# Patient Record
Sex: FEMALE | Race: ASIAN | NOT HISPANIC OR LATINO | Employment: FULL TIME | URBAN - METROPOLITAN AREA
[De-identification: names, ages, dates, MRNs, and addresses within clinical notes are randomized per-mention and may not be internally consistent; named-entity substitution may affect disease eponyms.]

---

## 2017-05-02 ENCOUNTER — AMBULATORY - HEALTHEAST (OUTPATIENT)
Dept: MULTI SPECIALTY CLINIC | Facility: CLINIC | Age: 32
End: 2017-05-02

## 2017-05-02 ENCOUNTER — OFFICE VISIT - HEALTHEAST (OUTPATIENT)
Dept: FAMILY MEDICINE | Facility: CLINIC | Age: 32
End: 2017-05-02

## 2017-05-02 DIAGNOSIS — N76.4 ABSCESS OF LABIA: ICD-10-CM

## 2017-05-02 LAB
HPV_EXT - HISTORICAL: NORMAL
PAP SMEAR - HIM PATIENT REPORTED: ABNORMAL

## 2017-10-05 ENCOUNTER — RECORDS - HEALTHEAST (OUTPATIENT)
Dept: ADMINISTRATIVE | Facility: OTHER | Age: 32
End: 2017-10-05

## 2018-04-13 ENCOUNTER — COMMUNICATION - HEALTHEAST (OUTPATIENT)
Dept: FAMILY MEDICINE | Facility: CLINIC | Age: 33
End: 2018-04-13

## 2018-12-10 ENCOUNTER — COMMUNICATION - HEALTHEAST (OUTPATIENT)
Dept: FAMILY MEDICINE | Facility: CLINIC | Age: 33
End: 2018-12-10

## 2019-04-22 ENCOUNTER — COMMUNICATION - HEALTHEAST (OUTPATIENT)
Dept: FAMILY MEDICINE | Facility: CLINIC | Age: 34
End: 2019-04-22

## 2020-02-07 ENCOUNTER — OFFICE VISIT - HEALTHEAST (OUTPATIENT)
Dept: FAMILY MEDICINE | Facility: CLINIC | Age: 35
End: 2020-02-07

## 2020-02-07 ENCOUNTER — COMMUNICATION - HEALTHEAST (OUTPATIENT)
Dept: FAMILY MEDICINE | Facility: CLINIC | Age: 35
End: 2020-02-07

## 2020-02-07 DIAGNOSIS — I10 ESSENTIAL HYPERTENSION: ICD-10-CM

## 2020-02-07 DIAGNOSIS — E66.811 OBESITY (BMI 30.0-34.9): ICD-10-CM

## 2020-02-07 DIAGNOSIS — E11.9 DIABETES (H): ICD-10-CM

## 2020-02-07 DIAGNOSIS — R80.9 MICROALBUMINURIA: ICD-10-CM

## 2020-02-07 DIAGNOSIS — F17.200 SMOKING: ICD-10-CM

## 2020-02-07 DIAGNOSIS — E11.65 POORLY CONTROLLED DIABETES MELLITUS (H): ICD-10-CM

## 2020-02-07 LAB
ALBUMIN SERPL-MCNC: 2.8 G/DL (ref 3.5–5)
ALP SERPL-CCNC: 110 U/L (ref 45–120)
ALT SERPL W P-5'-P-CCNC: 26 U/L (ref 0–45)
ANION GAP SERPL CALCULATED.3IONS-SCNC: 11 MMOL/L (ref 5–18)
AST SERPL W P-5'-P-CCNC: 11 U/L (ref 0–40)
BILIRUB SERPL-MCNC: 0.2 MG/DL (ref 0–1)
BUN SERPL-MCNC: 25 MG/DL (ref 8–22)
CALCIUM SERPL-MCNC: 8.8 MG/DL (ref 8.5–10.5)
CHLORIDE BLD-SCNC: 102 MMOL/L (ref 98–107)
CO2 SERPL-SCNC: 21 MMOL/L (ref 22–31)
CREAT SERPL-MCNC: 2.33 MG/DL (ref 0.6–1.1)
CREAT UR-MCNC: 64.4 MG/DL
GFR SERPL CREATININE-BSD FRML MDRD: 24 ML/MIN/1.73M2
GLUCOSE BLD-MCNC: 294 MG/DL (ref 70–125)
HBA1C MFR BLD: 11 % (ref 3.5–6)
MICROALBUMIN UR-MCNC: 483.09 MG/DL (ref 0–1.99)
MICROALBUMIN/CREAT UR: 7501.4 MG/G
POTASSIUM BLD-SCNC: 4.6 MMOL/L (ref 3.5–5)
PROT SERPL-MCNC: 7.1 G/DL (ref 6–8)
SODIUM SERPL-SCNC: 134 MMOL/L (ref 136–145)

## 2020-02-07 ASSESSMENT — MIFFLIN-ST. JEOR: SCORE: 1531.95

## 2020-02-10 ENCOUNTER — COMMUNICATION - HEALTHEAST (OUTPATIENT)
Dept: FAMILY MEDICINE | Facility: CLINIC | Age: 35
End: 2020-02-10

## 2020-02-10 DIAGNOSIS — E11.65 POORLY CONTROLLED DIABETES MELLITUS (H): ICD-10-CM

## 2020-02-13 ENCOUNTER — RECORDS - HEALTHEAST (OUTPATIENT)
Dept: ADMINISTRATIVE | Facility: OTHER | Age: 35
End: 2020-02-13

## 2020-02-13 LAB — RETINOPATHY: POSITIVE

## 2020-02-17 ENCOUNTER — RECORDS - HEALTHEAST (OUTPATIENT)
Dept: HEALTH INFORMATION MANAGEMENT | Facility: CLINIC | Age: 35
End: 2020-02-17

## 2020-02-28 ENCOUNTER — OFFICE VISIT (OUTPATIENT)
Dept: ENDOCRINOLOGY | Facility: CLINIC | Age: 35
End: 2020-02-28
Payer: COMMERCIAL

## 2020-02-28 ENCOUNTER — RECORDS - HEALTHEAST (OUTPATIENT)
Dept: ADMINISTRATIVE | Facility: OTHER | Age: 35
End: 2020-02-28

## 2020-02-28 VITALS
OXYGEN SATURATION: 100 % | DIASTOLIC BLOOD PRESSURE: 98 MMHG | BODY MASS INDEX: 36.73 KG/M2 | WEIGHT: 200.8 LBS | HEART RATE: 94 BPM | SYSTOLIC BLOOD PRESSURE: 134 MMHG

## 2020-02-28 DIAGNOSIS — R80.9 PROTEINURIA, UNSPECIFIED TYPE: ICD-10-CM

## 2020-02-28 DIAGNOSIS — E66.9 OBESITY WITH SERIOUS COMORBIDITY, UNSPECIFIED CLASSIFICATION, UNSPECIFIED OBESITY TYPE: ICD-10-CM

## 2020-02-28 DIAGNOSIS — E66.01 MORBID OBESITY (H): ICD-10-CM

## 2020-02-28 DIAGNOSIS — E11.319 TYPE 2 DIABETES MELLITUS WITH RETINOPATHY WITHOUT MACULAR EDEMA, WITHOUT LONG-TERM CURRENT USE OF INSULIN, UNSPECIFIED LATERALITY, UNSPECIFIED RETINOPATHY SEVERITY (H): Primary | ICD-10-CM

## 2020-02-28 DIAGNOSIS — N18.4 CKD (CHRONIC KIDNEY DISEASE) STAGE 4, GFR 15-29 ML/MIN (H): ICD-10-CM

## 2020-02-28 PROCEDURE — 99205 OFFICE O/P NEW HI 60 MIN: CPT | Performed by: INTERNAL MEDICINE

## 2020-02-28 NOTE — PROGRESS NOTES
"CC: DM.     HPI: Patient presents for management of type 2 DM.   Original diagnosis in 2012.     Did not see a doctor for the last 2 years and then presented to establish care earlier this month.   She did not have a meter or test strips until recently.   She was on metformin but this was stopped 2/2 low GFR.   She was started on glipizide 5 mg BID.     No meter in clinic.   Checks 1 hour post lunch and 1 hour post dinner.   AM 98, post lunch 168, post dinner 199. These are estimates.  She was not checking prior to 2 weeks ago.     No regular exercise or diet plan.   Recently has been trying to reduce carb intake.   Notes she needs to reschedule appointment with dietician.     She did she an eye doctor and was told she needed to f/u in May.   Does not recall exact findings. No laser to date.     Occasional numbness in toes. Does not disrupt sleep.     ROS: 10 point ROS neg other than the symptoms noted above in the HPI.    PMH:   Type 2 DM  CKD  Proteinuria  Retinopathy      Meds:  Current Outpatient Medications   Medication     aspirin 81 MG EC tablet     aspirin 81 MG tablet     Blood Pressure Monitor KIT     Glucose Blood (MARIA DEL CARMEN CONTOUR TEST) strip     metFORMIN (GLUCOPHAGE) 1000 MG tablet     metFORMIN (GLUCOPHAGE) 500 MG tablet     No current facility-administered medications for this visit.    Glipizide 5 mg BID     FHX:   Mother has DM.   No known renal disease.     SHX:  Smokes 2-3 cigarettes a day.     Exam:   Vital signs:      BP: (!) 142/98 Pulse: 94     SpO2: 100 %       Weight: 91.1 kg (200 lb 12.8 oz)  Estimated body mass index is 36.73 kg/m  as calculated from the following:    Height as of 11/8/13: 1.575 m (5' 2\").    Weight as of this encounter: 91.1 kg (200 lb 12.8 oz).  Gen: In NAD.   HEENT: no proptosis or lid lag, EOMI, no palpable thyroid tissue.  Card: S1 S2 RRR no m/r/g. no LE edema.   Pulm: CTA b/l.   GI: NT ND +BS.   MSK: no gross deformities.   Derm: no rashes or lesions. +acanthosis " nigricans.   Neuro: no tremor, +2 DTR's. Unable to sense monofilament until the level of the metatarsals b/l.     A/P:   Type 2 DM - Outside records reviewed. Uncontrolled. Now with proteinuria and low GFR. Based on self reported numbers, she would not need basal insulin yet. Discussed reports about GLP-1 and DDP-4 inhibitor with pancreatitis and pancreatic cancer. Although I cannot definitively say the patient will not get pancreatitis or pancreatic cancer, to date there is no conclusive evidence of a causal link with these agents for pancreatic cancer. There is a slight increase in risk of pancreatitis but overall risk still low. Persons with DM tend to have more pancreatitis and pancreatic cancer de tiffani. They are safe to use in CKD as long as the patient does not develop emesis and become dehydrated. Reviewed this with the patient.   -Follow up with primary care next week as planned.   -Bring meter to all clinic visits.   -Start ozempic 0.25 mg once a week, and then increase to 0.50 mg a week after 4 weeks.   -ASA taking.  -BP: elevated. Will defer to Renal. Likely will start ACEi or ARB.   -NAFL/DENNIS:normal ALT and AST in 2/2020.  -Lipids: due to be checked. Statin not indicated yet.   -Eyes: note from 2/13/2020 mentions retinopathy. F/U per Ophthalmology in 5/2020.   -Smoking: not ready to quit today.     Obesity - BMR 1660. Treatment options limited with CKD. Discussed GLP-1 agonists as above.   -Increase exercise as tolerated. Goal of 30 minutes of brisk walking 5 times a week.   -Reschedule with dietician.   -Ozempic as above.     Proteinuria - Serum Cr 2.33, GFR 24. Explained the gravity of the situation to her.   -Referral to Nephrology.     Alexandro Dawkins MD on 2/28/2020 at 10:26 AM      CC:  Toby Howard MD   [phone] (Work)  [phone] (Fax)  [address] [place] [address]

## 2020-02-28 NOTE — PATIENT INSTRUCTIONS
-Referral to Nephrology.   -Reschedule with dietician.     -Increase exercise as tolerated. Goal of 30 minutes of brisk walking 5 times a week.     -Bring meter to all clinic visits.     -Start ozempic 0.25 mg once a week, and then increase to 0.50 mg a week after 4 weeks.     -See me in 8 weeks with labs prior.

## 2020-02-28 NOTE — LETTER
"    2/28/2020         RE: Alysia Hernandez  [address]  [place] [address]        Dear Colleague,    Thank you for referring your patient, Alysia Hernandez, to the [facility]. Please see a copy of my visit note below.    CC: DM.     HPI: Patient presents for management of type 2 DM.   Original diagnosis in 2012.     Did not see a doctor for the last 2 years and then presented to establish care earlier this month.   She did not have a meter or test strips until recently.   She was on metformin but this was stopped 2/2 low GFR.   She was started on glipizide 5 mg BID.     No meter in clinic.   Checks 1 hour post lunch and 1 hour post dinner.   AM 98, post lunch 168, post dinner 199. These are estimates.  She was not checking prior to 2 weeks ago.     No regular exercise or diet plan.   Recently has been trying to reduce carb intake.   Notes she needs to reschedule appointment with dietician.     She did she an eye doctor and was told she needed to f/u in May.   Does not recall exact findings. No laser to date.     Occasional numbness in toes. Does not disrupt sleep.     ROS: 10 point ROS neg other than the symptoms noted above in the HPI.    PMH:   Type 2 DM  CKD  Proteinuria  Retinopathy      Meds:  Current Outpatient Medications   Medication     aspirin 81 MG EC tablet     aspirin 81 MG tablet     Blood Pressure Monitor KIT     Glucose Blood (MARIA DEL CARMEN CONTOUR TEST) strip     metFORMIN (GLUCOPHAGE) 1000 MG tablet     metFORMIN (GLUCOPHAGE) 500 MG tablet     No current facility-administered medications for this visit.    Glipizide 5 mg BID     FHX:   Mother has DM.   No known renal disease.     SHX:  Smokes 2-3 cigarettes a day.     Exam:   Vital signs:      BP: (!) 142/98 Pulse: 94     SpO2: 100 %       Weight: 91.1 kg (200 lb 12.8 oz)  Estimated body mass index is 36.73 kg/m  as calculated from the following:    Height as of 11/8/13: 1.575 m (5' 2\").    Weight as of this encounter: 91.1 kg (200 lb 12.8 " oz).  Gen: In NAD.   HEENT: no proptosis or lid lag, EOMI, no palpable thyroid tissue.  Card: S1 S2 RRR no m/r/g. no LE edema.   Pulm: CTA b/l.   GI: NT ND +BS.   MSK: no gross deformities.   Derm: no rashes or lesions. +acanthosis nigricans.   Neuro: no tremor, +2 DTR's. Unable to sense monofilament until the level of the metatarsals b/l.     A/P:   Type 2 DM - Outside records reviewed. Uncontrolled. Now with proteinuria and low GFR. Based on self reported numbers, she would not need basal insulin yet. Discussed reports about GLP-1 and DDP-4 inhibitor with pancreatitis and pancreatic cancer. Although I cannot definitively say the patient will not get pancreatitis or pancreatic cancer, to date there is no conclusive evidence of a causal link with these agents for pancreatic cancer. There is a slight increase in risk of pancreatitis but overall risk still low. Persons with DM tend to have more pancreatitis and pancreatic cancer de tiffani. They are safe to use in CKD as long as the patient does not develop emesis and become dehydrated. Reviewed this with the patient.   -Follow up with primary care next week as planned.   -Bring meter to all clinic visits.   -Start ozempic 0.25 mg once a week, and then increase to 0.50 mg a week after 4 weeks.   -ASA taking.  -BP: elevated. Will defer to Renal. Likely will start ACEi or ARB.   -NAFL/DENNIS:normal ALT and AST in 2/2020.  -Lipids: due to be checked. Statin not indicated yet.   -Eyes: note from 2/13/2020 mentions retinopathy. F/U per Ophthalmology in 5/2020.   -Smoking: not ready to quit today.     Obesity - BMR 1660. Treatment options limited with CKD. Discussed GLP-1 agonists as above.   -Increase exercise as tolerated. Goal of 30 minutes of brisk walking 5 times a week.   -Reschedule with dietician.   -Ozempic as above.     Proteinuria - Serum Cr 2.33, GFR 24. Explained the gravity of the situation to her.   -Referral to Nephrology.     Alexandro Dawkins MD on  2/28/2020 at 10:26 AM      CC:  Toby Howard MD   [phone] (Work)  [phone] (Fax)  [address] [place] [address]    Again, thank you for allowing me to participate in the care of your patient.        Sincerely,        Alexandro Dawkins MD

## 2020-02-28 NOTE — Clinical Note
Please CC:Toby Howard [phone] (Work)[phone] (Fax)[phone] o [address] [place] [id number]

## 2020-03-19 ENCOUNTER — COMMUNICATION - HEALTHEAST (OUTPATIENT)
Dept: FAMILY MEDICINE | Facility: CLINIC | Age: 35
End: 2020-03-19

## 2020-03-19 DIAGNOSIS — E11.9 DIABETES (H): ICD-10-CM

## 2020-03-27 ENCOUNTER — OFFICE VISIT - HEALTHEAST (OUTPATIENT)
Dept: FAMILY MEDICINE | Facility: CLINIC | Age: 35
End: 2020-03-27

## 2020-03-27 DIAGNOSIS — F17.200 SMOKING: ICD-10-CM

## 2020-03-27 DIAGNOSIS — E11.22 TYPE 2 DIABETES MELLITUS WITH CHRONIC KIDNEY DISEASE, WITHOUT LONG-TERM CURRENT USE OF INSULIN, UNSPECIFIED CKD STAGE (H): ICD-10-CM

## 2020-03-27 DIAGNOSIS — E11.9 DIABETES (H): ICD-10-CM

## 2020-03-27 DIAGNOSIS — I10 ESSENTIAL HYPERTENSION: ICD-10-CM

## 2020-04-06 ENCOUNTER — COMMUNICATION - HEALTHEAST (OUTPATIENT)
Dept: FAMILY MEDICINE | Facility: CLINIC | Age: 35
End: 2020-04-06

## 2020-04-06 DIAGNOSIS — I10 ESSENTIAL HYPERTENSION: ICD-10-CM

## 2020-04-10 ENCOUNTER — COMMUNICATION - HEALTHEAST (OUTPATIENT)
Dept: FAMILY MEDICINE | Facility: CLINIC | Age: 35
End: 2020-04-10

## 2020-04-24 ENCOUNTER — AMBULATORY - HEALTHEAST (OUTPATIENT)
Dept: EDUCATION SERVICES | Facility: CLINIC | Age: 35
End: 2020-04-24

## 2020-04-24 DIAGNOSIS — E11.9 TYPE 2 DIABETES MELLITUS WITHOUT COMPLICATION, WITHOUT LONG-TERM CURRENT USE OF INSULIN (H): ICD-10-CM

## 2020-05-07 ENCOUNTER — RECORDS - HEALTHEAST (OUTPATIENT)
Dept: ADMINISTRATIVE | Facility: OTHER | Age: 35
End: 2020-05-07

## 2020-05-11 ENCOUNTER — RECORDS - HEALTHEAST (OUTPATIENT)
Dept: ADMINISTRATIVE | Facility: OTHER | Age: 35
End: 2020-05-11

## 2020-05-11 LAB — RETINOPATHY: POSITIVE

## 2020-05-13 ENCOUNTER — COMMUNICATION - HEALTHEAST (OUTPATIENT)
Dept: FAMILY MEDICINE | Facility: CLINIC | Age: 35
End: 2020-05-13

## 2020-05-13 ENCOUNTER — RECORDS - HEALTHEAST (OUTPATIENT)
Dept: HEALTH INFORMATION MANAGEMENT | Facility: CLINIC | Age: 35
End: 2020-05-13

## 2020-05-13 DIAGNOSIS — E11.9 TYPE 2 DIABETES MELLITUS WITHOUT COMPLICATION, WITHOUT LONG-TERM CURRENT USE OF INSULIN (H): ICD-10-CM

## 2020-05-26 ENCOUNTER — COMMUNICATION - HEALTHEAST (OUTPATIENT)
Dept: FAMILY MEDICINE | Facility: CLINIC | Age: 35
End: 2020-05-26

## 2020-05-26 DIAGNOSIS — E11.9 DIABETES (H): ICD-10-CM

## 2020-05-29 ENCOUNTER — HOSPITAL ENCOUNTER (OUTPATIENT)
Dept: ULTRASOUND IMAGING | Facility: HOSPITAL | Age: 35
Discharge: HOME | End: 2020-05-29

## 2020-05-29 DIAGNOSIS — N18.4 CHRONIC KIDNEY DISEASE, STAGE 4 (SEVERE) (H): ICD-10-CM

## 2020-05-29 DIAGNOSIS — R80.9 NEPHROTIC RANGE PROTEINURIA: ICD-10-CM

## 2020-05-29 DIAGNOSIS — Z72.0 CURRENT TOBACCO USE: ICD-10-CM

## 2020-06-05 ENCOUNTER — RECORDS - HEALTHEAST (OUTPATIENT)
Dept: ADMINISTRATIVE | Facility: OTHER | Age: 35
End: 2020-06-05

## 2020-06-05 ENCOUNTER — COMMUNICATION - HEALTHEAST (OUTPATIENT)
Dept: FAMILY MEDICINE | Facility: CLINIC | Age: 35
End: 2020-06-05

## 2020-06-05 DIAGNOSIS — I10 ESSENTIAL HYPERTENSION: ICD-10-CM

## 2020-06-07 ENCOUNTER — COMMUNICATION - HEALTHEAST (OUTPATIENT)
Dept: FAMILY MEDICINE | Facility: CLINIC | Age: 35
End: 2020-06-07

## 2020-06-07 DIAGNOSIS — I10 ESSENTIAL HYPERTENSION: ICD-10-CM

## 2020-06-10 ENCOUNTER — COMMUNICATION - HEALTHEAST (OUTPATIENT)
Dept: FAMILY MEDICINE | Facility: CLINIC | Age: 35
End: 2020-06-10

## 2020-06-11 ENCOUNTER — RECORDS - HEALTHEAST (OUTPATIENT)
Dept: ADMINISTRATIVE | Facility: OTHER | Age: 35
End: 2020-06-11

## 2020-06-11 ENCOUNTER — OFFICE VISIT - HEALTHEAST (OUTPATIENT)
Dept: FAMILY MEDICINE | Facility: CLINIC | Age: 35
End: 2020-06-11

## 2020-06-11 DIAGNOSIS — I10 ELEVATED BLOOD PRESSURE READING WITH DIAGNOSIS OF HYPERTENSION: ICD-10-CM

## 2020-06-11 DIAGNOSIS — E11.22 TYPE 2 DIABETES MELLITUS WITH CHRONIC KIDNEY DISEASE, WITHOUT LONG-TERM CURRENT USE OF INSULIN, UNSPECIFIED CKD STAGE (H): ICD-10-CM

## 2020-06-11 DIAGNOSIS — H35.033 HYPERTENSIVE RETINOPATHY OF BOTH EYES: ICD-10-CM

## 2020-06-11 DIAGNOSIS — R80.9 NEPHROTIC RANGE PROTEINURIA: ICD-10-CM

## 2020-06-11 DIAGNOSIS — I10 ESSENTIAL HYPERTENSION: ICD-10-CM

## 2020-06-16 ENCOUNTER — COMMUNICATION - HEALTHEAST (OUTPATIENT)
Dept: FAMILY MEDICINE | Facility: CLINIC | Age: 35
End: 2020-06-16

## 2020-06-16 DIAGNOSIS — E11.9 DIABETES (H): ICD-10-CM

## 2020-07-01 ENCOUNTER — COMMUNICATION - HEALTHEAST (OUTPATIENT)
Dept: SCHEDULING | Facility: CLINIC | Age: 35
End: 2020-07-01

## 2020-07-01 DIAGNOSIS — E11.22 TYPE 2 DIABETES MELLITUS WITH CHRONIC KIDNEY DISEASE, WITHOUT LONG-TERM CURRENT USE OF INSULIN, UNSPECIFIED CKD STAGE (H): ICD-10-CM

## 2020-07-09 ENCOUNTER — COMMUNICATION - HEALTHEAST (OUTPATIENT)
Dept: FAMILY MEDICINE | Facility: CLINIC | Age: 35
End: 2020-07-09

## 2020-07-09 DIAGNOSIS — E11.9 DIABETES (H): ICD-10-CM

## 2020-07-22 ENCOUNTER — RECORDS - HEALTHEAST (OUTPATIENT)
Dept: ADMINISTRATIVE | Facility: OTHER | Age: 35
End: 2020-07-22

## 2020-07-22 LAB — RETINOPATHY: POSITIVE

## 2020-07-29 ENCOUNTER — RECORDS - HEALTHEAST (OUTPATIENT)
Dept: HEALTH INFORMATION MANAGEMENT | Facility: CLINIC | Age: 35
End: 2020-07-29

## 2020-12-15 ENCOUNTER — RECORDS - HEALTHEAST (OUTPATIENT)
Dept: ADMINISTRATIVE | Facility: OTHER | Age: 35
End: 2020-12-15

## 2020-12-15 LAB — RETINOPATHY: POSITIVE

## 2020-12-17 ENCOUNTER — COMMUNICATION - HEALTHEAST (OUTPATIENT)
Dept: FAMILY MEDICINE | Facility: CLINIC | Age: 35
End: 2020-12-17

## 2020-12-18 ENCOUNTER — RECORDS - HEALTHEAST (OUTPATIENT)
Dept: HEALTH INFORMATION MANAGEMENT | Facility: CLINIC | Age: 35
End: 2020-12-18

## 2020-12-21 ENCOUNTER — COMMUNICATION - HEALTHEAST (OUTPATIENT)
Dept: GENERAL RADIOLOGY | Facility: CLINIC | Age: 35
End: 2020-12-21

## 2021-01-08 ENCOUNTER — OFFICE VISIT - HEALTHEAST (OUTPATIENT)
Dept: FAMILY MEDICINE | Facility: CLINIC | Age: 36
End: 2021-01-08

## 2021-01-08 DIAGNOSIS — E66.811 OBESITY (BMI 30.0-34.9): ICD-10-CM

## 2021-01-08 DIAGNOSIS — N19 RENAL FAILURE, UNSPECIFIED CHRONICITY: ICD-10-CM

## 2021-01-08 DIAGNOSIS — F17.200 SMOKING: ICD-10-CM

## 2021-01-08 DIAGNOSIS — I10 ESSENTIAL HYPERTENSION: ICD-10-CM

## 2021-01-08 DIAGNOSIS — R80.9 NEPHROTIC RANGE PROTEINURIA: ICD-10-CM

## 2021-01-08 DIAGNOSIS — E11.22 TYPE 2 DIABETES MELLITUS WITH CHRONIC KIDNEY DISEASE, WITHOUT LONG-TERM CURRENT USE OF INSULIN, UNSPECIFIED CKD STAGE (H): ICD-10-CM

## 2021-01-08 DIAGNOSIS — R80.9 MICROALBUMINURIA: ICD-10-CM

## 2021-01-08 DIAGNOSIS — H35.033 HYPERTENSIVE RETINOPATHY OF BOTH EYES: ICD-10-CM

## 2021-01-08 ASSESSMENT — ANXIETY QUESTIONNAIRES
7. FEELING AFRAID AS IF SOMETHING AWFUL MIGHT HAPPEN: NOT AT ALL
1. FEELING NERVOUS, ANXIOUS, OR ON EDGE: NOT AT ALL
5. BEING SO RESTLESS THAT IT IS HARD TO SIT STILL: NOT AT ALL
6. BECOMING EASILY ANNOYED OR IRRITABLE: SEVERAL DAYS
3. WORRYING TOO MUCH ABOUT DIFFERENT THINGS: NOT AT ALL
4. TROUBLE RELAXING: NOT AT ALL
IF YOU CHECKED OFF ANY PROBLEMS ON THIS QUESTIONNAIRE, HOW DIFFICULT HAVE THESE PROBLEMS MADE IT FOR YOU TO DO YOUR WORK, TAKE CARE OF THINGS AT HOME, OR GET ALONG WITH OTHER PEOPLE: NOT DIFFICULT AT ALL
GAD7 TOTAL SCORE: 1
2. NOT BEING ABLE TO STOP OR CONTROL WORRYING: NOT AT ALL

## 2021-01-08 ASSESSMENT — PATIENT HEALTH QUESTIONNAIRE - PHQ9: SUM OF ALL RESPONSES TO PHQ QUESTIONS 1-9: 0

## 2021-01-13 ENCOUNTER — TRANSCRIBE ORDERS (OUTPATIENT)
Dept: OTHER | Age: 36
End: 2021-01-13

## 2021-01-13 DIAGNOSIS — N19 RENAL FAILURE: Primary | ICD-10-CM

## 2021-01-13 DIAGNOSIS — R80.9 NEPHROTIC RANGE PROTEINURIA: ICD-10-CM

## 2021-01-15 ENCOUNTER — COMMUNICATION - HEALTHEAST (OUTPATIENT)
Dept: FAMILY MEDICINE | Facility: CLINIC | Age: 36
End: 2021-01-15

## 2021-01-15 DIAGNOSIS — R80.9 NEPHROTIC RANGE PROTEINURIA: ICD-10-CM

## 2021-01-15 DIAGNOSIS — I10 ESSENTIAL HYPERTENSION: ICD-10-CM

## 2021-01-29 ENCOUNTER — AMBULATORY - HEALTHEAST (OUTPATIENT)
Dept: LAB | Facility: CLINIC | Age: 36
End: 2021-01-29

## 2021-01-29 ENCOUNTER — AMBULATORY - HEALTHEAST (OUTPATIENT)
Dept: NURSING | Facility: CLINIC | Age: 36
End: 2021-01-29

## 2021-01-29 DIAGNOSIS — I10 ESSENTIAL HYPERTENSION: ICD-10-CM

## 2021-01-29 DIAGNOSIS — R80.9 NEPHROTIC RANGE PROTEINURIA: ICD-10-CM

## 2021-01-29 DIAGNOSIS — H35.033 HYPERTENSIVE RETINOPATHY OF BOTH EYES: ICD-10-CM

## 2021-01-29 DIAGNOSIS — E11.22 TYPE 2 DIABETES MELLITUS WITH CHRONIC KIDNEY DISEASE, WITHOUT LONG-TERM CURRENT USE OF INSULIN, UNSPECIFIED CKD STAGE (H): ICD-10-CM

## 2021-01-29 LAB
ALBUMIN SERPL-MCNC: 3.2 G/DL (ref 3.5–5)
ALP SERPL-CCNC: 69 U/L (ref 45–120)
ALT SERPL W P-5'-P-CCNC: 15 U/L (ref 0–45)
ANION GAP SERPL CALCULATED.3IONS-SCNC: 9 MMOL/L (ref 5–18)
AST SERPL W P-5'-P-CCNC: 14 U/L (ref 0–40)
BILIRUB SERPL-MCNC: 0.1 MG/DL (ref 0–1)
BUN SERPL-MCNC: 44 MG/DL (ref 8–22)
CALCIUM SERPL-MCNC: 8.3 MG/DL (ref 8.5–10.5)
CHLORIDE BLD-SCNC: 110 MMOL/L (ref 98–107)
CHOLEST SERPL-MCNC: 223 MG/DL
CO2 SERPL-SCNC: 18 MMOL/L (ref 22–31)
CREAT SERPL-MCNC: 3.19 MG/DL (ref 0.6–1.1)
CREAT UR-MCNC: 56 MG/DL
FASTING STATUS PATIENT QL REPORTED: NO
GFR SERPL CREATININE-BSD FRML MDRD: 17 ML/MIN/1.73M2
GLUCOSE BLD-MCNC: 113 MG/DL (ref 70–125)
HBA1C MFR BLD: 6.3 %
HDLC SERPL-MCNC: 31 MG/DL
LDLC SERPL CALC-MCNC: 127 MG/DL
MICROALBUMIN UR-MCNC: 276 MG/DL (ref 0–1.99)
MICROALBUMIN/CREAT UR: 4928.6 MG/G
POTASSIUM BLD-SCNC: 5 MMOL/L (ref 3.5–5)
PROT SERPL-MCNC: 7.1 G/DL (ref 6–8)
SODIUM SERPL-SCNC: 137 MMOL/L (ref 136–145)
TRIGL SERPL-MCNC: 325 MG/DL

## 2021-02-04 ENCOUNTER — COMMUNICATION - HEALTHEAST (OUTPATIENT)
Dept: FAMILY MEDICINE | Facility: CLINIC | Age: 36
End: 2021-02-04

## 2021-03-22 ENCOUNTER — RECORDS - HEALTHEAST (OUTPATIENT)
Dept: ADMINISTRATIVE | Facility: OTHER | Age: 36
End: 2021-03-22

## 2021-03-22 LAB — RETINOPATHY: POSITIVE

## 2021-03-26 ENCOUNTER — RECORDS - HEALTHEAST (OUTPATIENT)
Dept: HEALTH INFORMATION MANAGEMENT | Facility: CLINIC | Age: 36
End: 2021-03-26

## 2021-03-26 ENCOUNTER — OFFICE VISIT - HEALTHEAST (OUTPATIENT)
Dept: FAMILY MEDICINE | Facility: CLINIC | Age: 36
End: 2021-03-26

## 2021-03-26 DIAGNOSIS — E11.9 DIABETES (H): ICD-10-CM

## 2021-03-26 DIAGNOSIS — I10 ESSENTIAL HYPERTENSION: ICD-10-CM

## 2021-03-26 DIAGNOSIS — F17.200 SMOKING: ICD-10-CM

## 2021-03-26 DIAGNOSIS — N18.4 CKD (CHRONIC KIDNEY DISEASE) STAGE 4, GFR 15-29 ML/MIN (H): ICD-10-CM

## 2021-03-26 DIAGNOSIS — E66.811 OBESITY (BMI 30.0-34.9): ICD-10-CM

## 2021-03-26 ASSESSMENT — MIFFLIN-ST. JEOR: SCORE: 1519.25

## 2021-05-27 VITALS — SYSTOLIC BLOOD PRESSURE: 163 MMHG | DIASTOLIC BLOOD PRESSURE: 85 MMHG | HEART RATE: 96 BPM

## 2021-05-27 ASSESSMENT — PATIENT HEALTH QUESTIONNAIRE - PHQ9: SUM OF ALL RESPONSES TO PHQ QUESTIONS 1-9: 0

## 2021-05-28 ASSESSMENT — ANXIETY QUESTIONNAIRES: GAD7 TOTAL SCORE: 1

## 2021-05-30 VITALS — WEIGHT: 192.9 LBS | BODY MASS INDEX: 34.72 KG/M2

## 2021-06-04 VITALS
WEIGHT: 193 LBS | RESPIRATION RATE: 20 BRPM | BODY MASS INDEX: 34.2 KG/M2 | OXYGEN SATURATION: 99 % | SYSTOLIC BLOOD PRESSURE: 181 MMHG | DIASTOLIC BLOOD PRESSURE: 116 MMHG | HEART RATE: 101 BPM | HEIGHT: 63 IN

## 2021-06-04 VITALS
WEIGHT: 186.25 LBS | HEART RATE: 95 BPM | DIASTOLIC BLOOD PRESSURE: 109 MMHG | SYSTOLIC BLOOD PRESSURE: 195 MMHG | BODY MASS INDEX: 33.5 KG/M2 | RESPIRATION RATE: 16 BRPM

## 2021-06-04 VITALS — WEIGHT: 193 LBS | BODY MASS INDEX: 34.72 KG/M2

## 2021-06-05 VITALS
BODY MASS INDEX: 33.7 KG/M2 | WEIGHT: 190.2 LBS | SYSTOLIC BLOOD PRESSURE: 143 MMHG | DIASTOLIC BLOOD PRESSURE: 84 MMHG | HEART RATE: 89 BPM | HEIGHT: 63 IN | RESPIRATION RATE: 16 BRPM

## 2021-06-05 NOTE — PROGRESS NOTES
Assessment:     1. Diabetes (H)  blood glucose test (CONTOUR TEST STRIPS) strips    generic lancets (FINGERSTIX LANCETS)    Microalbumin, Random Urine    Ambulatory referral to Ophthalmology    Glycosylated Hemoglobin A1c    Comprehensive Metabolic Panel    metFORMIN (GLUCOPHAGE) 500 MG tablet    glipiZIDE (GLUCOTROL XL) 5 MG 24 hr tablet    Glucose monitor    Ambulatory referral to Nephrology   2. Smoking     3. Essential hypertension  losartan (COZAAR) 50 MG tablet    Ambulatory referral to Nephrology   4. Obesity (BMI 30.0-34.9)  Ambulatory referral to Endocrinology   5. Creatinine elevation  Ambulatory referral to Nephrology    Ambulatory referral to Endocrinology   6. Microalbuminuria  Ambulatory referral to Nephrology    Ambulatory referral to Endocrinology   7. Poorly controlled diabetes mellitus (H)  Ambulatory referral to Diabetes Education Program (Existing Diagnosis)    Ambulatory referral to Endocrinology         Patient with poorly controlled diabetes last seen at [facility] and May/2017.  She has diabetes mellitus with smoking and alcohol use and very high blood pressure today presents for establishing cares again.      We will start with an urgent referral to nephrology and endocrinology for better management.  Will refer for diabetes education.  Also refer her to ophthalmology.    Detailed discussion regarding follow-up with patient done.  Discussed that she remains very high risk for complication of diabetes.  Reassured that with proper medication we can improve quality of life and complication in future.    Counseled to quit smoking.  About 3 to 8 minutes spent on counseling.    Greater than 40 minutes was spent today in interview and examination with more than 50% of that time in counseling and coordination of care.      During clinic visit, I had initiated her on metformin based on previous labs.  However noted at time of documentation that her creatinine is very high and likely some kidney  failure at this time and I have sent a message to stop metformin or not to initiate at this time.    Plan:      Discussed general issues about diabetes pathophysiology and management.  Counseling at today's visit: discussed the need for weight loss, discussed the advantages of a diet low in carbohydrates and reminded to check sugars regularly and to bring readings in at the time of the next visit.  Educational material distributed.  Reminded to get yearly retinal exam.  Diabetes educator referral.  Endocrinology clinic referral.     Subjective:      Chief Complaint   Patient presents with     Diabetes     Pt here today for DM check        Alysia Hernandez is a 34 y.o. female who presents for follow up of diabetes.. Current symptoms include: none.   Patient with diabetes was not followed in clinic for couple of years and is not on any medication.  She also has a diagnosis of hypertension and is not on any medication.    She informs me that she stopped taking medication as she did not like the way it made her feel.  Metformin caused her to have stomach bloating.    She denies any chest pain or shortness of breath.    She continues to smoke.    She also informs me that she drinks pretty heavily on the weekends.  She denies daily drinking habits.  On asking the amount, she informs me she can drink up to a whole bottle of hard liquor.    She got worried as her aunt recently had a stroke.  Her mom is also had a small stroke.  There is diabetes in the family.    She currently lives in her dad's house and continues to work.      The following portions of the patient's history were reviewed and updated as appropriate: allergies, current medications, past family history, past medical history, past social history, past surgical history and problem list.  No Known Allergies    Current Outpatient Medications on File Prior to Visit   Medication Sig Dispense Refill     NEEDLES, INSULIN DISPOSABLE (BD ULTRA-FINE KAREN PEN NEEDLES MISC)  Use 1 Units As Directed Daily at 8:00 am..       No current facility-administered medications on file prior to visit.        Patient Active Problem List   Diagnosis     Type 2 diabetes mellitus without complication (H)     Ganglion     HTN (hypertension)     Smoking     Obesity     Microalbuminuria     Renal failure, unspecified chronicity     Obesity (BMI 30.0-34.9)     Diabetes (H)       Past Medical History:   Diagnosis Date     Diabetes mellitus (H)        History reviewed. No pertinent surgical history.    Family History   Problem Relation Age of Onset     Diabetes Mother         50     Stroke Mother      No Medical Problems Father      No Medical Problems Sister      No Medical Problems Brother      Stroke Paternal Aunt        Social History     Socioeconomic History     Marital status: Single     Spouse name: None     Number of children: None     Years of education: None     Highest education level: None   Occupational History     None   Social Needs     Financial resource strain: None     Food insecurity:     Worry: None     Inability: None     Transportation needs:     Medical: None     Non-medical: None   Tobacco Use     Smoking status: Current Every Day Smoker     Packs/day: 0.25     Types: Cigarettes     Smokeless tobacco: Never Used   Substance and Sexual Activity     Alcohol use: No     Drug use: No     Sexual activity: None   Lifestyle     Physical activity:     Days per week: None     Minutes per session: None     Stress: None   Relationships     Social connections:     Talks on phone: None     Gets together: None     Attends Restorationism service: None     Active member of club or organization: None     Attends meetings of clubs or organizations: None     Relationship status: None     Intimate partner violence:     Fear of current or ex partner: None     Emotionally abused: None     Physically abused: None     Forced sexual activity: None   Other Topics Concern     None   Social History Narrative     "Lives alone at dad's home. 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  Partnered for 3 years. No children    Has has step kids ( 2) 18 and 8.        Toby Howard MD  2/7/2020           Review of Systems  A 12 point comprehensive review of systems was negative except as noted.      Objective:     BP (!) 181/116   Pulse (!) 101   Resp 20   Ht 5' 2.52\" (1.588 m)   Wt 193 lb (87.5 kg)   LMP 01/29/2020 (Approximate)   SpO2 99%   BMI 34.72 kg/m      GENERAL APPEARANCE:  Appearing stated age, smiling, alert, cooperative, and in no acute distress.   HEENT: Pupils equal, regular, react to light and accommodation. Extraocular muscles intact, fundi benign. Ear canals and tympanic membranes are normal. Lips, mouth, and throat are unremarkable.   NECK: Neck supple without adenopathy, thyromegaly or masses.   LYMPH: No anterior cervical or supraclavicular LN enlargement   PULMONARY: Normal respiratory effort. Chest is clear.   CARDIOVASCULAR: Heart auscultation: rhythm regular, heart sounds normal S1 and S2, bruit carotid artery absent.   SKIN: Warm and well perfused..   ABDOMEN: Abdomen soft, non-tender. BS normal. No masses or organomegaly.   EXTREMITIES: Peripheral pulses are full. Extremities with no edema.   MENTAL STATUS: Alert, oriented and thought content appropriate   NEUROLOGIC: Station and gait normal, strength and movement normal, reflexes are normal and symmetric       "

## 2021-06-05 NOTE — TELEPHONE ENCOUNTER
----- Message from Toby Howard MD sent at 2/9/2020 12:16 PM CST -----  Please call patient to make sure that she has reviewed  my chart message.  She should not start on metformin.    Toby Howard MD  2/9/2020

## 2021-06-06 NOTE — TELEPHONE ENCOUNTER
Drug Change Request  Who is calling?:  Pharmacy  What is the current medication?:  Contour plain is being phased out.  Need new order for Contour Next Meter only.   What alternative is being requested?: Countour Next Meter  Why the request to change?:  Pharmacy request  Requested Pharmacy?: [facility]  Okay to leave a detailed message?:  Yes [phone]

## 2021-06-07 NOTE — PROGRESS NOTES
Diabetes Educator has received verbal consent for a telephone visit for the patient./40 minutes    Assessment:   --initial telephone visit with Alysia regarding her Diabetes management.  --she had noticed blurry vision earlier this year, some increased thirst and urination  --patient stated that she was not taking her medication for Diabetes or making changes with her diet up until her visit with the MD in 3/20, when she had her A1C checked, after that visit she started taking her medication as prescribed, checking her glucose and changed her diet to the ketogenic diet.  --she is currently consuming three meals/snacks:  Am: schrader/eggs  Lunch: salad with HB eggs or chicken or schrader  Dinner: salad with protein  Snacks: berries or vegetables or nuts  --ETOH has been decreased to 1-2 glasses/wine every other day  --she has cut out sugary beverages, only drinking water or diet beverages  --she is smoking 3-5 cigarettes/day  --walking once per week  --eyes examined early in 2020, no recent dental exam    Blood glucose record 4/13-4/21:  Educator did not see meter, glucose readings are per patient  FBS: [id number]  Post meal: 97,79    Medications prescribed:  Glipizide 5 mg twice daily    Metformin 500 mg (2) twice daily(patient not taking per MD recommendation)  Ozempic .25 mg/week(patient not taking ( not taking per MD recommendation)    Education:  --reviewed BG readings, SMBG, goals for BG/A1C  --reviewed s/s of hypo/hyperglycemia & treatment,   --discussed the ketogenic diet: healthy eating for long term, hydration, benefit of hydration, preventative care: feet, teeth, eyes.     Plan:   1. Check glucose twice daily: fasting and two hours post meal.  2. If glucose below 70 mg/dl, treat with 4 oz juice or regular soda, recheck glucose in 15 minutes.  3. If patient has two readings of 70 or below in one week, call MD office for recommendations on medication.   4. Patient to exercise 2x/week for 30  minutes.   5. Follow up with educator on 20  6. Will schedule dental exam when clinics reopen.    Subjective and Objective:      Alysia Hernandez is referred by Toby Howard MD for Diabetes Education.     Lab Results   Component Value Date    HGBA1C 11.0 (H) 2020                         Education:     Monitoring   Meter (per above goals): educator did not see meter, glucose readings were given by patient  Monitoring: Assessed, Discussed and Literature provided  BG goals: Assessed, Discussed and Literature provided    Nutrition Management  Nutrition Management: Assessed, Discussed and Literature provided  Weight: Assessed and Discussed  Portions/Balance: Assessed, Discussed and Literature provided  Physical Activity: Assessed, Discussed and Literature provided    Orals: Assessed and Discussed      Diabetes Disease Process: Assessed, Discussed and Literature provided    Acute Complications: Prevent, Detect, Treat:  Hypoglycemia: Assessed, Discussed and Literature provided  Hyperglycemia: Assessed, Discussed and Literature provided  Sick Days: Literature provided  Driving: Not addressed    Chronic Complications  Foot Care:Assessed, Discussed and Literature provided  Skin Care: Assessed, Discussed and Literature provided  Eye: Assessed, Discussed and Literature provided  ABC: Assessed  Teeth:Assessed and Discussed  Goal Setting and Problem Solving: Assessed and Discussed  Barriers: Assessed and Discussed  Psychosocial Adjustments: Assessed and Discussed      Time spent with the patient: 40 minutes for diabetes education and counseling.   Previous Education: patient states she had education when diagnoses with Diabetes back in   Visit Type:DSMT  Hours Remainin, DSMT 2 and MNT 1.25      Prudence Harrell  2020

## 2021-06-07 NOTE — PROGRESS NOTES
"Alysia Hernandez is a 34 y.o. female who is being evaluated via a billable telephone visit.      The patient has been notified of following:     \"This telephone visit will be conducted via a call between you and your physician/provider. We have found that certain health care needs can be provided without the need for a physical exam.  This service lets us provide the care you need with a short phone conversation.  If a prescription is necessary we can send it directly to your pharmacy.  If lab work is needed we can place an order for that and you can then stop by our lab to have the test done at a later time.    If during the course of the call the physician/provider feels a telephone visit is not appropriate, you will not be charged for this service.\"         Alysia Hernandez complains of    Chief Complaint   Patient presents with     Diabetes     Pt has been monitoring BS at home and states lowest it was is 58, the highest was 146       I have reviewed and updated the patient's Past Medical History, Social History, Family History and Medication List.    ALLERGIES  Patient has no known allergies.    Additional provider notes: Follow-up with patient regarding her diabetes that is under poor control and blood pressure that was noted to be fairly high at last visit.  Since I have last seen her, patient has been evaluated by endocrinology.  She was started on Ozempic for her diabetes that she has not injected.  She has been checking her blood sugars regularly.  She informs me that her fasting blood sugars are 58, 98 and the highest was 107.  She informs me that postprandial highest numbers have been 146.  This is with glipizide 5 mg.  She does endorse increased urination.  She informs me that she gets up about 1 time at night to urinate.  She does not have a blood pressure monitor and has not been keeping a check of her blood pressure.  Blood pressure at endocrinologist visit was 142/98 on 2/28/2020.  She indicates that she " is feeling well and denies any symptoms referable to her elevated blood pressure. Specifically denies chest pain, palpitations, dyspnea, orthopnea,   PND or peripheral edema    She does continue smoking and feels that she has increased her smoking since stay at home orders for COVID outbreak has happened.    Assessment/Plan:  1. Type 2 diabetes mellitus with chronic kidney disease, without long-term current use of insulin, unspecified CKD stage (H)     2. Essential hypertension  blood pressure monitor Kit   3. Diabetes (H)  glipiZIDE (GLUCOTROL XL) 5 MG 24 hr tablet   4. Smoking       Her blood sugars appear to been under excellent control.  She can hold off on Ozempic for now noting these numbers  Strongly recommend that she quit smoking.  She does inform me that she has not had a drink since she has last seen me.  This is very encouraging.  I have sent a blood pressure monitor to the pharmacy and I would like her to check her blood pressure and reach to me via my chart so we can titrate up her losartan versus adding amlodipine to her current regimen.  Patient is agreeable.      Phone call duration:  14 minutes    Briseida Tinsley, CMA

## 2021-06-07 NOTE — PATIENT INSTRUCTIONS - HE
1. Check glucose twice daily: fasting and two hours after one of your meals.  2. Exercise twice weekly for 30 minutes  3. If you have two readings of 70 or below in the same week, please call into your physicians office.   4. Patient states she will continue to follow the ketogenic diet.

## 2021-06-08 NOTE — PROGRESS NOTES
Assessment:     1. Essential hypertension  amLODIPine (NORVASC) 10 MG tablet   2. Nephrotic range proteinuria  amLODIPine (NORVASC) 10 MG tablet   3. Type 2 diabetes mellitus with chronic kidney disease, without long-term current use of insulin, unspecified CKD stage (H)     4. Hypertensive retinopathy of both eyes     5. Elevated blood pressure reading with diagnosis of hypertension           Alysia Hernandez  has a past medical history  has a past medical history of Diabetes (H) (2/9/2020), Diabetes mellitus (H), HTN (hypertension) (9/14/2015), Hypertensive retinopathy of both eyes (4/19/2013), Nephrotic range proteinuria (6/11/2020), Obesity (BMI 30.0-34.9) (2/9/2020), Renal failure, unspecified chronicity (2/9/2020), and Smoking (5/4/2016).    She presents today with persistent extremely elevated blood pressure.  Chart reviewed along with nephrology specialist and ophthalmologist and notes and labs.    I paged the on-call nephrologist for associated nephrology and discussed her case.  I reviewed the nurse practitioner's note where it mentions that patient can be considered for metoprolol in future.  I do not believe metoprolol would be a good option for controlling extremely high blood pressure for this patient.  The on-call nephrologist agrees with the plan.  We discussed possible use of amlodipine, thiazide diuretics versus carvedilol.  There is definitely possibility of edema with amlodipine.  The specialist agrees that all of the above may be a good plan of care and we will start with amlodipine.      Greater than 40 minutes was spent today in interview and examination with more than 50% of that time in counseling and coordination of care of uncontrolled hypertension in a patient with diabetes, chronic disease and hypertensive retinopathy       Plan:      The diagnosis was discussed with the patient and evaluation and treatment plans outlined.  Follow up: Return in about 4 weeks (around 7/9/2020) for  recheck.     Patient Instructions   I have seen you today for follow-up of blood pressure.  Noting that you have had persistent high blood pressure, I have discussed your case with on-call doctor with associated nephrology.  At this time, I will start you on amlodipine 10 mg daily.    This medication can cause swelling of legs or feet.  If that is the case, you will let me know via MyChart or by calling my office and leaving a message.  We will then change the medication.      Per plan of care discussed with nephrologist, we still have room to try thiazide diuretics.  If, that does not work then we will try a beta-blocker called carvedilol which has better blood pressure control.    I will see her back in 1 month.    Keep checking of your  blood pressure at home.    Toby Howard MD  6/11/2020            Subjective:      Alysia Hernandez is a  female who presents for evaluation of   Chief Complaint   Patient presents with     Blood Pressure Check     Medication Management     Patient is here for follow-up of diabetes, blood pressure.  We started her back on diabetes medication in February.  Since then she has done amazingly well.  However, it was noted that she is in chronic renal failure and a referral to nephrology has been done.  She has been following with a nurse practitioner.   her blood pressure has remained persistently elevated as to the number of 200/100 mmHg.  This is documented at her most recent kidney specialist visit.  Similarly it has been observed at the eye doctor visit.  She is also known to have hypertensive eye disease.  She does have nephrotic range proteinuria.  She is pretty much stayed home during the current COVID-19 pandemic.  She denies any fever, shortness of breath.  Her appetite remains fair.  She denies any decline in her energy levels.      The following portions of the patient's history were reviewed and updated as appropriate: allergies, current medications, past family history,  past medical history, past social history, past surgical history and problem list.    No Known Allergies    Current Outpatient Medications on File Prior to Visit   Medication Sig Dispense Refill     blood glucose test (CONTOUR TEST STRIPS) strips Use 1 each As Directed 4 (four) times a day. 400 strip 3     blood-glucose meter (CONTOUR NEXT EZ METER) Misc Use 4 x day 1 each 0     blood-glucose meter (ONETOUCH VERIO SYSTEM) Misc Use 1 kit As Directed 4 (four) times a day. 1 each 0     generic lancets (FINGERSTIX LANCETS) Use up to 4 lancets daily as directed. 100 each 3     glipiZIDE (GLUCOTROL XL) 5 MG 24 hr tablet Take two tablets daily 30 tablet 3     losartan (COZAAR) 100 MG tablet TAKE 1 TABLET(100 MG) BY MOUTH DAILY 90 tablet 1     blood glucose test strips Use 1 each As Directed as needed. Dispense brand per patient's insurance at pharmacy discretion. 200 strip 3     blood pressure monitor Kit Use 1 kit As Directed daily. 1 each 0     metFORMIN (GLUCOPHAGE) 500 MG tablet Take 2 tablets (1,000 mg total) by mouth 2 (two) times a day with meals. 120 tablet 3     NEEDLES, INSULIN DISPOSABLE (BD ULTRA-FINE KAREN PEN NEEDLES MISC) Use 1 Units As Directed Daily at 8:00 am..       OZEMPIC 0.25 mg or 0.5 mg(2 mg/1.5 mL) PnIj INJECT 0.25MG SUBCUTANEOUSLY ONCE WEEKLY FOR 4 WEEKS THEN INCREASE TO 0.5MG WEEKLY.       No current facility-administered medications on file prior to visit.        Patient Active Problem List   Diagnosis     Ganglion     HTN (hypertension)     Smoking     Obesity     Microalbuminuria     Renal failure, unspecified chronicity     Obesity (BMI 30.0-34.9)     Diabetes (H)     Hypertensive retinopathy of both eyes     Nephrotic range proteinuria       Past Medical History:   Diagnosis Date     Diabetes (H) 2/9/2020     Diabetes mellitus (H)      HTN (hypertension) 9/14/2015     Hypertensive retinopathy of both eyes 4/19/2013     Nephrotic range proteinuria 6/11/2020     Obesity (BMI 30.0-34.9) 2/9/2020      Renal failure, unspecified chronicity 2/9/2020     Smoking 5/4/2016       No past surgical history on file.    Family History   Problem Relation Age of Onset     Diabetes Mother         50     Stroke Mother      No Medical Problems Father      No Medical Problems Sister      No Medical Problems Brother      Stroke Paternal Aunt        Social History     Socioeconomic History     Marital status: Single     Spouse name: None     Number of children: None     Years of education: None     Highest education level: None   Occupational History     None   Social Needs     Financial resource strain: None     Food insecurity     Worry: None     Inability: None     Transportation needs     Medical: None     Non-medical: None   Tobacco Use     Smoking status: Current Every Day Smoker     Packs/day: 0.25     Types: Cigarettes     Smokeless tobacco: Never Used   Substance and Sexual Activity     Alcohol use: No     Drug use: No     Sexual activity: None   Lifestyle     Physical activity     Days per week: None     Minutes per session: None     Stress: None   Relationships     Social connections     Talks on phone: None     Gets together: None     Attends Congregational service: None     Active member of club or organization: None     Attends meetings of clubs or organizations: None     Relationship status: None     Intimate partner violence     Fear of current or ex partner: None     Emotionally abused: None     Physically abused: None     Forced sexual activity: None   Other Topics Concern     None   Social History Narrative    Lives alone at dad's home. 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  Partnered for 3 years. No children    Has has step kids ( 2) 18 and 8.        Toby Howard MD  2/7/2020           Review of Systems  A 12 point comprehensive review of systems was negative except as noted.       Objective:   BP (!) 195/109 (Patient Site: Left Arm, Patient Position: Sitting, Cuff Size: Adult Large)   Pulse  95   Resp 16   Wt 186 lb 4 oz (84.5 kg)   LMP 06/02/2020 (Approximate)   BMI 33.50 kg/m      GENERAL: Healthy, alert and no distress  EYES: Eyes grossly normal to inspection. No discharge or erythema, or obvious scleral/conjunctival abnormalities.  RESP: No audible wheeze, cough, or visible cyanosis.  No visible retractions or increased work of breathing.    NEURO: Cranial nerves grossly intact. Mentation and speech appropriate for age.  PSYCH: Mentation appears normal, affect normal/bright, judgement and insight intact, normal speech and appearance well-groomed

## 2021-06-08 NOTE — TELEPHONE ENCOUNTER
Refill Approved    Rx renewed per Medication Renewal Policy. Medication was last renewed on 6/5/2020 with 1 refill. Change in pharmacy noted.   Last office visit: 3/27/2020 Virtual visit with PCP Dr MARYSOL Howard.    Jil Buenrostro, [facility] Triage/Med Refill 6/9/2020     Requested Prescriptions   Pending Prescriptions Disp Refills     losartan (COZAAR) 100 MG tablet [Pharmacy Med Name: LOSARTAN 100MG TABLETS] 30 tablet 0     Sig: TAKE 1 TABLET(100 MG) BY MOUTH DAILY       Angiotensin Receptor Blocker Protocol Passed - 6/7/2020 12:02 PM        Passed - PCP or prescribing provider visit in past 12 months       Last office visit with prescriber/PCP: Visit date not found OR same dept: 2/7/2020 Toby Howard MD OR same specialty: 2/7/2020 Toby Howard MD  Last physical: Visit date not found Last MTM visit: Visit date not found   Next visit within 3 mo: Visit date not found  Next physical within 3 mo: Visit date not found  Prescriber OR PCP: Samson Roe MD  Last diagnosis associated with med order: 1. Essential hypertension  - losartan (COZAAR) 100 MG tablet [Pharmacy Med Name: LOSARTAN 100MG TABLETS]; TAKE 1 TABLET(100 MG) BY MOUTH DAILY  Dispense: 30 tablet; Refill: 0    If protocol passes may refill for 12 months if within 3 months of last provider visit (or a total of 15 months).             Passed - Serum potassium within the past 12 months     Lab Results   Component Value Date    Potassium 4.6 02/07/2020             Passed - Blood pressure filed in past 12 months     BP Readings from Last 1 Encounters:   02/07/20 (!) 181/116             Passed - Serum creatinine within the past 12 months     Creatinine   Date Value Ref Range Status   02/07/2020 2.33 (H) 0.60 - 1.10 mg/dL Final

## 2021-06-08 NOTE — TELEPHONE ENCOUNTER
Please inform patient that I have received the report of her eye exam.  It is noted that she has eye disease related to hypertension.  Better control of blood pressure is needed.  I would advise that she schedule a clinic visit with me either tomorrow or Friday.  If that is not feasible then we can do a video visit next week.    We need to titrate up her blood pressure medication to goal blood pressure of 130/80 mmHg or less    Toby Howard MD  6/10/2020

## 2021-06-08 NOTE — TELEPHONE ENCOUNTER
Refill Approved    Rx renewed per Medication Renewal Policy. Medication was last renewed on 2/7/20.    Shadia Bui, [facility] Triage/Med Refill 5/29/2020     Requested Prescriptions   Pending Prescriptions Disp Refills     blood glucose test (CONTOUR TEST STRIPS) strips 100 strip 1     Sig: Use 1 each As Directed 4 (four) times a day.       Diabetic Supplies Refill Protocol Passed - 5/26/2020  4:48 PM        Passed - Visit with PCP or prescribing provider visit in last 6 months     Last office visit with prescriber/PCP: 2/7/2020 Toby Howard MD OR same dept: 2/7/2020 Toby Howard MD OR same specialty: 2/7/2020 Toby Howard MD  Last physical: Visit date not found Last MTM visit: Visit date not found   Next visit within 3 mo: Visit date not found  Next physical within 3 mo: Visit date not found  Prescriber OR PCP: Toby Howard MD  Last diagnosis associated with med order: 1. Diabetes (H)  - blood glucose test (CONTOUR TEST STRIPS) strips; Use 1 each As Directed 4 (four) times a day.  Dispense: 100 strip; Refill: 1    If protocol passes may refill for 12 months if within 3 months of last provider visit (or a total of 15 months).             Passed - A1C in last 6 months     Hemoglobin A1c   Date Value Ref Range Status   02/07/2020 11.0 (H) 3.5 - 6.0 % Final

## 2021-06-08 NOTE — TELEPHONE ENCOUNTER
Refill Approved    Rx renewed per Medication Renewal Policy. Medication was last renewed on 2/7/20.    Shadia Bui, [facility] Triage/Med Refill 5/14/2020     Requested Prescriptions   Pending Prescriptions Disp Refills     blood glucose test strips  0     Sig: Use 1 each As Directed as needed. Dispense brand per patient's insurance at pharmacy discretion.       Diabetic Supplies Refill Protocol Passed - 5/13/2020  3:41 PM        Passed - Visit with PCP or prescribing provider visit in last 6 months     Last office visit with prescriber/PCP: 2/7/2020 Toby Howard MD OR same dept: 2/7/2020 Toby Howard MD OR same specialty: 2/7/2020 Toby Howard MD  Last physical: Visit date not found Last MTM visit: Visit date not found   Next visit within 3 mo: Visit date not found  Next physical within 3 mo: Visit date not found  Prescriber OR PCP: Toby Howard MD  Last diagnosis associated with med order: There are no diagnoses linked to this encounter.  If protocol passes may refill for 12 months if within 3 months of last provider visit (or a total of 15 months).             Passed - A1C in last 6 months     Hemoglobin A1c   Date Value Ref Range Status   02/07/2020 11.0 (H) 3.5 - 6.0 % Final

## 2021-06-08 NOTE — PATIENT INSTRUCTIONS - HE
I have seen you today for follow-up of blood pressure.  Noting that you have had persistent high blood pressure, I have discussed your case with on-call doctor with associated nephrology.  At this time, I will start you on amlodipine 10 mg daily.    This medication can cause swelling of legs or feet.  If that is the case, you will let me know via [facility] or by calling my office and leaving a message.  We will then change the medication.      Per plan of care discussed with nephrologist, we still have room to try thiazide diuretics.  If, that does not work then we will try a beta-blocker called carvedilol which has better blood pressure control.    I will see her back in 1 month.    Keep checking of your  blood pressure at home.    Toby Howard MD  6/11/2020

## 2021-06-09 NOTE — TELEPHONE ENCOUNTER
Refill request via fax from [facility], [place]      See other refill request from [facility].

## 2021-06-09 NOTE — TELEPHONE ENCOUNTER
Medication Request  Medication name: Glucose meter, One touch  Requested Pharmacy: [facility]  Reason for request: Patient has insurance change and will only cover the One Touch brand.  When did you use medication last?:  NA  Patient offered appointment:  N/A - electronic request  Okay to leave a detailed message: yes       AND    Medication Request  Medication name: Glucose strips, One touch  Requested Pharmacy: [facility]  Reason for request: Patient has insurance change and will only cover the One Touch brand.  When did you use medication last?:  NA  Patient offered appointment:  N/A - electronic request  Okay to leave a detailed message: yes    AND    Medication Request  Medication name: Lancets, One touch  Requested Pharmacy: [facility]  Reason for request: Patient has insurance change and will only cover the One Touch brand.  When did you use medication last?:  NA  Patient offered appointment:  N/A - electronic request  Okay to leave a detailed message: yes

## 2021-06-09 NOTE — TELEPHONE ENCOUNTER
Refill Approved    Rx renewed per Medication Renewal Policy. Medication was last renewed on 6/17/20, last OV 6/11/20.    Do Canchola, [facility] Triage/Med Refill 7/11/2020     Requested Prescriptions   Pending Prescriptions Disp Refills     glipiZIDE (GLUCOTROL XL) 5 MG 24 hr tablet 180 tablet 3     Sig: Take two tablets daily       Oral Hypoglycemics Refill Protocol Passed - 7/9/2020  5:38 PM        Passed - Visit with PCP or prescribing provider visit in last 6 months       Last office visit with prescriber/PCP: 6/11/2020 OR same dept: 6/11/2020 Toby Howard MD OR same specialty: 6/11/2020 Toby Howard MD Last physical: Visit date not found Last MTM visit: Visit date not found         Next appt within 3 mo: Visit date not found  Next physical within 3 mo: Visit date not found  Prescriber OR PCP: Toby Howard MD  Last diagnosis associated with med order: 1. Diabetes (H)  - glipiZIDE (GLUCOTROL XL) 5 MG 24 hr tablet; Take two tablets daily  Dispense: 180 tablet; Refill: 3     If protocol passes may refill for 12 months if within 3 months of last provider visit (or a total of 15 months).           Passed - A1C in last 6 months     Hemoglobin A1c   Date Value Ref Range Status   02/07/2020 11.0 (H) 3.5 - 6.0 % Final               Passed - Microalbumin in last year      Microalbumin, Random Urine   Date Value Ref Range Status   02/07/2020 483.09 (H) 0.00 - 1.99 mg/dL Final                  Passed - Blood pressure in last year     BP Readings from Last 1 Encounters:   06/11/20 (!) 195/109             Passed - Serum creatinine in last year     Creatinine   Date Value Ref Range Status   02/07/2020 2.33 (H) 0.60 - 1.10 mg/dL Final

## 2021-06-10 NOTE — PROGRESS NOTES
HPI:  Painful lump on the left vaginal area.   Pain at 10/10.  Constant for a whole day.  No fever.  Has had one similar lesion about a year ago.             Current Outpatient Prescriptions on File Prior to Visit   Medication Sig Dispense Refill     blood glucose test (CONTOUR TEST STRIPS) Strp Use 1 strip As Directed 4 (four) times a day. 100 strip 3     buPROPion (WELLBUTRIN SR) 150 MG 12 hr tablet Take 1 tablet (150 mg total) by mouth 2 (two) times a day. 60 tablet 2     glipiZIDE (GLUCOTROL) 10 MG 24 hr tablet Take two tablets daily 60 tablet 1     lancets (FINGERSTIX LANCETS) Misc Use up to 4 lancets daily as directed. 100 each 3     NEEDLES, INSULIN DISPOSABLE (BD ULTRA-FINE KAREN PEN NEEDLES MISC) Use 1 Units As Directed Daily at 8:00 am..       No current facility-administered medications on file prior to visit.        Pmh: reviewed  Psh: reviewed  Allergy:  reviewed      EXAM:    BP (!) 158/100 (Patient Site: Left Arm, Patient Position: Sitting, Cuff Size: Adult Regular)  Pulse (!) 118  Temp 99  F (37.2  C) (Oral)   Resp 16  Wt 192 lb 14.4 oz (87.5 kg)  LMP 04/04/2017 (Approximate)  BMI 34.72 kg/m2  GEN:   ALERT, NAD, ORIENTED TIMES THREE  :  Left labial golfball sized  Swelling with tx and redness.  Minimal drainage.  SKIN:  Redness over the left labia    No results found for this or any previous visit (from the past 168 hour(s)).    DIAGNOSIS:  1. Abscess of labia  Ambulatory referral to Gynecology       PLAN:  Patient Instructions   To see gyn today to  I and D left labial abscess

## 2021-06-13 NOTE — TELEPHONE ENCOUNTER
Patient was recently seen at an eye doctor and her blood pressure was very high.  Please schedule a virtual visit for this patient if she is agreeable.     Toby Howard MD  12/17/2020

## 2021-06-14 NOTE — PROGRESS NOTES
Alysia Hernandez is a 35 y.o. female who is being evaluated via a billable video visit.      How would you like to obtain your AVS? Mail a copy.  If dropped from the video visit, the video invitation should be resent by: Text to cell phone: [phone]  Will anyone else be joining your video visit? No      Video Start Time: 10: 47  Assessment & Plan     Alysia was seen today for hypertension.    Diagnoses and all orders for this visit:    Essential hypertension  -     Comprehensive Metabolic Panel; Future    Type 2 diabetes mellitus with chronic kidney disease, without long-term current use of insulin, unspecified CKD stage (H)  -     Glycosylated Hemoglobin A1C; Future  -     Comprehensive Metabolic Panel; Future  -     Lipid Cascade RANDOM; Future  -     Ambulatory referral to [facility])    Microalbuminuria    Renal failure, unspecified chronicity  -     Ambulatory referral to [facility])    Obesity (BMI 30.0-34.9)    Hypertensive retinopathy of both eyes  -     Comprehensive Metabolic Panel; Future  -     Ambulatory referral to [facility])    Nephrotic range proteinuria  -     Microalbumin, Random Urine; Future  -     Ambulatory referral to [facility])    Smoking    Medical decision making: Alysia is a 35-year-old young female with the diabetes type 2, renal failure, hypertensive retinopathy and proteinuria.  [facility].  She is not sure about plan of care with the nephrologist as she never hears back from them regarding her lab work.  After discussion, she is wanting to change to [facility] and a referral will be done.  She was last seen at [facility] in November 2020.  We do not have the records and we will obtain them.  Regarding her diabetes, we do not have recent  "hemoglobin A1c.  Patient is willing to come for lab only visit.  Test of been ordered.  She was seen by endocrinology last year and had excellent response to glipizide and Ozempic.  She had had low blood sugars with diet and exercise and her Ozempic was discontinued.  However, she says that she has had dietary indiscretion.  She had quit smoking December 2020 but started smoking again.  She has joined a gym and is trying lifestyle modification.    She will come next week for lab visit and a nurse visit for blood pressure check.  She will bring her blood pressure monitor to be calibrated.    Review of external notes as documented above   Reviewed notes from [facility].             Tobacco Cessation:   reports that she has been smoking cigarettes. She has been smoking about 0.25 packs per day. She has never used smokeless tobacco.  The following high BMI interventions were performed this visit: encouragement to exercise, weight monitoring, prescribed activity/exercise education and lifestyle education regarding diet  I have counseled the patient for tobacco cessation and the follow up will occur  at the next visit.  BMI:   Estimated body mass index is 33.5 kg/m  as calculated from the following:    Height as of 2/7/20: 5' 2.52\" (1.588 m).    Weight as of 6/11/20: 186 lb 4 oz (84.5 kg).   The following high BMI interventions were performed this visit: dietary management education, guidance, and counseling      No follow-ups on file.    Toby Howard MD  [facility]    Subjective    Chief Complaint   Patient presents with     Hypertension     Discuss elevated BP, has been monitoring at home. When not smoking readings are at 130-150/ 80/84. When smoking readings are at 185/100.         Alysia Hernandez is 35 y.o. and presents to clinic today for the following health issues   HPI       Diabetes Follow-up    How often are you checking your blood sugar? A few times " a week  What time of day are you checking your blood sugars (select all that apply)?  Before meals and After meals  Have you had any blood sugars above 200?  No  Have you had any blood sugars below 70?  No  What symptoms do you notice when your blood sugar is low?  None        What concerns do you have today about your diabetes? None     Do you have any of these symptoms? (Select all that apply)  Blurry vision     Have you had a diabetic eye exam in the last 12 months? Yes- Date of last eye exam: 12/2020    BP Readings from Last 2 Encounters:   06/11/20 (!) 195/109   02/07/20 (!) 181/116     No results found for: LDL          Hypertension Follow-up      Do you check your blood pressure regularly outside of the clinic? Yes     Are you following a low salt diet? No    Are your blood pressures ever more than 140 on the top number (systolic) OR more       than 90 on the bottom number (diastolic), for example 140/90? Yes    Chronic Kidney Disease Follow-up      [facility]      Review of Systems  Does not report chest pain, shortness of breath, GI symptoms      Objective    Vitals - Patient Reported  Weight (Patient Reported): 196 lb (88.9 kg)(As reported by Pt)    Physical Exam  GENERAL: Healthy, alert and no distress  EYES: Eyes grossly normal to inspection. No discharge or erythema, or obvious scleral/conjunctival abnormalities.  RESP: No audible wheeze, cough, or visible cyanosis.  No visible retractions or increased work of breathing.    NEURO: Cranial nerves grossly intact. Mentation and speech appropriate for age.  PSYCH: Mentation appears normal, affect normal/bright, judgement and insight intact, normal speech and appearance well-groomed          Video-Visit Details    Type of service:  Video Visit    Video End Time (time video stopped): 10: 31  Originating Location (pt. Location): Home    Distant Location (provider location):  [facility]     Platform used for  Video Visit: Av

## 2021-06-14 NOTE — PROGRESS NOTES
Please inform patient that her blood pressure is not under good control.  Did she take both her losartan and amlodipine before coming to the clinic?  If not, she should take both medications and then come for blood pressure check with me.  If her blood pressure continues to be high, we will have to discuss additional medication.  I may talk to her nephrologist when she comes back.    I have sent her message with my chart regarding her labs also.    I do encourage that she schedule a follow-up in 1 month with me.    Toby Howard MD  1/30/2021

## 2021-06-14 NOTE — PROGRESS NOTES
Follow Up Blood Pressure Check    Alysia Hernandez is a 35 y.o. female recommended to follow up for blood pressure check by Toby Howard MD. Anihypertensive medications and adherence were verified: Yes.     Reason for visit:     Medication change at last visit:     Today's Vitals:   Vitals:    01/29/21 1436 01/29/21 1448   BP: (!) 176/92 163/85   Patient Site: Right Arm Right Arm   Patient Position: Sitting Sitting   Cuff Size: Adult Large Adult Large   Pulse: (!) 105 96         Lowest blood pressure today is 163/85 and they deny signs or symptoms of new onset: severe headache, fatigue, confusion, vision changes, chest pain, pounding in the chest, neck, ears, irregular heartbeat, difficulty breathing and blood in the urine.  Please inform patient of his/her blood pressure today.  If they are asymptomatic, the patient is to continue current medications.  This message will be routed to their provider, and they will be notified if a change in medication is recommended.      Maria Fernanda Sarmiento    Current Outpatient Medications   Medication Sig Dispense Refill     amLODIPine (NORVASC) 10 MG tablet Take 1 tablet (10 mg total) by mouth daily. 90 tablet 3     blood glucose meter (ONETOUCH ULTRAMINI) Use 1 each As Directed as needed. Dispense glucometer brand per patient's insurance at pharmacy discretion. 1 each 0     blood glucose test strips Use 1 each As Directed daily. Dispense brand per patient's insurance at pharmacy discretion. 100 strip 3     glipiZIDE (GLUCOTROL XL) 5 MG 24 hr tablet Take two tablets daily 180 tablet 2     lancets (ONETOUCH DELICA LANCETS) 30 gauge Misc Use as direc 100 each 3     losartan (COZAAR) 100 MG tablet Take 1 tablet (100 mg total) by mouth daily. 90 tablet 1     NEEDLES, INSULIN DISPOSABLE (BD ULTRA-FINE KAREN PEN NEEDLES MISC) Use 1 Units As Directed Daily at 8:00 am..       No current facility-administered medications for this visit.

## 2021-06-15 NOTE — TELEPHONE ENCOUNTER
Left message to call back for: BP check  Information to relay to patient:  LMTCB, Please see message below from provider.         Toby Howard MD at 1/30/2021  6:35 PM    Status: Signed      Please inform patient that her blood pressure is not under good control.  Did she take both her losartan and amlodipine before coming to the clinic?  If not, she should take both medications and then come for blood pressure check with me.  If her blood pressure continues to be high, we will have to discuss additional medication.  I may talk to her nephrologist when she comes back.     I have sent her message with my chart regarding her labs also.     I do encourage that she schedule a follow-up in 1 month with me.     Toby Howard MD  1/30/2021

## 2021-06-15 NOTE — TELEPHONE ENCOUNTER
----- Message from Toby Howard MD sent at 1/30/2021  6:35 PM CST -----      ----- Message -----  From: Maria Fernanda Sarmiento CMA  Sent: 1/29/2021   3:01 PM CST  To: Toby Howard MD

## 2021-06-16 NOTE — PROGRESS NOTES
Assessment & Plan   1. Essential hypertension     2. Diabetes (H)  glipiZIDE (GLUCOTROL XL) 5 MG 24 hr tablet   3. CKD (chronic kidney disease) stage 4, GFR 15-29 ml/min (H)     4. Obesity (BMI 30.0-34.9)     5. Smoking       Medical decision makin-year-old patient with essential hypertension, diabetes, CKD stage IV, obesity and smoking is here today for follow-up.  For obesity, she is joined a challenge and is going to gym at least 3 times a week for 2 hours each.  She has lost about 9 pounds.  She had quit smoking and was smoke-free for 1 month and now relapsed.  She is going to try again.  Blood pressure is under fair control compared to previous.  She does get nervous when she comes to doctor's office.  Her diabetes is under good control and I have cut down her dose to 5 mg noting episodes of low blood sugar.  She already was avoiding evening dose of her glipizide.  We discussed future need for cholesterol-lowering medication.     Return in about 2 months (around 2021) for recheck, Diabetes follow up.    Toby Howard MD  [facility]    Subjective    Chief Complaint   Patient presents with     Follow-up     blood pressure       Alysia Hernandez is 35 y.o. and presents today for the following health issues   HPI patient is here for follow-up of her diabetes and hypertension.  She has started exercising and is trying to participate in fastest lose her first.  She is started smoking again.      Diabetes Follow-up    How often are you checking your blood sugar? Three times daily  Blood sugar testing frequency justification:  Risk of hypoglycemia with medication(s)  What time of day are you checking your blood sugars (select all that apply)?  Before and after meals  Have you had any blood sugars above 200?  No  Have you had any blood sugars below 70?  Yes: 2-3 /  weeks    What symptoms do you notice when your blood sugar is low?  Shaky and Dizzy    What concerns do you have  "today about your diabetes? None and Low blood sugar     Do you have any of these symptoms? (Select all that apply)  No numbness or tingling in feet.  No redness, sores or blisters on feet.  No complaints of excessive thirst.  No reports of blurry vision.  No significant changes to weight.     Have you had a diabetic eye exam in the last 12 months? Yes- Date of last eye exam: 03/22/2021 at [facility]        Hypertension Follow-up      Do you check your blood pressure regularly outside of the clinic? Yes     Are you following a low salt diet? Yes    Are your blood pressures ever more than 140 on the top number (systolic) OR more   than 90 on the bottom number (diastolic), for example 140/90? Yes    BP Readings from Last 2 Encounters:   03/26/21 143/84   01/29/21 163/85     No results found for: LDL    How many servings of fruits and vegetables do you eat daily?  4 or more    On average, how many sweetened beverages do you drink each day (Examples: soda, juice, sweet tea, etc.  Do NOT count diet or artificially sweetened beverages)?   0    How many days per week do you exercise enough to make your heart beat faster? 4    How many minutes a day do you exercise enough to make your heart beat faster? 60 or more    How many days per week do you miss taking your medication? 0      Review of Systems  No respiratory or cardiac issue.  No nausea or vomiting.      Objective    /84   Pulse 89   Resp 16   Ht 5' 2.52\" (1.588 m)   Wt 190 lb 3.2 oz (86.3 kg)   BMI 34.21 kg/m    Body mass index is 34.21 kg/m .  Physical Exam  GENERAL: Healthy, alert and no distress  EYES: Eyes grossly normal to inspection. No discharge or erythema, or obvious scleral/conjunctival abnormalities.  RESP: No audible wheeze, cough, or visible cyanosis.  No visible retractions or increased work of breathing.    MS: Monofilament exam is normal  NEURO: Cranial nerves grossly intact. Mentation and speech appropriate for age.  PSYCH: Mentation " appears normal, affect normal/bright, judgement and insight intact, normal speech and appearance well-groomed

## 2021-06-17 NOTE — TELEPHONE ENCOUNTER
Telephone Encounter by Margaret Raines MA at 1/15/2021 10:24 AM     Author: Margaret Raines MA Service: -- Author Type: Certified Medical Assistant    Filed: 1/15/2021 10:25 AM Encounter Date: 1/15/2021 Status: Signed    : Margaret Raines MA (Certified Medical Assistant)       Alysia Hernandez  Patient Customer Service Request Pool 35 minutes ago (9:49 AM)        Topic: Procedural Question     Hello,     I requested a 3 month supply of losartan and amlodipine and gliplizide from [facility] but they were unable to approve losartan and amlodipine they said they sent a request to you guys for renewal on January 8.

## 2021-06-18 NOTE — PATIENT INSTRUCTIONS - HE
Patient Instructions by Toby Howard MD at 2/7/2020  1:30 PM     Author: Toby Howard MD Service: -- Author Type: Physician    Filed: 2/7/2020  2:06 PM Encounter Date: 2/7/2020 Status: Addendum    : Toby Howard MD (Physician)    Related Notes: Original Note by Toby Howard MD (Physician) filed at 2/7/2020  1:56 PM       Patient Education     Long-Term Complications of Diabetes  Diabetes can cause health problems over time. These are called complications. They are more likely to happen if your blood sugar is often too high. Over time, high blood sugar can damage blood vessels in your body. It is important to keep your blood sugar in your target range. This can help prevent or delay complications from diabetes.  Possible complications  Diabetes can cause:    Eye problems, such as damage to the blood vessels in the eyes, pressure in the eye, and clouding of the eyes lens. In time, eye problems can lead to bleeding and blindness.     Tooth and gum problems. These can cause loss of teeth and bone.    Blood vessel disease, leading to circulation problems, heart attack, or stroke. Or you may need a limb removed because of poor blood flow to the skin.    Problems with sexual function. Men may have erectile dysfunction. Women may have sexual discomfort.     Kidney disease, leading to kidney failure. You may need dialysis or a kidney transplant.     Nerve problems, causing pain or loss of feeling in your feet and other parts of your body. This may lead to loss of a limb.     High blood pressure. This health problem puts strain on your heart and blood vessels.    Serious infections. These can possibly lead to loss of toes, feet, or limbs.  How to prevent complications  You can prevent these serious problems by managing your blood sugar, blood pressure, and cholesterol. This can help you feel better and stay healthy. You can control diabetes by tracking your blood sugar. You can also eat healthy  and exercise to prevent weight gain. If you smoke, stop all tobacco products. It will greatly improve your overall health. And take medicine if directed by your healthcare provider.  Date Last Reviewed: 8/1/2018 2000-2019 [employer]. [address] [place] [address], [place] [address]. All rights reserved. This information is not intended as a substitute for professional medical care. Always follow your healthcare professional's instructions.         Patient Education     How to Quit Smoking  Smoking is one of the hardest habits to break. About half of all people who have ever smoked have been able to quit. Most people who still smoke want to quit. Here are some of the best ways to stop smoking.    Keep trying  Most smokers make many attempts at quitting before they are successful. Its important not to give up.  Go cold turkey  Most former smokers quit cold turkey (all at once). Trying to cut back gradually doesn't seem to work as well, perhaps because it continues the smoking habit. Also, it is possible to inhale more while smoking fewer cigarettes. This results in the same amount of nicotine in your body.  Get support  Support programs can be a big help, especially for heavy smokers. These groups offer lectures, ways to change behavior, and peer support. Here are some ways to find a support program:    Free national quitline: 800aPriori TechnologiesQUIT-NOW (637-088-5187).    Hospital quit-smoking programs.    American Lung Association: (448.444.2352).    American Cancer Society (241-752-7345).  Support at home is important too. Nonsmokers can offer praise and encouragement. If the smoker in your life finds it hard to quit, encourage them to keep trying.  Over-the-counter medicines  Nicotine replacement therapy may make quitting easier. Certain aids, such as the nicotine patch, gum, and lozenges, are available without a prescription. It is best to use these under a doctors care, though. The skin patch provides a steady  "supply of nicotine. Nicotine gum and lozenges give temporary bursts of low levels of nicotine. Both methods reduce the craving for cigarettes. Warning: If you have nausea, vomiting, dizziness, weakness, or a fast heartbeat, stop using these products and see your doctor.  Prescription medicines  After reviewing your smoking patterns and past attempts to quit, your doctor may offer a prescription medicine such as bupropion, varenicline, a nicotine inhaler, or nasal spray. Each has advantages and side effects. Your doctor can review these with you.  Health benefits of quitting  The benefits of quitting start right away and keep improving the longer you go without smoking. These benefits occur at any age.  So whether you are 17 or 70, quitting is a good decision. Some of the benefits include:    20 minutes: Blood pressure and pulse return to normal.    8 hours: Oxygen levels return to normal.    2 days: Ability to smell and taste begin to improve as damaged nerves regrow.    2 to 3 weeks: Circulation and lung function improve.    1 to 9 months: Coughing, congestion, and shortness of breath decrease; tiredness decreases.    1 year: Risk of heart attack decreases by half.    5 years: Risk of lung cancer decreases by half; risk of stroke becomes the same as a nonsmokers.  For more on how to quit smoking, try these online resources:     Smokefree.gov    \"Clearing the Air\" booklet from the National Cancer [place]: smokefree.gov/sites/default/files/pdf/clearing-the-air-accessible.pdf  Date Last Reviewed: 3/1/2017    7013-0399 [employer]. [address] [place] [address], [place] [address]. All rights reserved. This information is not intended as a substitute for professional medical care. Always follow your healthcare professional's instructions.                "

## 2021-06-18 NOTE — PATIENT INSTRUCTIONS - HE
Patient Instructions by Toby Howard MD at 3/26/2021  9:30 AM     Author: Toby Howard MD Service: -- Author Type: Physician    Filed: 3/26/2021 10:10 AM Encounter Date: 3/26/2021 Status: Signed    : Toby Howard MD (Physician)       Patient Education     Low Blood Sugar (Hypoglycemia)  Having too little sugar (glucose) in your blood is called low blood sugar (hypoglycemia). Low blood sugar often means anything lower than 70 mg/dL. Talk with your healthcare provider about your target range. Ask what level is too low for you. Diabetes itself doesnt cause low blood sugar. But some treatments for diabetes may raise your risk for it. These include pills or insulin. Low blood sugar may make you pass out or have a seizure. So always treat low blood sugar right away. But don't overeat.     Special note: Always carry a source of fast-acting sugar and a snack in case of hypoglycemia.  What you may notice  If you have low blood sugar, you may have one or more of these symptoms:    Shakiness or dizziness    Cold, clammy skin or sweating    Feeling hungry    Headache    Nervousness    A hard, fast heartbeat    Weakness    Confusion or irritability    Blurred eyesight    Having nightmares or waking up confused or sweating    Numbness or tingling in the lips or tongue  What you should do  Here are tips to follow if you have hypoglycemia:     First check your blood sugar. If it's too low (out of your target range), eat or drink 15 to 20 grams of fast-acting sugar. This may be 3 to 4 glucose tablets, 4 ounces (half a cup) of fruit juice or regular (nondiet) soda, or 1 tablespoon of honey. Dont take more than this, or your blood sugar may go too high.    Don't eat foods high in protein such as milk or nuts to treat hypoglycemia. Protein may increase your insulin response. It may lower your blood sugar even more.    Wait 15 minutes. Then recheck your blood sugar if you can.    If your blood sugar is still too  low, repeat the steps above and check your blood sugar again. If your blood sugar still has not gone back to your target range, contact your healthcare provider. Or seek emergency care.    Once your blood sugar is back at target range, eat a snack or meal.  Preventing low blood sugar  Things you can do include the following:     If your condition needs a strict treatment plan, eat meals and snacks at the same times each day. Dont skip meals!    If your treatment plan lets you change when and what you eat, learn how to change the time and dose of your rapid-acting insulin to match this.     Ask your healthcare provider if it's safe to drink alcohol. Never drink on an empty stomach.    Take your medicine at the prescribed times.    Always carry a source of fast-acting sugar and a snack when youre away from home.    If you have had several hypoglycemic episodes, talk with your healthcare provider. See if you may be able to take less medicine. You also may have a condition where you no longer recognize the symptoms of low blood sugar until the value falls to dangerous levels.  Other things to do  Other tips include:    Carry a medical ID card or wear a medical alert bracelet or necklace. It should say that you have diabetes. It should also say what to do if you pass out or have a seizure.    Make sure your family, friends, and coworkers know the signs of low blood sugar. Tell them what to do if your blood sugar falls very low and you cant treat yourself.    Keep a glucagon emergency kit handy. Be sure your family, friends, and coworkers know how and when to use it. Check it often. Replace the glucagon before it expires.    Talk with your healthcare team about other things you can do to prevent low blood sugar.     If you have unexplained hypoglycemia or hypoglycemia several times, call your healthcare provider.  Date Last Reviewed: 11/1/2018 2000-2019 [employer]. [address] [facility], [place]  [address]. All rights reserved. This information is not intended as a substitute for professional medical care. Always follow your healthcare professional's instructions.

## 2021-06-19 NOTE — LETTER
Letter by Amy Giles FNP at      Author: Amy Giles FNP Service: -- Author Type: --    Filed:  Encounter Date: 2019 Status: (Other)         Alysia Hernandez  [address]  [place] [address]      2019      Dear Alysia Hernandez,   : 1985      This letter is in regards to the appointment that you had scheduled on 2019 at the [facility] with  NP Amy Giles.     The [facility] strives to see all patients in a timely manner and we need your help to achieve this.  The above-mentioned appointment was missed and we do not have record of a cancellation by you.  Whenever possible, we request appointment cancellations at least 72 hours in advance.  This time allows us to offer the appointment to another patient in need.      If you feel you have received this letter in error, or if you need to reschedule this appointment, please call our office so that we may update our records.      Sincerely,    [facility], [place]

## 2021-06-25 ENCOUNTER — COMMUNICATION - HEALTHEAST (OUTPATIENT)
Dept: FAMILY MEDICINE | Facility: CLINIC | Age: 36
End: 2021-06-25

## 2021-06-25 DIAGNOSIS — E11.22 TYPE 2 DIABETES MELLITUS WITH CHRONIC KIDNEY DISEASE, WITHOUT LONG-TERM CURRENT USE OF INSULIN, UNSPECIFIED CKD STAGE (H): ICD-10-CM

## 2021-07-01 ENCOUNTER — COMMUNICATION - HEALTHEAST (OUTPATIENT)
Dept: FAMILY MEDICINE | Facility: CLINIC | Age: 36
End: 2021-07-01

## 2021-07-01 DIAGNOSIS — I10 ESSENTIAL HYPERTENSION: ICD-10-CM

## 2021-07-04 NOTE — TELEPHONE ENCOUNTER
Telephone Encounter by Do Canchola, RN at 7/1/2021  7:38 PM     Author: Do Canchola, RN Service: -- Author Type: Registered Nurse    Filed: 7/1/2021  7:40 PM Encounter Date: 7/1/2021 Status: Signed    : Do Canchola, RN (Registered Nurse)       Refill Approved    Rx renewed per Medication Renewal Policy. Medication was last renewed on 1/15/21, last OV 3/26/21.    Do Canchola, [facility] Triage/Med Refill 7/1/2021     Requested Prescriptions   Pending Prescriptions Disp Refills   ? losartan (COZAAR) 100 MG tablet 90 tablet 1     Sig: Take 1 tablet (100 mg total) by mouth daily.       Angiotensin Receptor Blocker Protocol Passed - 7/1/2021  4:41 PM        Passed - PCP or prescribing provider visit in past 12 months       Last office visit with prescriber/PCP: 3/26/2021 Toby Howard MD OR same dept: 3/26/2021 Toby Howard MD OR same specialty: 3/26/2021 Toby Howard MD  Last physical: Visit date not found Last MTM visit: Visit date not found   Next visit within 3 mo: Visit date not found  Next physical within 3 mo: Visit date not found  Prescriber OR PCP: Toby Howard MD  Last diagnosis associated with med order: 1. Essential hypertension  - losartan (COZAAR) 100 MG tablet; Take 1 tablet (100 mg total) by mouth daily.  Dispense: 90 tablet; Refill: 1    If protocol passes may refill for 12 months if within 3 months of last provider visit (or a total of 15 months).             Passed - Serum potassium within the past 12 months     Lab Results   Component Value Date    Potassium 5.0 01/29/2021             Passed - Blood pressure filed in past 12 months     BP Readings from Last 1 Encounters:   03/26/21 143/84             Passed - Serum creatinine within the past 12 months     Creatinine   Date Value Ref Range Status   01/29/2021 3.19 (H) 0.60 - 1.10 mg/dL Final

## 2021-07-06 ENCOUNTER — COMMUNICATION - HEALTHEAST (OUTPATIENT)
Dept: FAMILY MEDICINE | Facility: CLINIC | Age: 36
End: 2021-07-06

## 2021-07-06 DIAGNOSIS — E11.9 DIABETES (H): ICD-10-CM

## 2021-07-06 NOTE — TELEPHONE ENCOUNTER
Telephone Encounter by Mirna Lazo RN at 7/6/2021  1:37 PM     Author: Mirna Lazo RN Service: -- Author Type: Registered Nurse    Filed: 7/6/2021  1:38 PM Encounter Date: 7/6/2021 Status: Signed    : Mirna Lazo RN (Registered Nurse)       Refill Approved    Rx renewed per Medication Renewal Policy. Medication was last renewed on 3/26/2021.    Mirna Lazo, [facility] Connection Triage/Med Refill 7/6/2021     Requested Prescriptions   Pending Prescriptions Disp Refills   ? glipiZIDE (GLUCOTROL XL) 5 MG 24 hr tablet 90 tablet 2     Sig: Take 1 tablet (5 mg total) by mouth daily. WITH meal       Oral Hypoglycemics Refill Protocol Passed - 7/6/2021 10:13 AM        Passed - Visit with PCP or prescribing provider visit in last 6 months       Last office visit with prescriber/PCP: 3/26/2021 OR same dept: 3/26/2021 Toby Howard MD OR same specialty: 3/26/2021 Toby Howard MD Last physical: Visit date not found Last MTM visit: Visit date not found         Next appt within 3 mo: Visit date not found  Next physical within 3 mo: Visit date not found  Prescriber OR PCP: Toby Howard MD  Last diagnosis associated with med order: 1. Diabetes (H)  - glipiZIDE (GLUCOTROL XL) 5 MG 24 hr tablet; Take 1 tablet (5 mg total) by mouth daily. WITH meal  Dispense: 90 tablet; Refill: 2     If protocol passes may refill for 12 months if within 3 months of last provider visit (or a total of 15 months).           Passed - A1C in last 6 months     Hemoglobin A1c   Date Value Ref Range Status   01/29/2021 6.3 (H) <=5.6 % Final               Passed - Microalbumin in last year      Microalbumin, Random Urine   Date Value Ref Range Status   01/29/2021 276.00 (H) 0.00 - 1.99 mg/dL Final                  Passed - Blood pressure in last year     BP Readings from Last 1 Encounters:   03/26/21 143/84             Passed - Serum creatinine in last year     Creatinine   Date Value Ref Range Status    01/29/2021 3.19 (H) 0.60 - 1.10 mg/dL Final

## 2021-07-07 NOTE — TELEPHONE ENCOUNTER
Refill Approved    Rx renewed per Medication Renewal Policy.     Amelie Gant, [facility] Triage/Med Refill 6/25/2021     Requested Prescriptions   Pending Prescriptions Disp Refills     blood glucose test strips 100 strip 3     Sig: Use 1 each As Directed daily. Dispense brand per patient's insurance at pharmacy discretion.       Diabetic Supplies Refill Protocol Passed - 6/25/2021  3:51 PM        Passed - Visit with PCP or prescribing provider visit in last 6 months     Last office visit with prescriber/PCP: 3/26/2021 Toby Howard MD OR same dept: 3/26/2021 Toby Howard MD OR same specialty: 3/26/2021 Toby Howard MD  Last physical: Visit date not found Last MTM visit: Visit date not found   Next visit within 3 mo: Visit date not found  Next physical within 3 mo: Visit date not found  Prescriber OR PCP: Toby Howard MD  Last diagnosis associated with med order: 1. Type 2 diabetes mellitus with chronic kidney disease, without long-term current use of insulin, unspecified CKD stage (H)  - blood glucose test strips; Use 1 each As Directed daily. Dispense brand per patient's insurance at pharmacy discretion.  Dispense: 100 strip; Refill: 3    If protocol passes may refill for 12 months if within 3 months of last provider visit (or a total of 15 months).             Passed - A1C in last 6 months     Hemoglobin A1c   Date Value Ref Range Status   01/29/2021 6.3 (H) <=5.6 % Final

## 2021-08-12 DIAGNOSIS — I10 ESSENTIAL HYPERTENSION: Primary | ICD-10-CM

## 2021-08-12 RX ORDER — AMLODIPINE BESYLATE 10 MG/1
TABLET ORAL
COMMUNITY
Start: 2021-01-10 | End: 2021-08-12

## 2021-08-12 NOTE — TELEPHONE ENCOUNTER
Rx pended. Looks like pt last saw you in march and you wanted her to follow up in 2 months. Do you want her to schedule an office visit?

## 2021-08-12 NOTE — TELEPHONE ENCOUNTER
Refill request from [facility]    Amlodipine Besylate 10 mg Tablets  Quantity 30  Take 1 Tablet (10 mg) by Mouth Every Day    Please darrius up, not on med list

## 2021-08-13 ENCOUNTER — TELEPHONE (OUTPATIENT)
Dept: FAMILY MEDICINE | Facility: CLINIC | Age: 36
End: 2021-08-13

## 2021-08-13 RX ORDER — AMLODIPINE BESYLATE 10 MG/1
10 TABLET ORAL DAILY
Qty: 90 TABLET | Refills: 3 | Status: SHIPPED | OUTPATIENT
Start: 2021-08-13 | End: 2022-10-29

## 2021-08-16 DIAGNOSIS — I10 ESSENTIAL HYPERTENSION: Primary | ICD-10-CM

## 2021-08-16 RX ORDER — LOSARTAN POTASSIUM 100 MG/1
100 TABLET ORAL DAILY
Qty: 90 TABLET | Refills: 1 | Status: ON HOLD | OUTPATIENT
Start: 2021-08-16 | End: 2021-09-06

## 2021-08-16 RX ORDER — LOSARTAN POTASSIUM 100 MG/1
100 TABLET ORAL DAILY
COMMUNITY
Start: 2021-01-15 | End: 2021-08-16

## 2021-08-16 NOTE — TELEPHONE ENCOUNTER
Central Prior Authorization Team   Phone: [phone]      PA Initiation    Medication: Glipizide  Insurance Company: [employer] - Phone [phone] Fax [phone]  Pharmacy Filling the Rx: [id number] [address] [facility] AT [address]  Filling Pharmacy Phone: [phone]  Filling Pharmacy Fax:    Start Date: 8/16/2021

## 2021-08-16 NOTE — TELEPHONE ENCOUNTER
Fax from pharmacy.   Medication reconciled. Last filled 6/12/2020 per fax from pharmacy.  I don't see documentation that the medication was discontinued.    Pending Prescriptions:                       Disp   Refills    losartan (COZAAR) 100 MG tablet           90 tab*1            Sig: Take 1 tablet (100 mg) by mouth daily    Signed Prescriptions:                        Disp   Refills    losartan (COZAAR) 100 MG tablet                            Sig: Take 100 mg by mouth daily  Authorizing Provider: PATIENT REPORTED  Ordering User: JOCELYN MCCLELLAND

## 2021-08-16 NOTE — TELEPHONE ENCOUNTER
Description has been sent to the pharmacy in [place].  Please check for the patient if this is the correct pharmacy    Toby Howard MD

## 2021-08-16 NOTE — TELEPHONE ENCOUNTER
Prior Authorization Approval    Authorization Effective Date: 8/16/2021  Authorization Expiration Date: 8/16/2022  Medication: Glipizide-APPROVED  Approved Dose/Quantity:   Reference #:     Insurance Company: [employer] [phone] Fax [phone]  Expected CoPay:       CoPay Card Available:      Foundation Assistance Needed:    Which Pharmacy is filling the prescription (Not needed for infusion/clinic administered): [facility] #[id number] - [place] [id number] [facility] AT [address] [place] [address]  Pharmacy Notified: Yes  Patient Notified: No    Pharmacy will notify patient when medication is ready.

## 2021-09-03 ENCOUNTER — HOSPITAL ENCOUNTER (INPATIENT)
Facility: HOSPITAL | Age: 36
LOS: 3 days | Discharge: HOME | End: 2021-09-06
Attending: EMERGENCY MEDICINE | Admitting: INTERNAL MEDICINE
Payer: COMMERCIAL

## 2021-09-03 ENCOUNTER — OFFICE VISIT (OUTPATIENT)
Dept: FAMILY MEDICINE | Facility: CLINIC | Age: 36
End: 2021-09-03
Payer: COMMERCIAL

## 2021-09-03 VITALS
TEMPERATURE: 98.4 F | BODY MASS INDEX: 34.97 KG/M2 | SYSTOLIC BLOOD PRESSURE: 213 MMHG | WEIGHT: 194.4 LBS | RESPIRATION RATE: 16 BRPM | OXYGEN SATURATION: 100 % | DIASTOLIC BLOOD PRESSURE: 117 MMHG | HEART RATE: 105 BPM

## 2021-09-03 DIAGNOSIS — I10 ESSENTIAL HYPERTENSION: ICD-10-CM

## 2021-09-03 DIAGNOSIS — N17.9 ACUTE RENAL FAILURE SUPERIMPOSED ON CHRONIC KIDNEY DISEASE, UNSPECIFIED CKD STAGE, UNSPECIFIED ACUTE RENAL FAILURE TYPE: Primary | ICD-10-CM

## 2021-09-03 DIAGNOSIS — N18.9 ACUTE RENAL FAILURE SUPERIMPOSED ON CHRONIC KIDNEY DISEASE, UNSPECIFIED CKD STAGE, UNSPECIFIED ACUTE RENAL FAILURE TYPE: Primary | ICD-10-CM

## 2021-09-03 DIAGNOSIS — E11.22 TYPE 2 DIABETES MELLITUS WITH CHRONIC KIDNEY DISEASE, WITHOUT LONG-TERM CURRENT USE OF INSULIN, UNSPECIFIED CKD STAGE (H): Primary | ICD-10-CM

## 2021-09-03 DIAGNOSIS — N18.4 CKD (CHRONIC KIDNEY DISEASE) STAGE 4, GFR 15-29 ML/MIN (H): ICD-10-CM

## 2021-09-03 DIAGNOSIS — I10 ASYMPTOMATIC HYPERTENSION: ICD-10-CM

## 2021-09-03 DIAGNOSIS — R80.9 NEPHROTIC RANGE PROTEINURIA: ICD-10-CM

## 2021-09-03 DIAGNOSIS — E66.01 MORBID OBESITY (H): ICD-10-CM

## 2021-09-03 DIAGNOSIS — I16.0 HYPERTENSIVE URGENCY: ICD-10-CM

## 2021-09-03 DIAGNOSIS — I10 SEVERE HYPERTENSION: ICD-10-CM

## 2021-09-03 PROBLEM — N19 RENAL FAILURE, UNSPECIFIED CHRONICITY: Status: ACTIVE | Noted: 2020-02-09

## 2021-09-03 LAB
ALBUMIN SERPL-MCNC: 2.8 G/DL (ref 3.5–5)
ALP SERPL-CCNC: 65 U/L (ref 45–120)
ALT SERPL W P-5'-P-CCNC: <9 U/L (ref 0–45)
ANION GAP SERPL CALCULATED.3IONS-SCNC: 10 MMOL/L (ref 5–18)
AST SERPL W P-5'-P-CCNC: 11 U/L (ref 0–40)
BASOPHILS # BLD AUTO: 0.1 10E3/UL (ref 0–0.2)
BASOPHILS NFR BLD AUTO: 1 %
BILIRUB SERPL-MCNC: 0.2 MG/DL (ref 0–1)
BUN SERPL-MCNC: 38 MG/DL (ref 8–22)
CALCIUM SERPL-MCNC: 8.1 MG/DL (ref 8.5–10.5)
CHLORIDE BLD-SCNC: 110 MMOL/L (ref 98–107)
CO2 SERPL-SCNC: 20 MMOL/L (ref 22–31)
CREAT SERPL-MCNC: 4.46 MG/DL (ref 0.6–1.1)
EOSINOPHIL # BLD AUTO: 0.8 10E3/UL (ref 0–0.7)
EOSINOPHIL NFR BLD AUTO: 6 %
ERYTHROCYTE [DISTWIDTH] IN BLOOD BY AUTOMATED COUNT: 13.7 % (ref 10–15)
GFR SERPL CREATININE-BSD FRML MDRD: 12 ML/MIN/1.73M2
GLUCOSE BLD-MCNC: 85 MG/DL (ref 70–125)
GLUCOSE BLDC GLUCOMTR-MCNC: 93 MG/DL (ref 70–125)
HCG SERPL QL: NEGATIVE
HCT VFR BLD AUTO: 31.4 % (ref 35–47)
HGB BLD-MCNC: 10.2 G/DL (ref 11.7–15.7)
HOLD SPECIMEN: NORMAL
IMM GRANULOCYTES # BLD: 0.1 10E3/UL
IMM GRANULOCYTES NFR BLD: 1 %
LYMPHOCYTES # BLD AUTO: 1.9 10E3/UL (ref 0.8–5.3)
LYMPHOCYTES NFR BLD AUTO: 14 %
MAGNESIUM SERPL-MCNC: 1.4 MG/DL (ref 1.8–2.6)
MCH RBC QN AUTO: 26.8 PG (ref 26.5–33)
MCHC RBC AUTO-ENTMCNC: 32.5 G/DL (ref 31.5–36.5)
MCV RBC AUTO: 82 FL (ref 78–100)
MONOCYTES # BLD AUTO: 1 10E3/UL (ref 0–1.3)
MONOCYTES NFR BLD AUTO: 7 %
NEUTROPHILS # BLD AUTO: 10.2 10E3/UL (ref 1.6–8.3)
NEUTROPHILS NFR BLD AUTO: 71 %
NRBC # BLD AUTO: 0 10E3/UL
NRBC BLD AUTO-RTO: 0 /100
PLATELET # BLD AUTO: 337 10E3/UL (ref 150–450)
POTASSIUM BLD-SCNC: 4.3 MMOL/L (ref 3.5–5)
PROT SERPL-MCNC: 8.1 G/DL (ref 6–8)
RBC # BLD AUTO: 3.81 10E6/UL (ref 3.8–5.2)
SARS-COV-2 RNA RESP QL NAA+PROBE: NEGATIVE
SODIUM SERPL-SCNC: 140 MMOL/L (ref 136–145)
WBC # BLD AUTO: 14.1 10E3/UL (ref 4–11)

## 2021-09-03 PROCEDURE — 36415 COLL VENOUS BLD VENIPUNCTURE: CPT | Performed by: INTERNAL MEDICINE

## 2021-09-03 PROCEDURE — 82040 ASSAY OF SERUM ALBUMIN: CPT | Performed by: EMERGENCY MEDICINE

## 2021-09-03 PROCEDURE — 250N000011 HC RX IP 250 OP 636: Performed by: EMERGENCY MEDICINE

## 2021-09-03 PROCEDURE — 210N000001 HC R&B IMCU HEART CARE

## 2021-09-03 PROCEDURE — 99223 1ST HOSP IP/OBS HIGH 75: CPT | Performed by: INTERNAL MEDICINE

## 2021-09-03 PROCEDURE — 96374 THER/PROPH/DIAG INJ IV PUSH: CPT

## 2021-09-03 PROCEDURE — 96361 HYDRATE IV INFUSION ADD-ON: CPT

## 2021-09-03 PROCEDURE — 36415 COLL VENOUS BLD VENIPUNCTURE: CPT | Performed by: EMERGENCY MEDICINE

## 2021-09-03 PROCEDURE — 99215 OFFICE O/P EST HI 40 MIN: CPT | Performed by: FAMILY MEDICINE

## 2021-09-03 PROCEDURE — 93005 ELECTROCARDIOGRAM TRACING: CPT | Performed by: EMERGENCY MEDICINE

## 2021-09-03 PROCEDURE — 99285 EMERGENCY DEPT VISIT HI MDM: CPT | Mod: 25

## 2021-09-03 PROCEDURE — 258N000003 HC RX IP 258 OP 636: Performed by: EMERGENCY MEDICINE

## 2021-09-03 PROCEDURE — 96375 TX/PRO/DX INJ NEW DRUG ADDON: CPT

## 2021-09-03 PROCEDURE — 84703 CHORIONIC GONADOTROPIN ASSAY: CPT | Performed by: EMERGENCY MEDICINE

## 2021-09-03 PROCEDURE — 250N000011 HC RX IP 250 OP 636: Performed by: INTERNAL MEDICINE

## 2021-09-03 PROCEDURE — 250N000013 HC RX MED GY IP 250 OP 250 PS 637: Performed by: INTERNAL MEDICINE

## 2021-09-03 PROCEDURE — 87635 SARS-COV-2 COVID-19 AMP PRB: CPT | Performed by: EMERGENCY MEDICINE

## 2021-09-03 PROCEDURE — C9803 HOPD COVID-19 SPEC COLLECT: HCPCS

## 2021-09-03 PROCEDURE — 83735 ASSAY OF MAGNESIUM: CPT | Performed by: EMERGENCY MEDICINE

## 2021-09-03 PROCEDURE — 82384 ASSAY THREE CATECHOLAMINES: CPT | Performed by: INTERNAL MEDICINE

## 2021-09-03 PROCEDURE — 85025 COMPLETE CBC W/AUTO DIFF WBC: CPT | Performed by: EMERGENCY MEDICINE

## 2021-09-03 RX ORDER — LIDOCAINE 40 MG/G
CREAM TOPICAL
Status: DISCONTINUED | OUTPATIENT
Start: 2021-09-03 | End: 2021-09-06 | Stop reason: HOSPADM

## 2021-09-03 RX ORDER — AMLODIPINE BESYLATE 5 MG/1
10 TABLET ORAL DAILY
Status: DISCONTINUED | OUTPATIENT
Start: 2021-09-04 | End: 2021-09-06 | Stop reason: HOSPADM

## 2021-09-03 RX ORDER — GLIPIZIDE 5 MG/1
1 TABLET, FILM COATED, EXTENDED RELEASE ORAL DAILY
COMMUNITY
Start: 2021-08-16 | End: 2022-04-04

## 2021-09-03 RX ORDER — LABETALOL HYDROCHLORIDE 5 MG/ML
15 INJECTION, SOLUTION INTRAVENOUS ONCE
Status: COMPLETED | OUTPATIENT
Start: 2021-09-03 | End: 2021-09-03

## 2021-09-03 RX ORDER — MAGNESIUM SULFATE HEPTAHYDRATE 40 MG/ML
2 INJECTION, SOLUTION INTRAVENOUS ONCE
Status: COMPLETED | OUTPATIENT
Start: 2021-09-03 | End: 2021-09-03

## 2021-09-03 RX ORDER — LANCETS 30 GAUGE
EACH MISCELLANEOUS
COMMUNITY
Start: 2020-07-02 | End: 2021-09-03

## 2021-09-03 RX ORDER — HYDROCHLOROTHIAZIDE 12.5 MG/1
12.5 TABLET ORAL DAILY
Status: DISCONTINUED | OUTPATIENT
Start: 2021-09-03 | End: 2021-09-06

## 2021-09-03 RX ORDER — DEXTROSE MONOHYDRATE 25 G/50ML
25-50 INJECTION, SOLUTION INTRAVENOUS
Status: DISCONTINUED | OUTPATIENT
Start: 2021-09-03 | End: 2021-09-06 | Stop reason: HOSPADM

## 2021-09-03 RX ORDER — SODIUM CHLORIDE, SODIUM LACTATE, POTASSIUM CHLORIDE, CALCIUM CHLORIDE 600; 310; 30; 20 MG/100ML; MG/100ML; MG/100ML; MG/100ML
1000 INJECTION, SOLUTION INTRAVENOUS CONTINUOUS
Status: DISCONTINUED | OUTPATIENT
Start: 2021-09-03 | End: 2021-09-06 | Stop reason: HOSPADM

## 2021-09-03 RX ORDER — NICOTINE POLACRILEX 4 MG
15-30 LOZENGE BUCCAL
Status: DISCONTINUED | OUTPATIENT
Start: 2021-09-03 | End: 2021-09-06 | Stop reason: HOSPADM

## 2021-09-03 RX ORDER — HYDRALAZINE HYDROCHLORIDE 20 MG/ML
10 INJECTION INTRAMUSCULAR; INTRAVENOUS EVERY 6 HOURS PRN
Status: DISCONTINUED | OUTPATIENT
Start: 2021-09-03 | End: 2021-09-04

## 2021-09-03 RX ADMIN — HYDRALAZINE HYDROCHLORIDE 10 MG: 20 INJECTION INTRAMUSCULAR; INTRAVENOUS at 21:05

## 2021-09-03 RX ADMIN — LABETALOL HYDROCHLORIDE 15 MG: 5 INJECTION, SOLUTION INTRAVENOUS at 18:49

## 2021-09-03 RX ADMIN — Medication 12.5 MG: at 21:04

## 2021-09-03 RX ADMIN — MAGNESIUM SULFATE HEPTAHYDRATE 2 G: 40 INJECTION, SOLUTION INTRAVENOUS at 18:57

## 2021-09-03 RX ADMIN — SODIUM CHLORIDE, POTASSIUM CHLORIDE, SODIUM LACTATE AND CALCIUM CHLORIDE 500 ML: 600; 310; 30; 20 INJECTION, SOLUTION INTRAVENOUS at 19:03

## 2021-09-03 RX ADMIN — HYDROCHLOROTHIAZIDE 12.5 MG: 12.5 TABLET ORAL at 21:04

## 2021-09-03 RX ADMIN — SODIUM CHLORIDE, POTASSIUM CHLORIDE, SODIUM LACTATE AND CALCIUM CHLORIDE 1000 ML: 600; 310; 30; 20 INJECTION, SOLUTION INTRAVENOUS at 19:50

## 2021-09-03 ASSESSMENT — ENCOUNTER SYMPTOMS
NAUSEA: 0
FEVER: 0
DYSURIA: 0
CONFUSION: 0
SORE THROAT: 0
ABDOMINAL PAIN: 0
VOMITING: 0
DIZZINESS: 1
HEMATURIA: 0
CHILLS: 0
LIGHT-HEADEDNESS: 1
HEADACHES: 0
NUMBNESS: 0
DIARRHEA: 0
SHORTNESS OF BREATH: 0
JOINT SWELLING: 0
WEAKNESS: 0

## 2021-09-03 ASSESSMENT — ACTIVITIES OF DAILY LIVING (ADL)
HEARING_DIFFICULTY_OR_DEAF: NO
WALKING_OR_CLIMBING_STAIRS_DIFFICULTY: NO
DIFFICULTY_COMMUNICATING: NO
CONCENTRATING,_REMEMBERING_OR_MAKING_DECISIONS_DIFFICULTY: NO
DOING_ERRANDS_INDEPENDENTLY_DIFFICULTY: NO
FALL_HISTORY_WITHIN_LAST_SIX_MONTHS: NO
DEPENDENT_IADLS:: INDEPENDENT
DRESSING/BATHING_DIFFICULTY: NO
WEAR_GLASSES_OR_BLIND: YES
DIFFICULTY_EATING/SWALLOWING: NO
TOILETING_ISSUES: NO

## 2021-09-03 ASSESSMENT — MIFFLIN-ST. JEOR: SCORE: 1499.65

## 2021-09-03 NOTE — PHARMACY-ADMISSION MEDICATION HISTORY
Pharmacy Note - Admission Medication History    Pertinent Provider Information: patient was just prescribed aspirin today, but she hasn't started taking it yet.     ______________________________________________________________________    Prior To Admission (PTA) med list completed and updated in EMR.       PTA Med List   Medication Sig Note Last Dose     amLODIPine (NORVASC) 10 MG tablet Take 1 tablet (10 mg) by mouth daily  9/3/2021 at noon     aspirin (ASA) 81 MG EC tablet Take 1 tablet (81 mg) by mouth daily 9/3/2021: Just prescribed today, not started yet Not started yet     glipiZIDE (GLUCOTROL XL) 5 MG 24 hr tablet Take 1 tablet by mouth daily  9/3/2021 at noon     losartan (COZAAR) 100 MG tablet Take 1 tablet (100 mg) by mouth daily  9/3/2021 at noon       Information source(s): Patient and CareEverywhere/[facility]  Method of interview communication: in-person    Summary of Changes to PTA Med List  New: none  Discontinued: none  Changed: none    Patient was asked about OTC/herbal products specifically.  PTA med list reflects this.    In the past week, patient estimated taking medication this percent of the time:  greater than 90%.    Allergies were reviewed, assessed, and updated with the patient.      Patient does not use any multi-dose medications prior to admission.    The information provided in this note is only as accurate as the sources available at the time of the update(s).    Thank you for the opportunity to participate in the care of this patient.    Caty Benedict RPH  9/3/2021 6:54 PM

## 2021-09-03 NOTE — ED TRIAGE NOTES
Patient sent here from the clinic for further eval of hypertension.  Patient was seen at clinic for recheck of BP as she is taking Losartan and Amlodipine.  Denies any recent missed doses- does state that she was out of Losartan for a week but then has resumed this now.  Denies any headache or vision changes.  Denies any CP or SOB.

## 2021-09-03 NOTE — ED NOTES
Neuro: Oriented x4  IV/drain: none  VSS: b/p 250/110, A/P 110 and reg, 100% on RA  Resp: Clear    Cardio: WDL.   GI/: Voiding adequately ,   Pain/Discomfort: Pt denies pain, no current nausea or abd discomfort  Activity: Up independently   Nutrition:  has diabetetes  Skin: CDI .

## 2021-09-03 NOTE — ED PROVIDER NOTES
Emergency Department Encounter     Evaluation Date & Time:   9/3/2021  4:53 PM    CHIEF COMPLAINT:  Hypertension      Triage Note:Patient sent here from the clinic for further eval of hypertension.  Patient was seen at clinic for recheck of BP as she is taking Losartan and Amlodipine.  Denies any recent missed doses- does state that she was out of Losartan for a week but then has resumed this now.  Denies any headache or vision changes.  Denies any CP or SOB.         ED COURSE & MEDICAL DECISION MAKING:     ED Course as of Sep 03 2151   Fri Sep 03, 2021   1758 HR down to 70s without intervention.  BP improved slightly as well without intervention.      1825 Cr continues to worsen, now 4.46, previously 3.1 and 2.3 before that.  Will try and reduce BP further, get IV, IVF, likely renal imaging and hospitalize. Will discuss with pt.      1832 Pt aware of results, need for hospitalization and plan.  Agreeable.  She remains asymptomatic.      1855 Hospitalist requested cardiac tele bed.  Pt does not require ICU or continue BP lowering as she is entirely asymptomatic and ULYSSES is more subacute on chronic with continued urine output.          5:00 PM I met with the patient for the initial interview and physical examination. Discussed plan for treatment and workup in the ED. Pt has a long history of essential HTN, as well as CKD and NIDDM, seen in clinic today for routine follow up and BP elevated, so she was sent to the ED.  Pt only endorsing occasional lightheadedness, but no cp/sob, HA, visual changes, lateralizing weakness/numbness, change in urine output or any other signs of end organ dysfunction. She does report feeling anxious about being in ER, but states she always feels that way in healthcare settings. There is no indication for acute lowering per ACEP guidelines, which I discussed with pt. She is on losartan and amlodipine, missed several doses of losartan 2 weeks ago, but back taking.  Will get labs and EKG.   Anticipate discharge and referral to cardiology for further outpatient HTN management.   6:28 PM I discussed admission with the patient. Patient is agreeable. All questions answered fully.  6:53 PM I discussed the case with hospitalist, Dr Daniels, who accepts the patient.  Pt aware of results, plan and agreeable.      At the conclusion of the encounter I discussed the results of all the tests and the disposition. The questions were answered. The patient or family acknowledged understanding and was agreeable with the care plan.    PPE: Provider wore gloves, N95 mask, eye protection, surgical cap, and paper mask.    MEDICATIONS GIVEN IN THE EMERGENCY DEPARTMENT:  Medications   lactated ringers infusion (1,000 mLs Intravenous New Bag 9/3/21 1950)   amLODIPine (NORVASC) tablet 10 mg (has no administration in time range)   lidocaine 1 % 0.1-1 mL (has no administration in time range)   lidocaine (LMX4) cream (has no administration in time range)   sodium chloride (PF) 0.9% PF flush 3 mL (3 mLs Intracatheter Given 9/3/21 2108)   sodium chloride (PF) 0.9% PF flush 3 mL (has no administration in time range)   melatonin tablet 1 mg (has no administration in time range)   glucose gel 15-30 g (has no administration in time range)     Or   dextrose 50 % injection 25-50 mL (has no administration in time range)     Or   glucagon injection 1 mg (has no administration in time range)   hydrALAZINE (APRESOLINE) injection 10 mg (10 mg Intravenous Given 9/3/21 2105)   insulin aspart (NovoLOG) injection (RAPID ACTING) (has no administration in time range)   insulin aspart (NovoLOG) injection (RAPID ACTING) (0 Units Subcutaneous Not Given 9/3/21 2107)   metoprolol tartrate (LOPRESSOR) half-tab 12.5 mg (12.5 mg Oral Given 9/3/21 2104)   hydrochlorothiazide (HYDRODIURIL) tablet 12.5 mg (12.5 mg Oral Given 9/3/21 2104)   labetalol (NORMODYNE/TRANDATE) injection 15 mg (15 mg Intravenous Given 9/3/21 1849)   lactated ringers BOLUS 500 mL (0  mLs Intravenous Stopped 9/3/21 1950)   magnesium sulfate 2 g in water intermittent infusion (2 g Intravenous New Bag 9/3/21 1857)       NEW PRESCRIPTIONS STARTED AT TODAY'S ED VISIT:  Current Discharge Medication List          HPI   The history is provided by the patient.        Alysia Hernandez is a 36 year old female with a pertinent history of type 2 diabetes, hypertension, renal failure, stage 4 chronic kidney disease and smoking who presents to this ED via walk-in for evaluation of hypertension.     The patient reports that she was referred to the ED after a routine blood pressure follow up. The last time her blood pressure was measured was eight months ago, and it was around 146. Today her blood pressure reading was much higher. The patient states that she has been feeling fine, and has had intermittent mild dizzy and lightheaded spells recently. Pt currently asymptomatic. The patient reports that she took her medication this morning. She also notes that her blood pressure usually runs high, as does her heart rate. The patient denies chest pain, abdominal pain, vomiting, changes in urination, numbness or weakness of her extremities, headache, vision loss and any other symptoms or complaints at this time.    Of note, the patient rant out of her Losartan for a couple of days around two weeks ago. She has since had the prescription refilled.     REVIEW OF SYSTEMS:  Review of Systems   Constitutional: Negative for chills and fever.   HENT: Negative for sore throat.    Eyes: Negative for visual disturbance.   Respiratory: Negative for shortness of breath.    Cardiovascular: Negative for chest pain.   Gastrointestinal: Negative for abdominal pain, diarrhea, nausea and vomiting.   Endocrine: Negative for polyuria.   Genitourinary: Negative for dysuria and hematuria.        - urinary changes     Musculoskeletal: Negative for joint swelling.   Skin: Negative for rash.   Neurological: Positive for dizziness and  light-headedness. Negative for weakness, numbness and headaches.   Psychiatric/Behavioral: Negative for confusion.   All other systems reviewed and are negative.        Medical History     Past Medical History:   Diagnosis Date     Diabetes (H) 2/9/2020     Diabetes mellitus (H)      HTN (hypertension) 9/14/2015     Hypertensive retinopathy of both eyes 4/19/2013     Nephrotic range proteinuria 6/11/2020     Obesity (BMI 30.0-34.9) 2/9/2020     Renal failure, unspecified chronicity 2/9/2020     Smoking 5/4/2016       History reviewed. No pertinent surgical history.    Family History   Problem Relation Age of Onset     Diabetes Mother         50     Cerebrovascular Disease Mother      No Known Problems Father      No Known Problems Sister      No Known Problems Brother      Cerebrovascular Disease Paternal Aunt        Social History     Tobacco Use     Smoking status: Current Every Day Smoker     Packs/day: 0.25     Types: Cigarettes, Cigarettes, Cigarettes     Smokeless tobacco: Never Used     Tobacco comment: currently on E -cig. -- 11/8/13   Substance Use Topics     Alcohol use: No     Drug use: No       No current outpatient medications on file.      Physical Exam     Triage Vitals:  ED Triage Vitals   Enc Vitals Group      BP 09/03/21 1611 (!) 263/137      Pulse 09/03/21 1611 107      Resp 09/03/21 1611 19      Temp 09/03/21 1612 98.8  F (37.1  C)      Temp src 09/03/21 1611 Oral      SpO2 09/03/21 1611 100 %      Weight 09/03/21 1611 88 kg (194 lb)      Height --       Head Circumference --       Peak Flow --       Pain Score --       Pain Loc --       Pain Edu? --       Excl. in ? --         Vitals:  BP (!) 232/112 (BP Location: Left arm)   Pulse 91   Temp 98.5  F (36.9  C) (Oral)   Resp 18   Wt 88 kg (194 lb)   SpO2 96%   BMI 34.90 kg/m      PHYSICAL EXAM:   Physical Exam  Vitals and nursing note reviewed.   Constitutional:       Appearance: Normal appearance.      Comments: Anxious.    HENT:       Head: Normocephalic and atraumatic.      Nose: Nose normal.      Mouth/Throat:      Mouth: Mucous membranes are moist.   Eyes:      Pupils: Pupils are equal, round, and reactive to light.   Cardiovascular:      Rate and Rhythm: Regular rhythm. Tachycardia present.      Pulses: Normal pulses.           Radial pulses are 2+ on the right side and 2+ on the left side.        Dorsalis pedis pulses are 2+ on the right side and 2+ on the left side.   Pulmonary:      Effort: Pulmonary effort is normal. No respiratory distress.      Breath sounds: Normal breath sounds.   Abdominal:      Palpations: Abdomen is soft.      Tenderness: There is no abdominal tenderness.   Musculoskeletal:      Cervical back: Full passive range of motion without pain and neck supple.      Comments: No calf tenderness or swelling b/l   Skin:     General: Skin is warm.      Findings: No rash.   Neurological:      General: No focal deficit present.      Mental Status: She is alert. Mental status is at baseline.      Comments: Fluent speech, no acute lateralizing deficits   Psychiatric:         Mood and Affect: Mood normal.         Behavior: Behavior normal.         Results     LAB:  All pertinent labs reviewed and interpreted  Labs Ordered and Resulted from Time of ED Arrival Up to the Time of Departure from the ED   COMPREHENSIVE METABOLIC PANEL - Abnormal; Notable for the following components:       Result Value    Chloride 110 (*)     Carbon Dioxide (CO2) 20 (*)     Urea Nitrogen 38 (*)     Creatinine 4.46 (*)     Calcium 8.1 (*)     Protein Total 8.1 (*)     Albumin 2.8 (*)     GFR Estimate 12 (*)     All other components within normal limits   MAGNESIUM - Abnormal; Notable for the following components:    Magnesium 1.4 (*)     All other components within normal limits   CBC WITH PLATELETS AND DIFFERENTIAL - Abnormal; Notable for the following components:    WBC Count 14.1 (*)     Hemoglobin 10.2 (*)     Hematocrit 31.4 (*)     Absolute  Neutrophils 10.2 (*)     Absolute Eosinophils 0.8 (*)     Absolute Immature Granulocytes 0.1 (*)     All other components within normal limits   HCG QUALITATIVE PREGNANCY - Normal   CBC WITH PLATELETS & DIFFERENTIAL    Narrative:     The following orders were created for panel order CBC with platelets differential.  Procedure                               Abnormality         Status                     ---------                               -----------         ------                     CBC with platelets and d...[[id number]]  Abnormal            Final result                 Please view results for these tests on the individual orders.   PERIPHERAL IV CATHETER   CALL       RADIOLOGY:  US Renal Complete w Doppler Complete    (Results Pending)                ECG:  NSR, rate 91, normal intervals, no acute ischemia    I have independently reviewed and interpreted the EKG(s) documented above          FINAL IMPRESSION:    ICD-10-CM    1. Acute renal failure superimposed on chronic kidney disease, unspecified CKD stage, unspecified acute renal failure type (H)  N17.9     N18.9    2. Asymptomatic hypertension  I10        0 minutes of critical care time      I, Mercedes Joy, am serving as a scribe to document services personally performed by Dr. Jerad Martinez, based on my observations and the provider's statements to me. I, Jerad Martinez, DO attest that Mercedes Joy is acting in a scribe capacity, has observed my performance of the services and has documented them in accordance with my direction.      Jerad Martinez DO  Emergency Medicine  [facility] EMERGENCY DEPARTMENT  9/3/2021  4:57 PM          Jerad Martinez MD  09/03/21 2151

## 2021-09-03 NOTE — PROGRESS NOTES
"    Assessment & Plan     ICD-10-CM    1. Type 2 diabetes mellitus with chronic kidney disease, without long-term current use of insulin, unspecified CKD stage (H)  E11.22 Hemoglobin A1c     aspirin (ASA) 81 MG EC tablet     Adult Nephrology Referral   2. Essential hypertension  I10 Basic metabolic panel  (Ca, Cl, CO2, Creat, Gluc, K, Na, BUN)     CBC with platelets and differential   3. Nephrotic range proteinuria  R80.9 Adult Nephrology Referral   4. CKD (chronic kidney disease) stage 4, GFR 15-29 ml/min (H)  N18.4 Adult Nephrology Referral   5. Morbid obesity (H)  E66.01    6. Severe hypertension  I10      Complex decision making: Patient with a history of type 2 diabetes, hypertension, nephrotic range proteinuria and chronic kidney disease stage IV with morbid obesity presents today for routine follow-up.  She is noted to have severe hypertension.  Blood pressure checked every 15 minutes x3 and remained the same.  She is at risk of harm unless treated promptly.  Decision was made to send patient to emergency room.  Patient verbalizes understanding and plans to go with a sister.  She declines medical transport and this is reasonable as she is otherwise asymptomatic at this time.  Emphasized compliance after evaluation.  Patient reassures me that she will seek help.  Patient Instructions   I want you to go to ER    You have declined medical  transport    Your BP is very high today.    Despite repeat check it remains high    You may need medications and then be monitored until it is in reasonable range. This cannot be pursued in clinic setting           Tobacco Cessation:   reports that she has been smoking cigarettes, cigarettes, and cigarettes. She has been smoking about 0.25 packs per day. She has never used smokeless tobacco.      BMI:   Estimated body mass index is 34.97 kg/m  as calculated from the following:    Height as of 3/26/21: 1.588 m (5' 2.52\").    Weight as of this encounter: 88.2 kg (194 lb 6.4 oz). "       CONSULTATION/REFERRAL to emergency room  See Patient Instructions    Return in about 1 week (around 9/10/2021) for Follow up.    Toby Howard MD  [facility]    Subjective    Chief Complaint   Patient presents with     Diabetes     Hypertension       Alysia is a 36 year old who presents for the following health issues     HPI: Patient presents today for follow-up of her diabetes and hypertension.  I have not seen her since earlier in the year.  It appears that she has not followed with the her nephrologist.  She does inform me that she took her amlodipine this morning and her losartan.  Last month has been tough for her as a boyfriend passed away from COVID-[employer].  He was partially vaccinated with moderate vaccine.  She is now living alone.  She does have family and siblings but relationship is not very close.  Her closest friend lives in [place] with home she talks somebody.  I have known this patient for 2 years and there has been a lot of noncompliance in the past but recently she had started improving and had started regular follow-ups.  On asking leading questions, patient denies any chest pain.  She does admit to sometimes feeling racing heart.  She denies poor appetite, nausea or vomiting.  She had lost her job but now is again working.  She denies any atypical rashes.  She denies any respiratory symptoms.  No recent fevers.          Review of Systems   Constitutional, HEENT, cardiovascular, pulmonary, GI, , musculoskeletal, neuro, skin, endocrine and psych systems are negative, except as otherwise noted.      Objective    BP (!) 213/117   Pulse 105   Temp 98.4  F (36.9  C) (Oral)   Resp 16   Wt 88.2 kg (194 lb 6.4 oz)   SpO2 100%   BMI 34.97 kg/m    Body mass index is 34.97 kg/m .  Physical Exam   GENERAL: healthy, alert and no distress  NECK: no adenopathy, no asymmetry, masses, or scars and thyroid normal to palpation  RESP: lungs clear to auscultation - no  rales, rhonchi or wheezes  CV: regular rate and rhythm, normal S1 S2, no S3 or S4, no murmur, click or rub, no peripheral edema and peripheral pulses strong  ABDOMEN: soft, nontender, no hepatosplenomegaly, no masses and bowel sounds normal  MS: no gross musculoskeletal defects noted, no edema

## 2021-09-04 LAB
ALBUMIN MFR UR ELPH: 383.1 MG/DL
ALBUMIN UR-MCNC: 300 MG/DL
ANION GAP SERPL CALCULATED.3IONS-SCNC: 9 MMOL/L (ref 5–18)
APPEARANCE UR: CLEAR
ATRIAL RATE - MUSE: 91 BPM
BILIRUB UR QL STRIP: NEGATIVE
BUN SERPL-MCNC: 35 MG/DL (ref 8–22)
CALCIUM SERPL-MCNC: 7.9 MG/DL (ref 8.5–10.5)
CHLORIDE BLD-SCNC: 110 MMOL/L (ref 98–107)
CO2 SERPL-SCNC: 21 MMOL/L (ref 22–31)
COLOR UR AUTO: COLORLESS
CREAT SERPL-MCNC: 4.35 MG/DL (ref 0.6–1.1)
CREAT UR-MCNC: 41 MG/DL
DIASTOLIC BLOOD PRESSURE - MUSE: 103 MMHG
ERYTHROCYTE [DISTWIDTH] IN BLOOD BY AUTOMATED COUNT: 13.8 % (ref 10–15)
FERRITIN SERPL-MCNC: 170 NG/ML (ref 12–150)
GFR SERPL CREATININE-BSD FRML MDRD: 12 ML/MIN/1.73M2
GLUCOSE BLD-MCNC: 75 MG/DL (ref 70–125)
GLUCOSE BLDC GLUCOMTR-MCNC: 117 MG/DL (ref 70–125)
GLUCOSE BLDC GLUCOMTR-MCNC: 154 MG/DL (ref 70–125)
GLUCOSE BLDC GLUCOMTR-MCNC: 186 MG/DL (ref 70–125)
GLUCOSE BLDC GLUCOMTR-MCNC: 88 MG/DL (ref 70–125)
GLUCOSE UR STRIP-MCNC: 100 MG/DL
HCT VFR BLD AUTO: 28.1 % (ref 35–47)
HGB BLD-MCNC: 9.1 G/DL (ref 11.7–15.7)
HGB UR QL STRIP: ABNORMAL
INTERPRETATION ECG - MUSE: NORMAL
IRON SATN MFR SERPL: 17 % (ref 20–50)
IRON SERPL-MCNC: 38 UG/DL (ref 42–175)
KETONES UR STRIP-MCNC: NEGATIVE MG/DL
LACTATE SERPL-SCNC: 0.8 MMOL/L (ref 0.7–2)
LEUKOCYTE ESTERASE UR QL STRIP: NEGATIVE
MCH RBC QN AUTO: 26.9 PG (ref 26.5–33)
MCHC RBC AUTO-ENTMCNC: 32.4 G/DL (ref 31.5–36.5)
MCV RBC AUTO: 83 FL (ref 78–100)
NITRATE UR QL: NEGATIVE
P AXIS - MUSE: 67 DEGREES
PH UR STRIP: 7 [PH] (ref 5–7)
PHOSPHATE SERPL-MCNC: 4.9 MG/DL (ref 2.5–4.5)
PLATELET # BLD AUTO: 313 10E3/UL (ref 150–450)
POTASSIUM BLD-SCNC: 5.1 MMOL/L (ref 3.5–5)
PR INTERVAL - MUSE: 170 MS
PROT/CREAT 24H UR: 9.34 MG/MG CR
PTH-INTACT SERPL-MCNC: 314 PG/ML (ref 18–80)
QRS DURATION - MUSE: 84 MS
QT - MUSE: 366 MS
QTC - MUSE: 450 MS
R AXIS - MUSE: 62 DEGREES
RBC # BLD AUTO: 3.38 10E6/UL (ref 3.8–5.2)
RBC URINE: 1 /HPF
SODIUM SERPL-SCNC: 140 MMOL/L (ref 136–145)
SP GR UR STRIP: 1.01 (ref 1–1.03)
SQUAMOUS EPITHELIAL: 1 /HPF
SYSTOLIC BLOOD PRESSURE - MUSE: 230 MMHG
T AXIS - MUSE: 70 DEGREES
TIBC SERPL-MCNC: 218 UG/DL (ref 313–563)
TRANSFERRIN SERPL-MCNC: 174 MG/DL (ref 212–360)
TSH SERPL DL<=0.005 MIU/L-ACNC: 2.43 UIU/ML (ref 0.3–5)
UROBILINOGEN UR STRIP-MCNC: <2 MG/DL
VENTRICULAR RATE- MUSE: 91 BPM
WBC # BLD AUTO: 13.1 10E3/UL (ref 4–11)
WBC URINE: 5 /HPF

## 2021-09-04 PROCEDURE — 99233 SBSQ HOSP IP/OBS HIGH 50: CPT | Performed by: HOSPITALIST

## 2021-09-04 PROCEDURE — 250N000013 HC RX MED GY IP 250 OP 250 PS 637: Performed by: INTERNAL MEDICINE

## 2021-09-04 PROCEDURE — 82728 ASSAY OF FERRITIN: CPT | Performed by: INTERNAL MEDICINE

## 2021-09-04 PROCEDURE — 85027 COMPLETE CBC AUTOMATED: CPT | Performed by: INTERNAL MEDICINE

## 2021-09-04 PROCEDURE — 250N000011 HC RX IP 250 OP 636: Performed by: HOSPITALIST

## 2021-09-04 PROCEDURE — 210N000001 HC R&B IMCU HEART CARE

## 2021-09-04 PROCEDURE — 84466 ASSAY OF TRANSFERRIN: CPT | Performed by: INTERNAL MEDICINE

## 2021-09-04 PROCEDURE — 83970 ASSAY OF PARATHORMONE: CPT | Performed by: INTERNAL MEDICINE

## 2021-09-04 PROCEDURE — 36415 COLL VENOUS BLD VENIPUNCTURE: CPT | Performed by: HOSPITALIST

## 2021-09-04 PROCEDURE — 250N000012 HC RX MED GY IP 250 OP 636 PS 637: Performed by: INTERNAL MEDICINE

## 2021-09-04 PROCEDURE — 80048 BASIC METABOLIC PNL TOTAL CA: CPT | Performed by: INTERNAL MEDICINE

## 2021-09-04 PROCEDURE — 84156 ASSAY OF PROTEIN URINE: CPT | Performed by: INTERNAL MEDICINE

## 2021-09-04 PROCEDURE — 99255 IP/OBS CONSLTJ NEW/EST HI 80: CPT | Performed by: INTERNAL MEDICINE

## 2021-09-04 PROCEDURE — 83605 ASSAY OF LACTIC ACID: CPT | Performed by: HOSPITALIST

## 2021-09-04 PROCEDURE — 250N000011 HC RX IP 250 OP 636

## 2021-09-04 PROCEDURE — 36415 COLL VENOUS BLD VENIPUNCTURE: CPT | Performed by: INTERNAL MEDICINE

## 2021-09-04 PROCEDURE — 81001 URINALYSIS AUTO W/SCOPE: CPT | Performed by: INTERNAL MEDICINE

## 2021-09-04 PROCEDURE — 84443 ASSAY THYROID STIM HORMONE: CPT | Performed by: INTERNAL MEDICINE

## 2021-09-04 PROCEDURE — 84100 ASSAY OF PHOSPHORUS: CPT | Performed by: INTERNAL MEDICINE

## 2021-09-04 PROCEDURE — 250N000013 HC RX MED GY IP 250 OP 250 PS 637: Performed by: HOSPITALIST

## 2021-09-04 RX ORDER — HYDRALAZINE HYDROCHLORIDE 20 MG/ML
10 INJECTION INTRAMUSCULAR; INTRAVENOUS EVERY 6 HOURS PRN
Status: DISCONTINUED | OUTPATIENT
Start: 2021-09-04 | End: 2021-09-06 | Stop reason: HOSPADM

## 2021-09-04 RX ORDER — LABETALOL HYDROCHLORIDE 5 MG/ML
INJECTION, SOLUTION INTRAVENOUS
Status: COMPLETED
Start: 2021-09-04 | End: 2021-09-04

## 2021-09-04 RX ORDER — HYDRALAZINE HYDROCHLORIDE 25 MG/1
25 TABLET, FILM COATED ORAL 3 TIMES DAILY
Status: DISCONTINUED | OUTPATIENT
Start: 2021-09-04 | End: 2021-09-06

## 2021-09-04 RX ORDER — CARVEDILOL 3.12 MG/1
6.25 TABLET ORAL 2 TIMES DAILY WITH MEALS
Status: DISCONTINUED | OUTPATIENT
Start: 2021-09-04 | End: 2021-09-05

## 2021-09-04 RX ORDER — LABETALOL HYDROCHLORIDE 5 MG/ML
15 INJECTION, SOLUTION INTRAVENOUS ONCE
Status: COMPLETED | OUTPATIENT
Start: 2021-09-04 | End: 2021-09-04

## 2021-09-04 RX ORDER — SODIUM BICARBONATE 650 MG/1
650 TABLET ORAL 2 TIMES DAILY
Status: DISCONTINUED | OUTPATIENT
Start: 2021-09-04 | End: 2021-09-06 | Stop reason: HOSPADM

## 2021-09-04 RX ADMIN — HYDRALAZINE HYDROCHLORIDE 25 MG: 25 TABLET, FILM COATED ORAL at 16:52

## 2021-09-04 RX ADMIN — CARVEDILOL 6.25 MG: 3.12 TABLET, FILM COATED ORAL at 16:52

## 2021-09-04 RX ADMIN — SODIUM BICARBONATE 650 MG: 648 TABLET ORAL at 12:11

## 2021-09-04 RX ADMIN — SODIUM BICARBONATE 650 MG: 648 TABLET ORAL at 21:05

## 2021-09-04 RX ADMIN — AMLODIPINE BESYLATE 10 MG: 5 TABLET ORAL at 09:00

## 2021-09-04 RX ADMIN — HYDROCHLOROTHIAZIDE 12.5 MG: 12.5 TABLET ORAL at 09:00

## 2021-09-04 RX ADMIN — CARVEDILOL 6.25 MG: 3.12 TABLET, FILM COATED ORAL at 09:00

## 2021-09-04 RX ADMIN — HYDRALAZINE HYDROCHLORIDE 10 MG: 20 INJECTION INTRAMUSCULAR; INTRAVENOUS at 13:55

## 2021-09-04 RX ADMIN — LABETALOL HYDROCHLORIDE 15 MG: 5 INJECTION, SOLUTION INTRAVENOUS at 00:11

## 2021-09-04 RX ADMIN — INSULIN ASPART 2 UNITS: 100 INJECTION, SOLUTION INTRAVENOUS; SUBCUTANEOUS at 16:51

## 2021-09-04 RX ADMIN — HYDRALAZINE HYDROCHLORIDE 25 MG: 25 TABLET, FILM COATED ORAL at 21:05

## 2021-09-04 ASSESSMENT — MIFFLIN-ST. JEOR: SCORE: 1491.94

## 2021-09-04 NOTE — PLAN OF CARE
"  Problem: Adult Inpatient Plan of Care  Goal: Optimal Comfort and Wellbeing  Intervention: Provide Person-Centered Care  Recent Flowsheet Documentation  Taken 9/3/2021 2108 by Beverly Hodge RN  Trust Relationship/Rapport:    care explained    choices provided    emotional support provided    empathic listening provided    questions answered    questions encouraged    reassurance provided    thoughts/feelings acknowledged       Problem: Hypertension Acute  Goal: Blood Pressure Within Desired Range  Outcome: Declining      Patient easy going and compliant. Patient had IV in right arm, IV was infiltrated but I did not see because it was positional. When patient held up arm I could see the bump forming on it. When asked about this, she told me that she informed ED. Took IV out, SWAT placed new IV.     Ultrasound called to inform me that Order did not match with the reason and needed to be changed. Had HUC text page HOUSE about this, response is below.    \"Nurse called and stated that the order of renal ultrasound is not matching the indication.  Order to hold US order (ordered by ED doctor) for tonight and defer to hospitalist in am to clarify the order. \" Dr. Grace    Gave IV hydralazine in bad IV. Blood pressure had no changes. 1 hour later.    Pertinent Labs   Creatinine: 4.46  WBC: 14.1  Magnesium: 1.4, Iv bump given in ED.      Telemetry reads Normal Sinus Rhythm.        "

## 2021-09-04 NOTE — CONSULTS
NEPHROLOGY CONSULTATION    CC: Hypertensive urgency, renal failure.    REASON FOR CONSULTATION: We are asked to see pt by Dr. Walker.    HISTORY OF PRESENT ILLNESS:36 year old female with past medical history significant for type 2 diabetes complicated by diabetic retinopathy and diabetic nephropathy, stage IV chronic kidney disease with nephrotic range proteinuria, hypertension and obesity who presents to emergency room at [facility] on September 3 for further evaluation of uncontrolled blood pressure.  Patient states that her BP was 213/103 at PCP office.  Patient takes amlodipine 10 mg daily losartan 100 mg daily. Patient states that she takes her BP once per week. Her BP has been running in 140-160s/100s. She dose not follow low sodium diet.  On admission patient blood pressure was 222/98.  Laboratory work-up showed worsening renal function with serum creatinine of 4.4 mg/dL from previous of 3.2 mg/dL in June 2021.  Mild leukocytosis of 14,000, normocytic anemia with hemoglobin of 10.2 g/dL and normal platelet counts.  In regards of patient renal function.  Patient does have chronic kidney disease if it is going back to February 2020 when he serum creatinine was found to elevated 2.3 mg/dL from previous baseline of 0.5 mg/dL.  Follow-up labs in the January 2021 showed serum creatinine 3.2 mg/dL.  Patient was evaluated in our clinic in May 2020 by nurse mai Jacob.  Renal ultrasound and end of 2020 showed borderline echogenic renal parenchyma suggesting chronic renal disease.  No hydronephrosis.   It was felt that etiology of patient's renal failure related to diabetic nephropathy given history of poorly controlled diabetes and nephrotic range proteinuria as high as 10 g/g.  Patient's last visit in our clinic was in October 2020.  Patient has missed several follow-up appointments in 2021.  Patient reports being compliant to her medications.  Patient denies: NSAIDs use, fever, chills,  weight loss, dizziness, adenopathy, sore throat, rhinorrhea, cough, shortness of breath , chest pain, palpitations, orthopnea, nausea, vomiting, abdominal pain, changes in bowel habits, dysuria, urinary frequency, urgency, hematuria, rash.   Patient denies family h/o kidney disease.        REVIEW OF SYSTEMS:  ROS was completely reviewed and otherwise negative and non-contributory    Past Medical History:   Diagnosis Date     Diabetes (H) 2/9/2020     Diabetes mellitus (H)      HTN (hypertension) 9/14/2015     Hypertensive retinopathy of both eyes 4/19/2013     Nephrotic range proteinuria 6/11/2020     Obesity (BMI 30.0-34.9) 2/9/2020     Renal failure, unspecified chronicity 2/9/2020     Smoking 5/4/2016       Social History     Socioeconomic History     Marital status: Single     Spouse name: Not on file     Number of children: Not on file     Years of education: Not on file     Highest education level: Not on file   Occupational History     Not on file   Tobacco Use     Smoking status: Current Every Day Smoker     Packs/day: 0.25     Types: Cigarettes, Cigarettes, Cigarettes     Smokeless tobacco: Never Used     Tobacco comment: currently on E -cig. -- 11/8/13   Substance and Sexual Activity     Alcohol use: No     Drug use: No     Sexual activity: Not on file   Other Topics Concern     Not on file   Social History Narrative    Lives alone at dad's home. 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  Partnered for 3 years. No children  Has has step kids ( 2) 18 and 8.    Toby Howard MD  2/7/2020         Social Determinants of Health     Financial Resource Strain:      Difficulty of Paying Living Expenses:    Food Insecurity:      Worried About Running Out of Food in the Last Year:      Ran Out of Food in the Last Year:    Transportation Needs:      Lack of Transportation (Medical):      Lack of Transportation (Non-Medical):    Physical Activity:      Days of Exercise per Week:      Minutes of Exercise  per Session:    Stress:      Feeling of Stress :    Social Connections:      Frequency of Communication with Friends and Family:      Frequency of Social Gatherings with Friends and Family:      Attends Hinduism Services:      Active Member of Clubs or Organizations:      Attends Club or Organization Meetings:      Marital Status:    Intimate Partner Violence:      Fear of Current or Ex-Partner:      Emotionally Abused:      Physically Abused:      Sexually Abused:        Family History   Problem Relation Age of Onset     Diabetes Mother         50     Cerebrovascular Disease Mother      No Known Problems Father      No Known Problems Sister      No Known Problems Brother      Cerebrovascular Disease Paternal Aunt        No Known Allergies    MEDICATIONS:    amLODIPine  10 mg Oral Daily     hydrochlorothiazide  12.5 mg Oral Daily     insulin aspart  1-10 Units Subcutaneous TID AC     insulin aspart  1-7 Units Subcutaneous At Bedtime     metoprolol tartrate  12.5 mg Oral BID     sodium chloride (PF)  3 mL Intracatheter Q8H         PHYSICAL EXAM    BP (!) 163/81 (BP Location: Left arm)   Pulse 93   Temp 98  F (36.7  C) (Oral)   Resp 18   Ht 1.524 m (5')   Wt 87.5 kg (193 lb)   SpO2 97%   BMI 37.69 kg/m        Intake/Output Summary (Last 24 hours) at 9/4/2021 0726  Last data filed at 9/4/2021 0639  Gross per 24 hour   Intake 350 ml   Output 400 ml   Net -50 ml       Alert/oriented x 3; awake and NAD  HEENT NC/AT; perrla; OP clear without lesions; mmm  Neck supple without LAD, TM  CV; RRR without rub or murmur  Lung: clear and equal; no extra sounds  Ab: soft and NT; not distended; normal bs  Ext: no edema and well perfused  Skin; no rash  Neuro; grossly intact, no asterixis    LABORATORIES    Recent Labs   Lab 09/04/21  0544 09/03/21  1740   WBC 13.1* 14.1*   HGB 9.1* 10.2*   HCT 28.1* 31.4*    337     Recent Labs   Lab 09/04/21  0544 09/03/21  1740    140   CO2 21* 20*   BUN 35* 38*   ALKPHOS  --   65   ALT  --  <9   AST  --  11     No results for input(s): INR, PTT in the last 168 hours.    Invalid input(s): APTT  Invalid input(s): FERRITIN  No results for input(s): IRON in the last 168 hours.    Invalid input(s): TIBC    I reviewed all labs    ASSESSMENT/PLAN:  36 year old female with past medical history significant for poorly controlled diabetes mellitus, hypertension and stage IV chronic kidney disease who presented for further management of uncontrolled hypertension.    End-stage renal disease-likely secondary to diabetic nephropathy given prolonged history of poorly controlled diabetes and nephrotic range proteinuria.  Secondary FSGS is on differential as well  given patient's obesity.  Patient does have history of chronic kidney at least going back to 2020.  Patient's renal function progressively declined in the last 2 years.  Patient serum creatinine was 3.2 mg/dL in June 2021.  Patient presented serum creatinine of 4.4 milligrams per deciliter.  UACR was 5 g/g in 01/2021.  Work-up for monoclonal gammaopathy in the past was negative.  TMA is unlikely given normal platelet count.  Check UA, UPCR  No indications for renal biopsy given chronic changes seen renal ultrasound in May 2020.  No indication for initiation of dialysis given no sign or symptoms of uremia.  I discussed in length with the patient different forms of dialysis including PD and incenter HD.  Hold prior to admission losartan  Monitor renal function and urine output daily while inpatient   Patient was advised to follow up in our clinic once per month for close monitoring  Avoid nephrotoxins  Renal dose medications    Hyperkalemia-mild.  Serum.  Likely multifactorial secondary to advanced kidney disease, metabolic acidosis, dietary discretions and ARB.  -I discussed with the patient the importance of Renal diet. I will consult RD to provide education  -hold  Losartan  -trend    Metabolic acidosis-mild.  Serum bicarbonate is 21.  Likely  secondary to advanced chronic kidney disease.  Hold bicarbonate 22-27 patient's chronic disease.  Bicarbonate 650 mg twice daily.    Hypertensive urgency-prior to admission patient was taken amlodipine 10 mg daily and losartan 100 mg daily.  Losartan held on admission given for the progression of chronic kidney disease.  Amlodipine was continued.  Patient was started on hydrochlorothiazide 12.5 mg daily.  Patient blood pressure improved to 173/80 this am  -Recommend to start carvedilol 6.25 mg twice daily  -Continue current dose of amlodipine  -Monitor blood pressure closely  -Low-sodium diet    Normocytic anemia-likely secondary to advanced chronic kidney disease.  Patient presented with hemoglobin of 10.2.  Today hemoglobin is trended down to 9.1 g/dL.  No signs of active bleeding.  Check iron studies and TSH    Blood mineral disease of CKD  -Serum calcium corrected for hypoalbuminemia is within normal limit around 9.2  -Check serum phosphorus and PTH    Leukocytosis-mild.  Patient is afebrile.  No signs of infection.    Type 2 diabetes-diagnosed in 2012.  Poorly controlled historically.  Most recent hemoglobin A1c percent.  Complicated by diabetic retinopathy.  Prior to admission patient was taking glipizide 5 mg daily and insulin.  Will defer management to hospitalist service.     Discussed with .    Thanks for consultation.  Will follow.      Nataliia Leija MD  [facility]  [address] [place] [address], [address]  [place] [address]  Phone# [phone]  Fax# [phone]

## 2021-09-04 NOTE — PLAN OF CARE
Problem: Hypertension Acute  Goal: Blood Pressure Within Desired Range  Outcome: Improving     Pt's BP improved after one time dose of labetalol 15mg given.   Latest /81.    Creatinine 4.35 and Potassium level 5.1 this AM. Nephrology to consult today.

## 2021-09-04 NOTE — PROGRESS NOTES
[facility] Hospitalist Progress Note  [facility]  Summary:    36F with HTN, CKD4, DM2, obesity who was referred to the ER for hypertensive urgency 222/98.     Assessment/Plan    #ULYSSES vs. Progressive CKD4 - likely DM&HTN nephropathy.  nephrology consult  #hypertensive urgency - coreg, norvasc, hydrochlorothiazide.  Hydralazine PRN. Hold losartan for now  #DM2 - hold glipizide for now, SSI    Checklist:  Code Status: Full Code    Diet: Consistent Carb 60 grams CHO per Meal Diet    Gong Catheter: Not present  Central Lines: None  DVT px:  Pneumatic Compression Devices and ambulate until BP better controlled      Expected discharge: In 1-2 days, to prior living arrangement, once BP better controlled    Overnight Events/Subjective/Notable results:  Feeling better.  No dizziness, CP, SOB, HA.    4 point ROS otherwise negative    Objective    Vital signs in last 24 hours  Temp:  [98  F (36.7  C)-98.8  F (37.1  C)] 98  F (36.7  C)  Pulse:  [] 93  Resp:  [12-21] 18  BP: (163-263)/() 163/81  SpO2:  [96 %-100 %] 97 % O2 Device: None (Room air)    Weight:   193 lbs 0 oz    Intake/Output last 3 shifts  I/O last 3 completed shifts:  In: 350 [I.V.:350]  Out: 400 [Urine:400]  Body mass index is 37.69 kg/m .    Physical Exam  General:  Alert, cooperative, no distress,  Appears stated age  Neurologic:  oriented, facialsymmetry preserved, fluent speech.   Psych: calm, mood and affect appropriate to situation  HEENT:  Anicteric, MMM, unremarkable dentition  CV: RRR no MRG, normal S1 and S2, no edema  Lungs: CTAB.  Easyrespirations  Abd: soft, NT, normoactive BS  Skin: no rashes noted on exposed skin. Color and turgor normal  Central Lines and Tubes: None (no gong, CVC, feeding tubes)      I have reviewed all labs, medications, imaging studies in the last 24 hours.  Pertinent findings&changes discussed above.    Data     Discussed with: Dr. Liss Jules MD  Internal Medicine  Hospitalist  9/4/2021  7:36 AM

## 2021-09-04 NOTE — PLAN OF CARE
Problem: Hypertension Acute  Goal: Blood Pressure Within Desired Range  Outcome: No Change   GC=122/84 Am meds given with correg added. YO=075/99 Md notifed. PRN Hydralazine given. Pt asymptomatic.

## 2021-09-04 NOTE — PROGRESS NOTES
Nurse called and stated that the order of renal ultrasound is not matching the indication.  Order to hold US order (ordered by ED doctor) for tonight and defer to hospitalist in am to clarify the order.

## 2021-09-04 NOTE — SIGNIFICANT EVENT
Pt's /101, asymptomatic.   MD Grace paged and received order for one time dose Labetalol 15mg IV, which was given. Will continue to monitor.

## 2021-09-04 NOTE — H&P
ADMISSION HISTORY & PHYSICAL    CODE STATUS:  Full code  No Ref-Primary, Physician, None    Patient YOB: 1985  Admit date: 9/3/2021  Date of Service: 9/3/2021    ASSESSMENT AND PLAN:  36 year old female presenting with:    Hypertensive urgency:  - Patient apparently was on amlodipine and losartan at home. Ran out of losartan for a wk, but then started taking them now.  - Hold losartan due to worsening renal failure  - Cont amlodipine, add BB. Will also add diuretics. Use IV hydralazine prn  - Renal ultrasound ordered by ED physician. Check labs for pheochromocytoma as well    ULYSSES on CKD-IV:  - Her creatinine used to be in the rage of 2.3 to 3.1 in the recent past  - Creatinine of 4.4 noted now  - Etiology for CKD is likely hypertension and diabetes.   - Nephrology consult    DM-II:  - Hold PO medications  - Start sliding scale insulin    Obesity  - Body mass index is 34.9 kg/m .  - Advised to lose some body weight    Disposition:  -Anticipated Length of Stay in midnights and medical necessity (including a midnight in the Emergency Department after triage if applicable): >2      CHIEF COMPLAINT:  Elevated BP    HISTORY OF PRESENTING ILLNESS:  Patient is a 36 year old female with PMH significant for hypertension, DM-II, CKD-IV and obesity who was sent to our ED by her PCP for evaluation of uncontrolled BP.    The patient reports that she was referred to the ED after a routine blood pressure follow up. The last time her blood pressure was measured was eight months ago, and it was around 146. Today her blood pressure reading was much higher. The patient states that she has been feeling fine, and has had intermittent dizzy and lightheaded spells recently. The patient reports that she took her medication this morning. She also notes that her blood pressure usually runs high, as does her heart rate. The patient denies chest pain, abdominal pain, vomiting, changes in urination, numbness or weakness of her  extremities, headache, vision loss and any other symptoms or complaints at this time. Of note, the patient ran out of her Losartan for a couple of days around two weeks ago. She has since had the prescription refilled.         PMH/PSH:  Patient Active Problem List   Diagnosis     Other specified visual disturbances     Tinnitus     Diabetes type 2, uncontrolled (H)     Morbid obesity (H)     CKD (chronic kidney disease) stage 4, GFR 15-29 ml/min (H)     Ganglion     HTN (hypertension)     Hypertensive retinopathy of both eyes     Microalbuminuria     Renal failure, unspecified chronicity     Smoking     Acute on chronic renal failure (H)     Acute renal failure superimposed on chronic kidney disease, unspecified CKD stage, unspecified acute renal failure type (H)       Past Medical History:   Diagnosis Date     Diabetes (H) 2/9/2020     Diabetes mellitus (H)      HTN (hypertension) 9/14/2015     Hypertensive retinopathy of both eyes 4/19/2013     Nephrotic range proteinuria 6/11/2020     Obesity (BMI 30.0-34.9) 2/9/2020     Renal failure, unspecified chronicity 2/9/2020     Smoking 5/4/2016        History reviewed. No pertinent surgical history.       ALLERGIES:  No Known Allergies    MEDICATIONS:  Reviewed.  Current Facility-Administered Medications   Medication     lactated ringers BOLUS 500 mL     lactated ringers infusion     Current Outpatient Medications   Medication     amLODIPine (NORVASC) 10 MG tablet     aspirin (ASA) 81 MG EC tablet     glipiZIDE (GLUCOTROL XL) 5 MG 24 hr tablet     losartan (COZAAR) 100 MG tablet     Blood Pressure Monitor KIT     Glucose Blood (MARIA DEL CARMEN CONTOUR TEST) strip     insulin pen needle (B-D U/F) 31G X 5 MM miscellaneous       Current Facility-Administered Medications:      lactated ringers BOLUS 500 mL, 500 mL, Intravenous, Once, Jerad Martinez MD     lactated ringers infusion, 1,000 mL, Intravenous, Continuous, Jerad Martinez MD    Current Outpatient Medications:       amLODIPine (NORVASC) 10 MG tablet, Take 1 tablet (10 mg) by mouth daily, Disp: 90 tablet, Rfl: 3     aspirin (ASA) 81 MG EC tablet, Take 1 tablet (81 mg) by mouth daily, Disp: 90 tablet, Rfl: 3     glipiZIDE (GLUCOTROL XL) 5 MG 24 hr tablet, Take 1 tablet by mouth daily, Disp: , Rfl:      losartan (COZAAR) 100 MG tablet, Take 1 tablet (100 mg) by mouth daily, Disp: 90 tablet, Rfl: 1     Blood Pressure Monitor KIT, Check blood pressure daily and record, Disp: 1 kit, Rfl: 0     Glucose Blood (MARIA DEL CARMEN CONTOUR TEST) strip, Use to test blood sugars 2 times daily or as directed., Disp: 100 strip, Rfl: prn     insulin pen needle (B-D U/F) 31G X 5 MM miscellaneous, Use 1 pen needles a week, Disp: 50 each, Rfl: 1   Scheduled Meds:    lactated ringers  500 mL Intravenous Once     Continuous Infusions:    lactated ringers       PRN Meds:.    SOCIAL HISTORY:  Social History     Socioeconomic History     Marital status: Single     Spouse name: Not on file     Number of children: Not on file     Years of education: Not on file     Highest education level: Not on file   Occupational History     Not on file   Tobacco Use     Smoking status: Current Every Day Smoker     Packs/day: 0.25     Types: Cigarettes, Cigarettes, Cigarettes     Smokeless tobacco: Never Used     Tobacco comment: currently on E -cig. -- 11/8/13   Substance and Sexual Activity     Alcohol use: No     Drug use: No     Sexual activity: Not on file   Other Topics Concern     Not on file   Social History Narrative    Lives alone at dad's home. 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  Partnered for 3 years. No children  Has has step kids ( 2) 18 and 8.    Toby Howard MD  2/7/2020         Social Determinants of Health     Financial Resource Strain:      Difficulty of Paying Living Expenses:    Food Insecurity:      Worried About Running Out of Food in the Last Year:      Ran Out of Food in the Last Year:    Transportation Needs:      Lack of  Transportation (Medical):      Lack of Transportation (Non-Medical):    Physical Activity:      Days of Exercise per Week:      Minutes of Exercise per Session:    Stress:      Feeling of Stress :    Social Connections:      Frequency of Communication with Friends and Family:      Frequency of Social Gatherings with Friends and Family:      Attends Anabaptist Services:      Active Member of Clubs or Organizations:      Attends Club or Organization Meetings:      Marital Status:    Intimate Partner Violence:      Fear of Current or Ex-Partner:      Emotionally Abused:      Physically Abused:      Sexually Abused:        FAMILY HISTORY:  Family History   Problem Relation Age of Onset     Diabetes Mother         50     Cerebrovascular Disease Mother      No Known Problems Father      No Known Problems Sister      No Known Problems Brother      Cerebrovascular Disease Paternal Aunt         ROS:  12 point review of systems reviewed and is negative except for what has already been mentioned in HPI.       PHYSICAL EXAM:  GENRL:  Not in acute distress   BP (!) 206/100   Pulse 88   Temp 98.4  F (36.9  C) (Oral)   Resp 13   Wt 88 kg (194 lb)   SpO2 100%   BMI 34.90 kg/m    No intake/output data recorded.  No intake/output data recorded.  HEENT: NC/AT      Eyes-  Pupil round and reactive to light bilaterally       Neck- supple, no JVP elevation,       Sclera- anicteric      Oropharynx- moist and pink  CHEST: Clear to auscultation bilateral anteriorly, no ronchi or wheezing  HEART: S1S2 normal, regular.   ABDMN: Soft. Non-tender, non-distended.  No guarding or rigidity. Bowel sounds- active  EXTRM: No pedal edema   INTGM: No skin rash, no cyanosis or clubbing  MUSCULOSKELETAL: no joint tenderness or swelling on upper and lower extremities  NEURO: Alert and awake. Cranial nerves II-XII grossly intact. No focal neurological deficit.  PSYCH:     GENITOURINARY:       DIAGNOSTIC DATA:    Recent Labs   Lab 09/03/21  1740   WBC  14.1*   HGB 10.2*   HCT 31.4*          Recent Labs   Lab 09/03/21  1740      CO2 20*   BUN 38*       No results for input(s): INR in the last 168 hours.    Recent Labs   Lab 09/03/21  1740      CO2 20*   BUN 38*   ALBUMIN 2.8*   ALKPHOS 65   ALT <9   AST 11       [unfilled]    No results found.    Patient's new lab studies reviewed personally.  Patient's new radiology reports reviewed personally.  I personally viewed and personally interpreted patient's EKG: NSR    Note created using dragon voice recognition software.  Errors in spelling or words which seems out of context are unintentional.  Sounds alike errors may have escaped editing.     09/03/2021  Ashish Daniels MD  HOSPITALIST, [facility]  PAGER NO. [phone]

## 2021-09-04 NOTE — CONSULTS
Care Management Initial Consult    General Information  Assessment completed with: Patient, pt  Type of CM/SW Visit: Initial Assessment    Primary Care Provider verified and updated as needed: Yes   Readmission within the last 30 days: no previous admission in last 30 days   Return Category: Progression of disease  Reason for Consult: discharge planning  Advance Care Planning: Advance Care Planning Reviewed: no concerns identified          Communication Assessment  Patient's communication style: spoken language (English or Bilingual)    Hearing Difficulty or Deaf: no   Wear Glasses or Blind: no    Cognitive  Cognitive/Neuro/Behavioral: WDL                      Living Environment:   People in home: alone     Current living Arrangements:        Able to return to prior arrangements: yes       Family/Social Support:  Care provided by: self  Provides care for: no one                Description of Support System:           Current Resources:   Patient receiving home care services: No     Community Resources: None  Equipment currently used at home: none  Supplies currently used at home: None    Employment/Financial:  Employment Status:          Financial Concerns:             Lifestyle & Psychosocial Needs:  Social Determinants of Health     Tobacco Use: High Risk     Smoking Tobacco Use: Current Every Day Smoker     Smokeless Tobacco Use: Never Used   Alcohol Use:      Frequency of Alcohol Consumption:      Average Number of Drinks:      Frequency of Binge Drinking:    Financial Resource Strain:      Difficulty of Paying Living Expenses:    Food Insecurity:      Worried About Running Out of Food in the Last Year:      Ran Out of Food in the Last Year:    Transportation Needs:      Lack of Transportation (Medical):      Lack of Transportation (Non-Medical):    Physical Activity:      Days of Exercise per Week:      Minutes of Exercise per Session:    Stress:      Feeling of Stress :    Social Connections:      Frequency of  Communication with Friends and Family:      Frequency of Social Gatherings with Friends and Family:      Attends Holiness Services:      Active Member of Clubs or Organizations:      Attends Club or Organization Meetings:      Marital Status:    Intimate Partner Violence:      Fear of Current or Ex-Partner:      Emotionally Abused:      Physically Abused:      Sexually Abused:    Depression: Not at risk     PHQ-2 Score: 0   Housing Stability:      Unable to Pay for Housing in the Last Year:      Number of Places Lived in the Last Year:      Unstable Housing in the Last Year:        Functional Status:  Prior to admission patient needed assistance:   Dependent ADLs:: Independent  Dependent IADLs:: Independent  Assesssment of Functional Status: At functional baseline    Mental Health Status:  Mental Health Status: No Current Concerns       Chemical Dependency Status:                Values/Beliefs:  Spiritual, Cultural Beliefs, Holiness Practices, Values that affect care:                 Additional Information:  AMRIK assessed, no svcs, independent and no needs identified.      Valarie Hernandez RN

## 2021-09-04 NOTE — PROGRESS NOTES
Care Management Follow Up    Length of Stay (days): 1    Expected Discharge Date:  9/6/21 or 9/7/21       Concerns to be Addressed:   Alteration in renal function: IV fluids at 125 ml/hour (nephrology following).   Patient plan of care discussed at interdisciplinary rounds: Yes    Anticipated Discharge Disposition:  Home.     Anticipated Discharge Services:  None.  Anticipated Discharge DME:  None.    Patient/family educated on Medicare website which has current facility and service quality ratings:  NA  Education Provided on the Discharge Plan:  Per team.  Patient/Family in Agreement with the Plan:  Yes    Referrals Placed by CM/SW:  None  Private pay costs discussed: Not applicable at this time.    Additional Information:  Patient lives alone and is independent with activities of daily living at baseline.  No care management needs identified at this time but CM will continue to monitor progression of care, review team recommendations and provide discharge planning assist as needed.      Mercedes Black RN

## 2021-09-05 LAB
ANION GAP SERPL CALCULATED.3IONS-SCNC: 8 MMOL/L (ref 5–18)
BUN SERPL-MCNC: 46 MG/DL (ref 8–22)
CALCIUM SERPL-MCNC: 8 MG/DL (ref 8.5–10.5)
CHLORIDE BLD-SCNC: 108 MMOL/L (ref 98–107)
CO2 SERPL-SCNC: 21 MMOL/L (ref 22–31)
CREAT SERPL-MCNC: 4.85 MG/DL (ref 0.6–1.1)
ERYTHROCYTE [DISTWIDTH] IN BLOOD BY AUTOMATED COUNT: 13.7 % (ref 10–15)
GFR SERPL CREATININE-BSD FRML MDRD: 11 ML/MIN/1.73M2
GLUCOSE BLD-MCNC: 151 MG/DL (ref 70–125)
GLUCOSE BLDC GLUCOMTR-MCNC: 118 MG/DL (ref 70–125)
GLUCOSE BLDC GLUCOMTR-MCNC: 129 MG/DL (ref 70–125)
GLUCOSE BLDC GLUCOMTR-MCNC: 143 MG/DL (ref 70–125)
GLUCOSE BLDC GLUCOMTR-MCNC: 173 MG/DL (ref 70–125)
HCT VFR BLD AUTO: 29.5 % (ref 35–47)
HGB BLD-MCNC: 9.3 G/DL (ref 11.7–15.7)
MCH RBC QN AUTO: 26.6 PG (ref 26.5–33)
MCHC RBC AUTO-ENTMCNC: 31.5 G/DL (ref 31.5–36.5)
MCV RBC AUTO: 85 FL (ref 78–100)
PLATELET # BLD AUTO: 306 10E3/UL (ref 150–450)
POTASSIUM BLD-SCNC: 5 MMOL/L (ref 3.5–5)
RBC # BLD AUTO: 3.49 10E6/UL (ref 3.8–5.2)
SODIUM SERPL-SCNC: 137 MMOL/L (ref 136–145)
WBC # BLD AUTO: 11.9 10E3/UL (ref 4–11)

## 2021-09-05 PROCEDURE — 80048 BASIC METABOLIC PNL TOTAL CA: CPT | Performed by: HOSPITALIST

## 2021-09-05 PROCEDURE — 210N000001 HC R&B IMCU HEART CARE

## 2021-09-05 PROCEDURE — 250N000011 HC RX IP 250 OP 636: Performed by: HOSPITALIST

## 2021-09-05 PROCEDURE — 250N000013 HC RX MED GY IP 250 OP 250 PS 637: Performed by: INTERNAL MEDICINE

## 2021-09-05 PROCEDURE — 99233 SBSQ HOSP IP/OBS HIGH 50: CPT | Performed by: HOSPITALIST

## 2021-09-05 PROCEDURE — 36415 COLL VENOUS BLD VENIPUNCTURE: CPT | Performed by: HOSPITALIST

## 2021-09-05 PROCEDURE — 85027 COMPLETE CBC AUTOMATED: CPT | Performed by: HOSPITALIST

## 2021-09-05 PROCEDURE — 250N000013 HC RX MED GY IP 250 OP 250 PS 637: Performed by: HOSPITALIST

## 2021-09-05 PROCEDURE — 99207 PR CDG-MDM COMPONENT: MEETS MODERATE - UP CODED: CPT | Performed by: HOSPITALIST

## 2021-09-05 PROCEDURE — 99233 SBSQ HOSP IP/OBS HIGH 50: CPT | Performed by: INTERNAL MEDICINE

## 2021-09-05 RX ORDER — FERROUS SULFATE 325(65) MG
325 TABLET ORAL DAILY
Status: DISCONTINUED | OUTPATIENT
Start: 2021-09-05 | End: 2021-09-06 | Stop reason: HOSPADM

## 2021-09-05 RX ORDER — CARVEDILOL 12.5 MG/1
12.5 TABLET ORAL 2 TIMES DAILY WITH MEALS
Status: DISCONTINUED | OUTPATIENT
Start: 2021-09-05 | End: 2021-09-06

## 2021-09-05 RX ORDER — CARVEDILOL 3.12 MG/1
6.25 TABLET ORAL ONCE
Status: COMPLETED | OUTPATIENT
Start: 2021-09-05 | End: 2021-09-05

## 2021-09-05 RX ORDER — CALCITRIOL 0.25 UG/1
0.25 CAPSULE, LIQUID FILLED ORAL
Status: DISCONTINUED | OUTPATIENT
Start: 2021-09-06 | End: 2021-09-06 | Stop reason: HOSPADM

## 2021-09-05 RX ORDER — ASPIRIN 81 MG/1
81 TABLET ORAL DAILY
Status: DISCONTINUED | OUTPATIENT
Start: 2021-09-05 | End: 2021-09-06 | Stop reason: HOSPADM

## 2021-09-05 RX ADMIN — CARVEDILOL 6.25 MG: 3.12 TABLET, FILM COATED ORAL at 12:30

## 2021-09-05 RX ADMIN — AMLODIPINE BESYLATE 10 MG: 5 TABLET ORAL at 09:00

## 2021-09-05 RX ADMIN — Medication 250 MG: at 14:00

## 2021-09-05 RX ADMIN — FERROUS SULFATE TAB 325 MG (65 MG ELEMENTAL FE) 325 MG: 325 (65 FE) TAB at 12:46

## 2021-09-05 RX ADMIN — HYDROCHLOROTHIAZIDE 12.5 MG: 12.5 TABLET ORAL at 09:01

## 2021-09-05 RX ADMIN — GLIPIZIDE 2.5 MG: 5 TABLET ORAL at 17:11

## 2021-09-05 RX ADMIN — HYDRALAZINE HYDROCHLORIDE 25 MG: 25 TABLET, FILM COATED ORAL at 21:09

## 2021-09-05 RX ADMIN — ASPIRIN 81 MG: 81 TABLET, COATED ORAL at 09:14

## 2021-09-05 RX ADMIN — SODIUM BICARBONATE 650 MG: 648 TABLET ORAL at 09:01

## 2021-09-05 RX ADMIN — CARVEDILOL 12.5 MG: 12.5 TABLET, FILM COATED ORAL at 17:12

## 2021-09-05 RX ADMIN — INSULIN ASPART 2 UNITS: 100 INJECTION, SOLUTION INTRAVENOUS; SUBCUTANEOUS at 17:18

## 2021-09-05 RX ADMIN — HYDRALAZINE HYDROCHLORIDE 25 MG: 25 TABLET, FILM COATED ORAL at 14:36

## 2021-09-05 RX ADMIN — HYDRALAZINE HYDROCHLORIDE 25 MG: 25 TABLET, FILM COATED ORAL at 09:01

## 2021-09-05 RX ADMIN — HYDRALAZINE HYDROCHLORIDE 10 MG: 20 INJECTION INTRAMUSCULAR; INTRAVENOUS at 17:11

## 2021-09-05 RX ADMIN — INSULIN ASPART 1 UNITS: 100 INJECTION, SOLUTION INTRAVENOUS; SUBCUTANEOUS at 12:46

## 2021-09-05 RX ADMIN — CARVEDILOL 6.25 MG: 3.12 TABLET, FILM COATED ORAL at 09:00

## 2021-09-05 RX ADMIN — SODIUM BICARBONATE 650 MG: 648 TABLET ORAL at 21:09

## 2021-09-05 ASSESSMENT — MIFFLIN-ST. JEOR: SCORE: 1486.95

## 2021-09-05 NOTE — PROGRESS NOTES
Care Management Follow Up    Length of Stay (days): 2    Expected Discharge Date:  9/6/21 or 9/7/21       Concerns to be Addressed:   Alteration in renal function: nephrology following.   Patient plan of care discussed at interdisciplinary rounds: Yes    Anticipated Discharge Disposition:  Home.     Anticipated Discharge Services:  None.  Anticipated Discharge DME:  None.    Patient/family educated on Medicare website which has current facility and service quality ratings:  NA  Education Provided on the Discharge Plan:  Per team.  Patient/Family in Agreement with the Plan:  Yes    Referrals Placed by CM/SW:  None  Private pay costs discussed: Not applicable at this time.    Additional Information:  Patient lives alone and is independent with activities of daily living at baseline.  No care management needs identified at this time but CM will continue to monitor progression of care, review team recommendations and provide discharge planning assist as needed.      Mercedes Black RN

## 2021-09-05 NOTE — PLAN OF CARE
Problem: Hypertension Acute  Goal: Blood Pressure Within Desired Range  Outcome: No Change   Vg=683/81,175/84, 201/109. Pt asymptomatic. Medicated after each BP, additional correg given as ordered.

## 2021-09-05 NOTE — PLAN OF CARE
Problem: Hypertension Acute  Goal: Blood Pressure Within Desired Range  Outcome: Improving     BP much better controlled. Latest /77.

## 2021-09-05 NOTE — PLAN OF CARE
Problem: Adult Inpatient Plan of Care  Goal: Optimal Comfort and Wellbeing  Outcome: No Change     Problem: Hypertension Acute  Goal: Blood Pressure Within Desired Range  Outcome: Declining      Spent 40 minutes educating patient and mother on Blood Pressures and Kidneys. Nephrology states she has hx of poorly controlled diabetes, and HTN. Now in Stage IV Chronic Kidney Disease. Patient's blood pressure remains high.     Gave PRN IV Hydralazine 10 mg at 1711.  At 1814 /89  At 1909 /98, text paged Hospitalist. Dr. Medina, and she told me to give scheduled po hydralazine 25 mg 1 hr early. I could not give PRN IV hydralazine r/t q 6 hr.    Pertinent Labs (trending)  Creatinine: 4.46, 4.35, 4.85  K+: 4.3, 5.1, 5.0      Dr. Leija (Nephrology) Notes (9/05, below)  Patient serum creatinine was 3.2 mg/dL in June 2021. Today SCr is 4.85 mg/dl. Continue to hold prior to admission losartan. Monitor renal function and urine output daily while inpatient. Patient was advised to follow up in our clinic once per month for close monitoring. Avoid nephrotoxins. Our clinic will schedule follow up visit in 3-4 weeks    -Increase Coreg dose to 12.5 mg BID  -Continue current dose of amlodipine and hydrochlorothiazide  -Monitor blood pressure closely. OK to discharge home with close follow up if SBP is persistently below 140.  -Low-sodium diet

## 2021-09-05 NOTE — PROGRESS NOTES
RENAL PROGRESS NOTE    CC:  Hypertensive urgency    ASSESSMENT & PLAN:   36 year old female with past medical history significant for poorly controlled diabetes mellitus, hypertension and stage IV chronic kidney disease who presented for further management of uncontrolled hypertension.     End-stage renal disease-likely secondary to diabetic nephropathy given prolonged history of poorly controlled diabetes and nephrotic range proteinuria.  Secondary FSGS is on differential as well  given patient's obesity.  Patient does have history of chronic kidney at least going back to 2020.  Patient's renal function progressively declined in the last 2 years.  Patient serum creatinine was 3.2 mg/dL in June 2021.  Patient presented serum creatinine of 4.4 milligrams per deciliter. Today SCr is 4.85 mg/dl.  UOP in non oliguric range  UACR was 5 g/g in 01/2021. UPCR is wnl 09/04/21.  Work-up for monoclonal gammaopathy in the past was negative.  TMA is unlikely given normal platelet count.  No indications for renal biopsy given chronic changes seen renal ultrasound in May 2020.  No indication for initiation of dialysis given no sign or symptoms of uremia.  Continue to hold prior to admission losartan  Monitor renal function and urine output daily while inpatient   Patient was advised to follow up in our clinic once per month for close monitoring  Avoid nephrotoxins  Our clinic will schedule follow up visit in 3-4 weeks       Hyperkalemia-mild.  Serum.  Likely multifactorial secondary to advanced kidney disease, metabolic acidosis, dietary discretions and ARB.  Serum K is 5.0 this am  Renal diet.   -hold  Losartan  -trend     Metabolic acidosis-mild.  Serum bicarbonate is 21.  Likely secondary to advanced chronic kidney disease.  Hold bicarbonate 22-27 patient's chronic disease.  Started on sicarbonate 650 mg twice daily.     Hypertensive urgency-prior to admission patient was taken amlodipine 10 mg daily and losartan 100 mg  daily.  Losartan held on admission given  progression of chronic kidney disease.  Amlodipine was continued.  Patient was started on hydrochlorothiazide 12.5 mg daily and Coreg 6.25 mg BID.  Patient blood pressure is suboptimally controlled this am. Received Hydralazine.  -Increase Coreg dose to 12.5 mg BID  -Continue current dose of amlodipine and hydrochlorothiazide  -Monitor blood pressure closely. OK to discharge home with close follow up if SBP is persistently below 140.  -Low-sodium diet     Normocytic anemia-likely secondary to advanced chronic kidney disease.  Patient presented with hemoglobin of 10.2.  Today hemoglobin is trended down to 9.1 g/dL.  No signs of active bleeding.  Iron deficient ( Isats 17%, iron 38). TSH wnl.  -start on oral ferrous sulfate 325 mg PO daily along with vitamin C 250 mg daily     Blood mineral disease of CKD  -Serum calcium corrected for hypoalbuminemia is within normal limit around 9.2  -hyperphosphatemia-serum phosphorus is mildly elevated at 4.9. Renal diet.  -secondary hyperparathyroidism-PTH is 341. Start on calcitriol 0.25 mg three times per week.     Leukocytosis-mild.  Patient is afebrile.  No signs of infection.     Type 2 diabetes-diagnosed in 2012.  Poorly controlled historically.  Most recent hemoglobin A1c percent.  Complicated by diabetic retinopathy.  Prior to admission patient was taking glipizide 5 mg daily and insulin.  Will defer management to hospitalist service.      Discussed with .       Will follow.        Nataliia Leija MD  [facility], [address], [address]  [place] [address]  Phone# [phone]  Fax# [phone]    SUBJECTIVE:    No acute issues overnight. Patient states that she is feeling the same, no new complains.  Patient denies: fever, chills, weight loss, dizziness, adenopathy, sore throat, rhinorrhea, cough, shortness of breath , chest pain, palpitations, orthopnea, nausea, vomiting, abdominal  pain, changes in bowel habits, dysuria, urinary frequency, urgency, hematuria, rash      OBJECTIVE:  Physical Exam   Temp: 99.4  F (37.4  C) Temp src: Oral BP: (!) 175/84 Pulse: 89   Resp: 18 SpO2: 99 % O2 Device: None (Room air)    Vitals:    09/03/21 2209 09/04/21 0638 09/05/21 0641   Weight: 88.3 kg (194 lb 11.2 oz) 87.5 kg (193 lb) 87 kg (191 lb 14.4 oz)     Vital Signs with Ranges  Temp:  [97.8  F (36.6  C)-99.4  F (37.4  C)] 99.4  F (37.4  C)  Pulse:  [] 89  Resp:  [16-18] 18  BP: (146-215)/(77-99) 175/84  SpO2:  [92 %-99 %] 99 %  I/O last 3 completed shifts:  In: 600 [P.O.:600]  Out: 1350 [Urine:1350]    @TMAXR(24)@    Patient Vitals for the past 72 hrs:   Weight   09/05/21 0641 87 kg (191 lb 14.4 oz)   09/04/21 0638 87.5 kg (193 lb)   09/03/21 2209 88.3 kg (194 lb 11.2 oz)   09/03/21 1611 88 kg (194 lb)   [unfilled]    PHYSICAL EXAM:  General - Alert and oriented x3, appears comfortable, NAD  Cardiovascular - Regular rate and rhythm, no rub  Respiratory - Clear to auscultation bilaterally, no crackles or wheezes  Abd: BS present, no guarding or pain with palpation, no ascites  Extremities - No lower extremity edema bilaterally  Skin: dry, intact, no rash, good turgor  Neuro:  Grossly intact, no focal deficits  MSK:  Grossly intact  Psych:  Normal affect    LABORATORY STUDIES:     Recent Labs   Lab 09/05/21  0943 09/04/21  0544 09/03/21  1740   WBC 11.9* 13.1* 14.1*   RBC 3.49* 3.38* 3.81   HGB 9.3* 9.1* 10.2*   HCT 29.5* 28.1* 31.4*    313 337       Basic Metabolic Panel:  Recent Labs   Lab 09/05/21  1115 09/05/21  0943 09/05/21  0728 09/04/21  2041 09/04/21  1634 09/04/21  1215 09/04/21  0544 09/03/21  1740   NA  --  137  --   --   --   --  140 140   POTASSIUM  --  5.0  --   --   --   --  5.1* 4.3   CHLORIDE  --  108*  --   --   --   --  110* 110*   CO2  --  21*  --   --   --   --  21* 20*   BUN  --  46*  --   --   --   --  35* 38*   CR  --  4.85*  --   --   --   --  4.35* 4.46*   * 151*  118 154* 186* 117 75 85   PAUL  --  8.0*  --   --   --   --  7.9* 8.1*       INRNo lab results found in last 7 days.     Recent Labs   Lab Test 09/05/21  0943 09/04/21  0544   WBC 11.9* 13.1*   HGB 9.3* 9.1*    313       Personally reviewed current labs     ~ 35 minutes spent in exam, POC, education regarding renal disease and management.  >90% time spent in education and counseling and/or discussion with patient's care team    Nataliia Leija MD  [facility]  [address] [place] [address], [address]  [place] [address]  Phone# [phone]  Fax# [phone]

## 2021-09-05 NOTE — PLAN OF CARE
Problem: Hypertension Acute  Goal: Blood Pressure Within Desired Range  Outcome: No Change     Problem: Adult Inpatient Plan of Care  Goal: Optimal Comfort and Wellbeing  Outcome: Improving  Intervention: Provide Person-Centered Care  Recent Flowsheet Documentation  Taken 9/4/2021 1600 by Beverly Hodge RN  Trust Relationship/Rapport:    care explained    choices provided    emotional support provided    empathic listening provided    questions encouraged    reassurance provided    questions answered    thoughts/feelings acknowledged     Patient is compliant with cares.     PRN IV Hydralazine 10 mg given at 1355, BP recheck at 1510 was 189/89. Sent text-page to Dr. Jules, new order of scheduled PO Hydralazine 25 mg (tid). /90 at 1928.    Sepsis protocol went off with High BP and elevated WBC. Patients Lactic Acid 0.8.      Evening Telemetry reads Sinus Tachycardia (-105)  PM Telemetry reads Normal Sinus Rhythm, occasional Tachycardia. HR .

## 2021-09-05 NOTE — CONSULTS
CLINICAL NUTRITION SERVICES - EDUCATION NOTE    Received diet education consult for Renal and Low Sodium Diet     NUTRITION HISTORY:  Information obtained from patient and chart:    Diet:  Consistent Carb 60 grams  ?  Living situation:   Patient Lives alone   ?  Grocery shopping:  patient  ?  Meal preparation:  Patient will prepare stir granados or order take-out  ?  Breakfast:  Patient does not routinely eat breakfast  ?  Lunch:  Patient has a combined meal for lunch and supper     Snacks/Supplements:  If patient is craving something sweet she will eat a cookie    Fluids:  Water or sugar free soda  ?  Previous diet instructions:  For Diabetes    ?  NUTRITION DIAGNOSIS:  Food- and nutrition-related knowledge deficit related to CKD and DM as evidenced by following a regular diet, BG , K 5.1, BUN 35, Cr 4.35, GFR 12, Phos 4.9     INTERVENTIONS:  Provided instruction on Renal diet    Provided  the following handouts: Protein Content of Foods, CKD 3-5 Nutrition Therapy, Phosphorus Content of Foods, Potassium Content of Foods    Goals:  Patient verbalizes understanding of diet by agreeing to look over education materials and contact dietitian with any questions     Follow Up:  Patient to ask any further nutrition-related questions before discharge. In addition, pt may request outpatient RD appointment.     Please place additional consults if further nutrition concerns arise

## 2021-09-05 NOTE — PROGRESS NOTES
[facility] Hospitalist Progress Note  [facility]  Summary:    36F with HTN, CKD4, DM2, obesity, med non compliance who was referred to the ER for hypertensive urgency 222/98.     Assessment/Plan    #ULYSSES vs. Progressive CKD4 - likely DM&HTN nephropathy.  nephrology consult    #hypertensive urgency - coreg, norvasc, hydrochlorothiazide, added hydralazine.  Hydralazine PRN. Hold losartan for now    #DM2 - would be good now how much glipizde she needs.  Would start lower given progressive renal disease.    -Start glipizde 2.5mg qday (lower than home dose and shorter acting formuation)  -Needs to be checking BGs at home which she hasn't been doing.  DM educator referral on discharge.    #NAGMA - oral bicarb    #normocytic anemia due to CKD - iron replacement and EPO per nephrology  Med non compliance      Checklist:  Code Status: Full Code    Diet: Consistent Carb 60 grams CHO per Meal Diet    Rendon Catheter: Not present  Central Lines: None  DVT px:  Pneumatic Compression Devices and ambulate until BP better controlled      Expected discharge: In 1-2 days, to prior living arrangement, once BP better controlled    Overnight Events/Subjective/Notable results:  Feeling better.  No dizziness, CP, SOB, HA.  Persistently elevated BP and coreg increased to 12.5mg BID  Refusing tele and non compliant with diet restrictions.  Reports she will have [employer] delivered if we don't liberilize her diet.    4 point ROS otherwise negative    Objective    Vital signs in last 24 hours  Temp:  [97.8  F (36.6  C)-98.9  F (37.2  C)] 98  F (36.7  C)  Pulse:  [] 93  Resp:  [16-18] 16  BP: (146-215)/(77-99) 182/87  SpO2:  [92 %-99 %] 99 % O2 Device: None (Room air)    Weight:   191 lbs 14.4 oz    Intake/Output last 3 shifts  I/O last 3 completed shifts:  In: 600 [P.O.:600]  Out: 1350 [Urine:1350]  Body mass index is 37.48 kg/m .    Physical Exam  General:  Alert, cooperative, no distress,  Appears stated age  Neurologic:   oriented, facialsymmetry preserved, fluent speech.   Psych: calm, mood and affect appropriate to situation  HEENT:  Anicteric, MMM, unremarkable dentition  CV: RRR no MRG, normal S1 and S2, no edema  Lungs: CTAB.  Easyrespirations  Abd: soft, NT, normoactive BS  Skin: no rashes noted on exposed skin. Color and turgor normal  Central Lines and Tubes: None (no gong, CVC, feeding tubes)      I have reviewed all labs, medications, imaging studies in the last 24 hours.  Pertinent findings&changes discussed above.    Data     Discussed with: Dr. Liss Jules MD  Internal Medicine Hospitalist  3:34 PM

## 2021-09-06 VITALS
DIASTOLIC BLOOD PRESSURE: 88 MMHG | HEIGHT: 60 IN | OXYGEN SATURATION: 91 % | SYSTOLIC BLOOD PRESSURE: 147 MMHG | TEMPERATURE: 98.4 F | RESPIRATION RATE: 18 BRPM | WEIGHT: 191.9 LBS | BODY MASS INDEX: 37.67 KG/M2 | HEART RATE: 87 BPM

## 2021-09-06 LAB
CATECHOLS PLAS-IMP: NORMAL
DOPAMINE SERPL-MCNC: <20 PG/ML
EPINEPH PLAS-MCNC: 12 PG/ML
GLUCOSE BLDC GLUCOMTR-MCNC: 156 MG/DL (ref 70–125)
GLUCOSE BLDC GLUCOMTR-MCNC: 167 MG/DL (ref 70–125)
NOREPINEPH PLAS-MCNC: 355 PG/ML

## 2021-09-06 PROCEDURE — 250N000013 HC RX MED GY IP 250 OP 250 PS 637: Performed by: INTERNAL MEDICINE

## 2021-09-06 PROCEDURE — 250N000011 HC RX IP 250 OP 636: Performed by: HOSPITALIST

## 2021-09-06 PROCEDURE — 250N000013 HC RX MED GY IP 250 OP 250 PS 637: Performed by: HOSPITALIST

## 2021-09-06 PROCEDURE — 99239 HOSP IP/OBS DSCHRG MGMT >30: CPT | Performed by: INTERNAL MEDICINE

## 2021-09-06 PROCEDURE — 99233 SBSQ HOSP IP/OBS HIGH 50: CPT | Performed by: INTERNAL MEDICINE

## 2021-09-06 RX ORDER — DOXAZOSIN 1 MG/1
1 TABLET ORAL EVERY 12 HOURS SCHEDULED
Status: DISCONTINUED | OUTPATIENT
Start: 2021-09-06 | End: 2021-09-06 | Stop reason: HOSPADM

## 2021-09-06 RX ORDER — CARVEDILOL 25 MG/1
25 TABLET ORAL 2 TIMES DAILY WITH MEALS
Qty: 60 TABLET | Refills: 1 | Status: SHIPPED | OUTPATIENT
Start: 2021-09-06 | End: 2021-11-09

## 2021-09-06 RX ORDER — HYDRALAZINE HYDROCHLORIDE 50 MG/1
50 TABLET, FILM COATED ORAL 3 TIMES DAILY
Qty: 90 TABLET | Refills: 1 | Status: SHIPPED | OUTPATIENT
Start: 2021-09-06 | End: 2021-12-05

## 2021-09-06 RX ORDER — DOXAZOSIN 1 MG/1
1 TABLET ORAL EVERY 12 HOURS
Qty: 30 TABLET | Refills: 1 | Status: SHIPPED | OUTPATIENT
Start: 2021-09-06 | End: 2021-11-09

## 2021-09-06 RX ORDER — HYDRALAZINE HYDROCHLORIDE 25 MG/1
50 TABLET, FILM COATED ORAL 3 TIMES DAILY
Status: DISCONTINUED | OUTPATIENT
Start: 2021-09-06 | End: 2021-09-06 | Stop reason: HOSPADM

## 2021-09-06 RX ORDER — FUROSEMIDE 20 MG
20 TABLET ORAL DAILY
Status: DISCONTINUED | OUTPATIENT
Start: 2021-09-06 | End: 2021-09-06 | Stop reason: HOSPADM

## 2021-09-06 RX ORDER — SODIUM BICARBONATE 650 MG/1
650 TABLET ORAL 2 TIMES DAILY
Qty: 60 TABLET | Refills: 1 | Status: SHIPPED | OUTPATIENT
Start: 2021-09-06 | End: 2021-11-09

## 2021-09-06 RX ORDER — CARVEDILOL 12.5 MG/1
25 TABLET ORAL 2 TIMES DAILY WITH MEALS
Status: DISCONTINUED | OUTPATIENT
Start: 2021-09-06 | End: 2021-09-06 | Stop reason: HOSPADM

## 2021-09-06 RX ORDER — CALCITRIOL 0.25 UG/1
0.25 CAPSULE, LIQUID FILLED ORAL
Qty: 30 CAPSULE | Refills: 1 | Status: SHIPPED | OUTPATIENT
Start: 2021-09-08 | End: 2022-04-04

## 2021-09-06 RX ORDER — FERROUS SULFATE 325(65) MG
325 TABLET ORAL DAILY
Qty: 30 TABLET | Refills: 1 | Status: SHIPPED | OUTPATIENT
Start: 2021-09-07 | End: 2021-11-09

## 2021-09-06 RX ORDER — FUROSEMIDE 20 MG
20 TABLET ORAL DAILY
Qty: 30 TABLET | Refills: 1 | Status: SHIPPED | OUTPATIENT
Start: 2021-09-07 | End: 2021-11-09

## 2021-09-06 RX ADMIN — HYDRALAZINE HYDROCHLORIDE 50 MG: 25 TABLET, FILM COATED ORAL at 08:25

## 2021-09-06 RX ADMIN — DOXAZOSIN 1 MG: 1 TABLET ORAL at 08:25

## 2021-09-06 RX ADMIN — GLIPIZIDE 2.5 MG: 5 TABLET ORAL at 08:25

## 2021-09-06 RX ADMIN — HYDRALAZINE HYDROCHLORIDE 10 MG: 20 INJECTION INTRAMUSCULAR; INTRAVENOUS at 00:18

## 2021-09-06 RX ADMIN — SODIUM BICARBONATE 650 MG: 648 TABLET ORAL at 08:25

## 2021-09-06 RX ADMIN — AMLODIPINE BESYLATE 10 MG: 5 TABLET ORAL at 08:25

## 2021-09-06 RX ADMIN — CARVEDILOL 25 MG: 12.5 TABLET, FILM COATED ORAL at 08:25

## 2021-09-06 RX ADMIN — HYDROCHLOROTHIAZIDE 12.5 MG: 12.5 TABLET ORAL at 08:25

## 2021-09-06 RX ADMIN — INSULIN ASPART 1 UNITS: 100 INJECTION, SOLUTION INTRAVENOUS; SUBCUTANEOUS at 12:15

## 2021-09-06 RX ADMIN — INSULIN ASPART 2 UNITS: 100 INJECTION, SOLUTION INTRAVENOUS; SUBCUTANEOUS at 08:30

## 2021-09-06 RX ADMIN — CALCITRIOL 0.25 MCG: 0.25 CAPSULE ORAL at 08:25

## 2021-09-06 RX ADMIN — ASPIRIN 81 MG: 81 TABLET, COATED ORAL at 08:25

## 2021-09-06 RX ADMIN — Medication 250 MG: at 08:25

## 2021-09-06 RX ADMIN — FUROSEMIDE 20 MG: 20 TABLET ORAL at 12:14

## 2021-09-06 RX ADMIN — FERROUS SULFATE TAB 325 MG (65 MG ELEMENTAL FE) 325 MG: 325 (65 FE) TAB at 08:25

## 2021-09-06 NOTE — DISCHARGE SUMMARY
[facility] MEDICINE  DISCHARGE SUMMARY     Primary Care Physician: No Ref-Primary, Physician  Admission Date: 9/3/2021   Discharge Provider: Enmanuel Dudley MD Discharge Date: 9/6/2021   Diet: As given below   Code Status: Full Code   Activity: As tolerated        Condition at Discharge: Stable     REASON FOR PRESENTATION(See Admission Note for Details)   Uncontrolled blood pressure    PRINCIPAL & ACTIVE DISCHARGE DIAGNOSES         SIGNIFICANT FINDINGS (Imaging, labs):   No results found.    PENDING LABS   Hypertensive emergency/urgency  ESRD  Hyperkalemia  Metabolic acidosis  Anemia of renal disease  Metabolic bone disease of CKD  Non-insulin-dependent diabetes  PROCEDURES ( this hospitalization only)          RECOMMENDATIONS TO OUTPATIENT PROVIDER FOR F/U VISIT     PCP in 1 week    DISPOSITION     Home    SUMMARY OF HOSPITAL COURSE:      36F with HTN, CKD4, DM2, obesity, med non compliance who was referred to the ER for hypertensive urgency 222/98    ULYSSES vs. Progressive CKD4 - likely DM&HTN nephropathy.  nephrology consult       #hypertensive urgency -medication nonadherence suspected  patient took amlodipine and losartan prior to admission.  Losartan was held on admission given progression of chronic kidney disease.  Amlodipine was continued.  Patient was started on HCTZ 12.5 mg daily and Coreg 6.25 mg twice daily which was increased due to suboptimal blood pressure.  --We will change HCTZ to Lasix given low GFR.  --Nephrology is okay with discharge today  --     #DM2 - would be good now how much glipizde she needs.  Would start lower given progressive renal disease.    -Restart home medications at discharge    #NAGMA -started on oral bicarb by nephrology     #normocytic anemia due to CKD - iron replacement and EPO per nephrology    Med non compliance    Metabolic bone disease of CKD  --Defer to    Discharge Medications with Med changes:        Review of your medicines       START taking      Dose / Directions   calcitRIOL 0.25 MCG capsule  Commonly known as: ROCALTROL      Dose: 0.25 mcg  Start taking on: September 8, 2021  Take 1 capsule (0.25 mcg) by mouth three times a week  Quantity: 30 capsule  Refills: 1     carvedilol 25 MG tablet  Commonly known as: COREG  Used for: Hypertensive urgency      Dose: 25 mg  Take 1 tablet (25 mg) by mouth 2 times daily (with meals)  Quantity: 60 tablet  Refills: 1     doxazosin 1 MG tablet  Commonly known as: CARDURA  Used for: Hypertensive urgency      Dose: 1 mg  Take 1 tablet (1 mg) by mouth every 12 hours  Quantity: 30 tablet  Refills: 1     ferrous sulfate 325 (65 Fe) MG tablet  Commonly known as: FEROSUL      Dose: 325 mg  Start taking on: September 7, 2021  Take 1 tablet (325 mg) by mouth daily  Quantity: 30 tablet  Refills: 1     furosemide 20 MG tablet  Commonly known as: LASIX  Used for: Hypertensive urgency      Dose: 20 mg  Start taking on: September 7, 2021  Take 1 tablet (20 mg) by mouth daily  Quantity: 30 tablet  Refills: 1     hydrALAZINE 50 MG tablet  Commonly known as: APRESOLINE  Used for: Hypertensive urgency      Dose: 50 mg  Take 1 tablet (50 mg) by mouth 3 times daily  Quantity: 90 tablet  Refills: 1     sodium bicarbonate 650 MG tablet      Dose: 650 mg  Take 1 tablet (650 mg) by mouth 2 times daily  Quantity: 60 tablet  Refills: 1     vitamin C 250 MG Tabs tablet  Commonly known as: ASCORBIC ACID      Dose: 250 mg  Start taking on: September 7, 2021  Take 1 tablet (250 mg) by mouth daily  Quantity: 30 tablet  Refills: 1        CONTINUE these medicines which have NOT CHANGED      Dose / Directions   amLODIPine 10 MG tablet  Commonly known as: NORVASC  Used for: Essential hypertension      Dose: 10 mg  Take 1 tablet (10 mg) by mouth daily  Quantity: 90 tablet  Refills: 3     aspirin 81 MG EC tablet  Commonly known as: ASA  Used for: Type 2 diabetes mellitus with chronic kidney disease, without long-term current use of  insulin, unspecified CKD stage (H)      Dose: 81 mg  Take 1 tablet (81 mg) by mouth daily  Quantity: 90 tablet  Refills: 3     MARIA DEL CARMEN CONTOUR test strip  Used for: Type II or unspecified type diabetes mellitus without mention of complication, not stated as uncontrolled  Generic drug: blood glucose      Use to test blood sugars 2 times daily or as directed.  Quantity: 100 strip  Refills: prn     Blood Pressure Monitor Kit  Used for: Type II or unspecified type diabetes mellitus without mention of complication, not stated as uncontrolled      Check blood pressure daily and record  Quantity: 1 kit  Refills: 0     glipiZIDE 5 MG 24 hr tablet  Commonly known as: GLUCOTROL XL      Dose: 1 tablet  Take 1 tablet by mouth daily  Refills: 0     insulin pen needle 31G X 5 MM miscellaneous  Commonly known as: B-D U/F  Used for: Type 2 diabetes mellitus with retinopathy without macular edema, without long-term current use of insulin, unspecified laterality, unspecified retinopathy severity (H)      Use 1 pen needles a week  Quantity: 50 each  Refills: 1        STOP taking    losartan 100 MG tablet  Commonly known as: COZAAR              Where to get your medicines      These medications were sent to [id number] - [facility], [place] - [address] [facility]  [address] [address]    Phone: [phone]   carvedilol 25 MG tablet  doxazosin 1 MG tablet  ferrous sulfate 325 (65 Fe) MG tablet  furosemide 20 MG tablet  hydrALAZINE 50 MG tablet  sodium bicarbonate 650 MG tablet  vitamin C 250 MG Tabs tablet     These medications are not ready yet because we are checking if your insurance will help you pay for them    Call your pharmacy to confirm that your medication is ready for pickup. It may take up to 24 hours for them to receive the prescription. If the prescription is not ready within 3 business days, please contact your clinic or your  provider.  calcitRIOL 0.25 MCG capsule             Rationale for medication changes:      Losartan was stopped due to worsening kidney function.  Lasix, carvedilol, doxorubicin, hydralazine were added due to uncontrolled blood pressure  Sodium bicarbonate for non-anion gap metabolic acidosis  Iron and vitamin C for anemia of chronic kidney disease        Consults   Nephrology      Immunizations given this encounter     Immunization History   Administered Date(s) Administered     COVID-19,PF,Moderna 03/29/2021, 04/25/2021     DTAP (<7y) 1986, 1986, 1986, 07/29/1988, 04/17/1991     Hep B, Peds or Adolescent 09/15/1998, 11/06/1998, 03/24/1999     HepB 09/15/1998, 11/06/1998, 03/24/1999     HepB, Unspecified 09/15/1998, 11/06/1998, 03/24/1999     Historical DTP/aP 1986, 1986, 1986, 07/29/1988, 04/17/1991     Influenza (IIV3) PF 09/26/2012     Influenza Vaccine, 6+MO IM (QUADRIVALENT W/PRESERVATIVES) 11/08/2013     MMR 04/25/1988, 04/17/1991, 04/21/1998     Pneumococcal 23 valent 11/08/2013     Polio, Unspecified  1986, 1986, 1986, 07/29/1988, 04/17/1991     Poliovirus, inactivated (IPV) 1986, 1986, 1986, 07/29/1988, 04/17/1991     TD (ADULT, 7+) 04/21/1998     TDAP Vaccine (Boostrix) 11/08/2013     Td (Adult), Adsorbed 04/21/1998     Tdap (Adacel,Boostrix) 11/08/2013          Anticoagulation Information      Recent INR results: No lab results found.  Warfarin doses (if applicable) or name of other anticoagulant: Not applicable      Discharge Orders     Discharge Procedure Orders   Follow-up and recommended labs and tests    Order Comments: Follow up with primary care provider, Physician No Ref-Primary, within 7 days for hospital follow- up.  Check BMP on follow-up  Follow-up in the nephrologist in 3 to 4 weeks.     Activity   Order Comments: Your activity upon discharge: activity as tolerated     Order Specific Question Answer Comments   Is  discharge order? Yes      Reason for your hospital stay   Order Comments: Uncontrolled blood pressure     Discharge Instructions   Order Comments: Recommend medication adherence     Diet   Order Comments: Follow this diet upon discharge: Orders Placed This Encounter      Combination Diet Renal Diet; Consistent Carb 60 Grams CHO per Meal Diet; 3 gm NA Diet     Order Specific Question Answer Comments   Is discharge order? Yes      Examination     Vital Signs in last 24 hours:   Vital signs:  Temp: 98.4  F (36.9  C) Temp src: Oral BP: (!) 147/88 Pulse: 87   Resp: 18 SpO2: 91 % O2 Device: None (Room air)   Height: 152.4 cm (5') Weight: 87 kg (191 lb 14.4 oz)  Estimated body mass index is 37.48 kg/m  as calculated from the following:    Height as of this encounter: 1.524 m (5').    Weight as of this encounter: 87 kg (191 lb 14.4 oz).        General appearance: alert, appears stated age, cooperative and No leg swelling or crackles in the lungs on examination       Please see EMR for more detailed significant labs, imaging, consultant notes etc.  Total time spent on discharge: 35 minutes    Enmanuel Dudley MD   [facility] Hospitalist Service: Ph:[phone]    CC:No Ref-Primary, Physician

## 2021-09-06 NOTE — PROGRESS NOTES
Pt reported no pain this shift. Hypertensive >190 SBP. Gave PRN hydralazine 10mg at 0000 which was ineffective in controlling BP. Paged and spoke with house officer but no new orders at this time. This AM BP better at 156/84.   Independent in the room.

## 2021-09-06 NOTE — PROGRESS NOTES
RENAL PROGRESS NOTE    CC:  Hypertensive urgency    ASSESSMENT & PLAN:   36 year old female with past medical history significant for poorly controlled diabetes mellitus, hypertension and stage IV chronic kidney disease who presented for further management of uncontrolled hypertension.     End-stage renal disease-likely secondary to diabetic nephropathy given prolonged history of poorly controlled diabetes and nephrotic range proteinuria.  Secondary FSGS is on differential as well  given patient's obesity.  Patient does have history of chronic kidney at least going back to 2020.  Patient's renal function progressively declined in the last 2 years.  Patient serum creatinine was 3.2 mg/dL in June 2021.  Patient presented serum creatinine of 4.4 milligrams per deciliter. Today SCr is 4.85 mg/dl.  UOP in non oliguric range  UACR was 5 g/g in 01/2021. UPCR is wnl 09/04/21.  Work-up for monoclonal gammaopathy in the past was negative.  TMA is unlikely given normal platelet count.  No indications for renal biopsy given chronic changes seen renal ultrasound in May 2020.  No indication for initiation of dialysis given no sign or symptoms of uremia.  I discussed in length with the patient different forms of dialysis including PD and HD as well as renal transplant. Our clinic will refer patient to kidney smart class for more education.  Continue to hold prior to admission losartan  Patient was advised to follow up in our clinic once per month for close monitoring. Our clinic will schedule follow up visit in 3-4 weeks  Avoid nephrotoxins      Hyperkalemia-mild.  Serum.  Likely multifactorial secondary to advanced kidney disease, metabolic acidosis, dietary discretions and ARB.  Serum K was 5.0 09/05  Discussed with the patient the importance of Renal diet.   Continue to hold  Losartan on discharge       Metabolic acidosis-mild.  Serum bicarbonate is 21.  Likely secondary to advanced chronic kidney disease.  Hold bicarbonate  22-27 patient's chronic disease.  Started on sicarbonate 650 mg twice daily.     Hypertensive urgency-prior to admission patient was taken amlodipine 10 mg daily and losartan 100 mg daily.  Losartan held on admission given  progression of chronic kidney disease.  Amlodipine was continued.  Patient was started on hydrochlorothiazide 12.5 mg daily and Coreg 12.5 mg BID.  Patient blood pressure is suboptimally controlled this am. Received Hydralazine.  Patient's BP improved to 148/90 after I I increased Coreg dose to 25 mg BID and started on Doxazosin 1 mg BID  -Continue current dose of amlodipine   -Monitor blood pressure closely. OK to discharge home with close follow up if SBP is persistently below 150.  -Discussed in length with the patient the importance of Low-sodium diet     Normocytic anemia-likely secondary to advanced chronic kidney disease and iron deficiency.  Patient presented with hemoglobin of 10.2.  Hemoglobin was 9.3 g/dL 09/05.  No signs of active bleeding.  Iron deficient ( Isats 17%, iron 38). TSH wnl.  -started on oral ferrous sulfate 325 mg PO daily along with vitamin C 250 mg daily during this admission. Please continue on discharge  -our clinic will arrange follow up CBC in 3-4 weeks     Blood mineral disease of CKD  -Serum calcium corrected for hypoalbuminemia is within normal limit around 9.2  -hyperphosphatemia-serum phosphorus is mildly elevated at 4.9. Renal diet.  -secondary hyperparathyroidism-PTH is 341. Started on calcitriol 0.25 mg three times per week.Please continue on discharge     Leukocytosis-mild.  Patient is afebrile.  No signs of infection.     Type 2 diabetes-diagnosed in 2012.  Poorly controlled historically.  Most recent hemoglobin A1c percent.  Complicated by diabetic retinopathy.  Prior to admission patient was taking glipizide 5 mg daily and insulin.  Will defer management to hospitalist service.          Will follow.        Nataliia Leija MD  [facility]  [address], [address]  [place] [address]  Phone# [phone]  Fax# [phone]    SUBJECTIVE:    No acute issues overnight. Patient states that she is feeling the same, no new complains.  Patient denies: fever, chills, weight loss, dizziness, adenopathy, sore throat, rhinorrhea, cough, shortness of breath , chest pain, palpitations, orthopnea, nausea, vomiting, abdominal pain, changes in bowel habits, dysuria, urinary frequency, urgency, hematuria, rash      OBJECTIVE:  Physical Exam   Temp: 97.5  F (36.4  C) Temp src: Oral BP: (!) 148/90 Pulse: 87   Resp: 18 SpO2: 99 % O2 Device: None (Room air)    Vitals:    09/03/21 2209 09/04/21 0638 09/05/21 0641   Weight: 88.3 kg (194 lb 11.2 oz) 87.5 kg (193 lb) 87 kg (191 lb 14.4 oz)     Vital Signs with Ranges  Temp:  [97.5  F (36.4  C)-99.4  F (37.4  C)] 97.5  F (36.4  C)  Pulse:  [] 87  Resp:  [16-20] 18  BP: (148-207)/() 148/90  SpO2:  [90 %-99 %] 99 %  I/O last 3 completed shifts:  In: 1320 [P.O.:1320]  Out: 400 [Urine:400]    @TMAXR(24)@    Patient Vitals for the past 72 hrs:   Weight   09/05/21 0641 87 kg (191 lb 14.4 oz)   09/04/21 0638 87.5 kg (193 lb)   09/03/21 2209 88.3 kg (194 lb 11.2 oz)   09/03/21 1611 88 kg (194 lb)   [unfilled]    PHYSICAL EXAM:  General - Alert and oriented x3, appears comfortable, NAD  Cardiovascular - Regular rate and rhythm, no rub  Respiratory - Clear to auscultation bilaterally, no crackles or wheezes  Abd: BS present, no guarding or pain with palpation, no ascites  Extremities - No lower extremity edema bilaterally  Skin: dry, intact, no rash, good turgor  Neuro:  Grossly intact, no focal deficits  MSK:  Grossly intact  Psych:  Normal affect    LABORATORY STUDIES:     Recent Labs   Lab 09/05/21  0943 09/04/21  0544 09/03/21  1740   WBC 11.9* 13.1* 14.1*   RBC 3.49* 3.38* 3.81   HGB 9.3* 9.1* 10.2*   HCT 29.5* 28.1* 31.4*    313 337       Basic Metabolic Panel:  Recent Labs   Lab  09/06/21  0827 09/05/21  2034 09/05/21  1647 09/05/21  1115 09/05/21  0943 09/05/21  0728 09/04/21  0544 09/03/21  1740   NA  --   --   --   --  137  --  140 140   POTASSIUM  --   --   --   --  5.0  --  5.1* 4.3   CHLORIDE  --   --   --   --  108*  --  110* 110*   CO2  --   --   --   --  21*  --  21* 20*   BUN  --   --   --   --  46*  --  35* 38*   CR  --   --   --   --  4.85*  --  4.35* 4.46*   * 129* 173* 143* 151* 118 75 85   PAUL  --   --   --   --  8.0*  --  7.9* 8.1*       INRNo lab results found in last 7 days.     Recent Labs   Lab Test 09/05/21  0943 09/04/21  0544   WBC 11.9* 13.1*   HGB 9.3* 9.1*    313       Personally reviewed current labs     ~ 35 minutes spent in exam, POC, education regarding renal disease and management.  >90% time spent in education and counseling and/or discussion with patient's care team    Nataliia Leija MD  [facility]  [address] [place] [address], [address]  [place] [address]  Phone# [phone]  Fax# [phone]

## 2021-09-13 ENCOUNTER — TRANSFERRED RECORDS (OUTPATIENT)
Dept: HEALTH INFORMATION MANAGEMENT | Facility: CLINIC | Age: 36
End: 2021-09-13

## 2021-10-07 ENCOUNTER — TRANSFERRED RECORDS (OUTPATIENT)
Dept: HEALTH INFORMATION MANAGEMENT | Facility: CLINIC | Age: 36
End: 2021-10-07

## 2021-10-12 ENCOUNTER — TRANSFERRED RECORDS (OUTPATIENT)
Dept: HEALTH INFORMATION MANAGEMENT | Facility: CLINIC | Age: 36
End: 2021-10-12

## 2021-10-12 ENCOUNTER — MEDICAL CORRESPONDENCE (OUTPATIENT)
Dept: HEALTH INFORMATION MANAGEMENT | Facility: CLINIC | Age: 36
End: 2021-10-12

## 2021-10-12 ENCOUNTER — TRANSCRIBE ORDERS (OUTPATIENT)
Dept: MULTI SPECIALTY CLINIC | Facility: CLINIC | Age: 36
End: 2021-10-12

## 2021-10-12 DIAGNOSIS — N18.4 CHRONIC KIDNEY DISEASE, STAGE IV (SEVERE) (H): Primary | ICD-10-CM

## 2021-10-20 DIAGNOSIS — N18.6 ENCOUNTER REGARDING VASCULAR ACCESS FOR DIALYSIS FOR ESRD (H): Primary | ICD-10-CM

## 2021-10-20 DIAGNOSIS — Z99.2 ENCOUNTER REGARDING VASCULAR ACCESS FOR DIALYSIS FOR ESRD (H): Primary | ICD-10-CM

## 2021-11-09 DIAGNOSIS — I16.0 HYPERTENSIVE URGENCY: ICD-10-CM

## 2021-11-09 DIAGNOSIS — N18.9 ACUTE RENAL FAILURE SUPERIMPOSED ON CHRONIC KIDNEY DISEASE, UNSPECIFIED CKD STAGE, UNSPECIFIED ACUTE RENAL FAILURE TYPE: ICD-10-CM

## 2021-11-09 DIAGNOSIS — N17.9 ACUTE RENAL FAILURE SUPERIMPOSED ON CHRONIC KIDNEY DISEASE, UNSPECIFIED CKD STAGE, UNSPECIFIED ACUTE RENAL FAILURE TYPE: ICD-10-CM

## 2021-11-09 RX ORDER — CARVEDILOL 25 MG/1
25 TABLET ORAL 2 TIMES DAILY WITH MEALS
Qty: 60 TABLET | Refills: 1 | Status: SHIPPED | OUTPATIENT
Start: 2021-11-09 | End: 2022-01-17

## 2021-11-09 RX ORDER — DOXAZOSIN 1 MG/1
1 TABLET ORAL EVERY 12 HOURS
Qty: 30 TABLET | Refills: 1 | Status: SHIPPED | OUTPATIENT
Start: 2021-11-09 | End: 2022-01-17

## 2021-11-09 RX ORDER — SODIUM BICARBONATE 650 MG/1
650 TABLET ORAL 2 TIMES DAILY
Qty: 180 TABLET | Refills: 1 | Status: SHIPPED | OUTPATIENT
Start: 2021-11-09 | End: 2022-06-14

## 2021-11-09 RX ORDER — FUROSEMIDE 20 MG
20 TABLET ORAL DAILY
Qty: 90 TABLET | Refills: 1 | Status: ON HOLD | OUTPATIENT
Start: 2021-11-09 | End: 2022-04-13

## 2021-11-09 RX ORDER — FERROUS SULFATE 325(65) MG
325 TABLET ORAL DAILY
Qty: 90 TABLET | Refills: 3 | Status: SHIPPED | OUTPATIENT
Start: 2021-11-09 | End: 2022-04-04

## 2021-11-09 NOTE — TELEPHONE ENCOUNTER
Pending Prescriptions:                       Disp   Refills    sodium bicarbonate 650 MG tablet          180 ta*1            Sig: Take 1 tablet (650 mg) by mouth 2 times daily    furosemide (LASIX) 20 MG tablet           90 tab*1            Sig: Take 1 tablet (20 mg) by mouth daily    ferrous sulfate (FEROSUL) 325 (65 Fe) MG *90 tab*3            Sig: Take 1 tablet (325 mg) by mouth daily

## 2021-11-09 NOTE — TELEPHONE ENCOUNTER
Reason for Call:  Other prescription    Detailed comments: pt called back & also needs the following refilled:  carvedilol (COREG) 25 MG tablet  doxazosin (CARDURA) 1 MG tablet    Phone Number Patient can be reached at: Cell number on file:    Telephone Information:   Mobile [phone]       Best Time: na    Can we leave a detailed message on this number? YES    Call taken on 11/9/2021 at 11:38 AM by Arielle Stephens

## 2021-12-03 ENCOUNTER — TELEPHONE (OUTPATIENT)
Dept: FAMILY MEDICINE | Facility: CLINIC | Age: 36
End: 2021-12-03
Payer: COMMERCIAL

## 2021-12-03 DIAGNOSIS — I16.0 HYPERTENSIVE URGENCY: ICD-10-CM

## 2021-12-03 NOTE — TELEPHONE ENCOUNTER
Reason for Call:  Medication or medication refill:    Do you use a [facility]?  Name of the pharmacy and phone number for the current request:  [id number] - [facility], [place] - [address]  [address] [place]    Name of the medication requested: hydrALAZINE (APRESOLINE) 50 MG tablet    Other request: na    Can we leave a detailed message on this number? YES    Phone number patient can be reached at: Home number on file [phone] (home)    Best Time: any    Call taken on 12/3/2021 at 12:03 PM by AMIRA JUNIOR     218

## 2021-12-05 RX ORDER — HYDRALAZINE HYDROCHLORIDE 50 MG/1
50 TABLET, FILM COATED ORAL 3 TIMES DAILY
Qty: 90 TABLET | Refills: 1 | Status: SHIPPED | OUTPATIENT
Start: 2021-12-05 | End: 2022-04-04

## 2021-12-05 NOTE — TELEPHONE ENCOUNTER
Please inform patient that I have refilled her hydralazine.  It looks like she has reschedule her appointment with the vascular surgeon.  It is very important that she gets access to dialysis in case of an emergency.  She should let me know if she has any questions.    Toby Howard MD

## 2021-12-13 ENCOUNTER — ANCILLARY PROCEDURE (OUTPATIENT)
Dept: VASCULAR ULTRASOUND | Facility: CLINIC | Age: 36
End: 2021-12-13
Attending: SURGERY
Payer: COMMERCIAL

## 2021-12-13 DIAGNOSIS — N18.6 ENCOUNTER REGARDING VASCULAR ACCESS FOR DIALYSIS FOR ESRD (H): ICD-10-CM

## 2021-12-13 DIAGNOSIS — Z99.2 ENCOUNTER REGARDING VASCULAR ACCESS FOR DIALYSIS FOR ESRD (H): ICD-10-CM

## 2021-12-13 PROCEDURE — 93970 EXTREMITY STUDY: CPT | Mod: 26 | Performed by: SURGERY

## 2021-12-13 PROCEDURE — 93970 EXTREMITY STUDY: CPT

## 2021-12-14 ENCOUNTER — VIRTUAL VISIT (OUTPATIENT)
Dept: VASCULAR SURGERY | Facility: CLINIC | Age: 36
End: 2021-12-14
Payer: COMMERCIAL

## 2021-12-14 VITALS
BODY MASS INDEX: 37.5 KG/M2 | HEIGHT: 60 IN | HEART RATE: 83 BPM | DIASTOLIC BLOOD PRESSURE: 78 MMHG | SYSTOLIC BLOOD PRESSURE: 136 MMHG | WEIGHT: 191 LBS

## 2021-12-14 DIAGNOSIS — N18.4 CHRONIC KIDNEY DISEASE, STAGE IV (SEVERE) (H): Primary | ICD-10-CM

## 2021-12-14 PROCEDURE — 99203 OFFICE O/P NEW LOW 30 MIN: CPT | Mod: 95 | Performed by: SURGERY

## 2021-12-14 ASSESSMENT — MIFFLIN-ST. JEOR: SCORE: 1482.87

## 2021-12-14 NOTE — PROGRESS NOTES
36-year-old female who is scheduled for video visit.  Patient is diagnosed with chronic kidney disease and will require dialysis potentially.  We went over the options including peritoneal dialysis and hemodialysis, patient would like to have a fistula created  We went over potential risk benefits of the procedure as well as long-term outcomes after fistula creation.  Patient understood and would like to think about it.  She will call us back with a final decision.  Video visit lasted for 15 minutes.

## 2021-12-14 NOTE — PATIENT INSTRUCTIONS
Please reach out to us after you have made a decision. We are happy to discuss further if you have any additional questions.    Please call our [facility] if you have any questions or concerns. This will be the best way to contact Dr. Joya or his nurse.     Dr Farurkh Joya MD, Vascular Surgery  Phone: [phone]  Fax: [phone]  RN Case Manager: Rhoda Reynoso & Kaylyn    What to Expect After Your Dialysis Access Surgery  Hospital Stay    You can expect to go home the same day as your surgery.    Medications    Take all medications exactly as directed. You may need to stop some taking some blood thinners prior to surgery. Your specific medications will be discussed with you prior going home.    Incision Care     Your incision sites may have some bruising and minor swelling for about one week.    If your incision is sealed with Dermabond (glue) you may shower and leave the incision open to air the following day. When showering do not rub incision, let the warm soapy water run over it and pat dry. DO NOT SOAK INCISION IN A TUB/POOL/Etc until healed. Keep incisions dry and covered until they begin to heal.     Restrictions     You may use the arm freely after the site heals. Keep the following in mind:     Avoid pressure on the arm, lifting heavy objects with the arm, sleeping on the arm with the graft or fistula, and wearing tight-sleeved shirts or jewelry around the graft or fistula.     Do not allow blood pressure monitoring or needle punctures (other than dialysis) on the side where the graft or fistula is located.    Fistula/Graft Use - Fistulas need to be assessed at a follow up appointment with your surgeon prior to it being used for dialysis.     Follow-Up  If a follow up appointment was not scheduled prior to your procedure please call [phone]. Follow up appointments are scheduled with either your Physician or one a member of our care team.     Risks and Possible  Complications     Steal  Syndrome - You may initially feel some coolness or numbness in the hand with the fistula. These sensations usually go away in a few weeks as your circulation compensates for the fistula. However, if these sensations are severe or don't disappear, tell your physician as soon as possible, because the fistula may be causing too much blood to flow away from your hand, a condition physicians call a  steal.     Infection/ Drainage/Bleeding/ Excessive Swelling - If there is any drainage or bleeding it should be a very small amount. If you have excessive bleeding, any milky drainage, or redness from the incisions call your doctor right away. Minor swelling is normal, if your experience excessive swelling call right away.     Graft/Fistula Narrowing or Occlusion - Grafts and fistulas may develop thrombosis or stenosis, essentially blocking or partially blocking blood flow within the created graft or fistula. To assess blood flow place your fingers over the graft or fistula and feel for a vibration or a  thrill .   THIS SHOULD BE DONE EVERY DAY! IF THRILL CANNOT BE FELT PLEASE CONTACT YOUR SURGEON AS SOON AS POSSIBLE.          Creating a Hemodialysis Access  Before hemodialysis can be done, a way for blood to leave and return to your body (an access) is needed. During hemodialysis, needles placed into the access carry blood to and from the dialyzer. A hemodialysis access is often made in your arm. The 2 main types of accesses are an arteriovenous fistula (AV fistula) and an arteriovenous graft (AV graft).   Making your access  The hemodialysis access provides a large volume of fast flowing blood. It involves surgery under anesthesia. You may be able to go home the same day. It is made during a short procedure using one of these methods:     A fistula is made by linking an artery to a nearby vein. The high pressure and blood flow in the artery are transferred into the vein. They help the vein grow in  size and thickness. The enlarged vein (fistula) eventually has high blood flow. It becomes thick enough for needles to be placed safely several times each week during hemodialysis. It may need weeks or months to develop before it's ready to be used. A fistula works better than the graft. It also has fewer long-term problems. It is the preferred form of access.     Fistula     A graft (piece of synthetic tube) may be sewn between an artery and a vein. It's often used if a fistula is not possible because of the small size of your veins. Blood flows quickly through the graft from the artery to the vein. A graft is often ready to use in a few weeks. Needles can be placed into the plastic tube to get blood during dialysis.     Graft   Both types of access may take weeks to months before they can be used. If dialysis is needed right away, a short-term venous catheter is used. A catheter that allows two-way blood flow is placed into a large vein. The dialysis tubing is then connected to the catheter. If both the AV fistula and graft don't work, a more permanent venous catheter is used.   The most common complications for hemodialysis access are:     Infection    Clotting    Decreased blood flow from clotting or other narrowing  [employer] last reviewed this educational content on 4/1/2020 2000-2021 [employer]. All rights reserved. This information is not intended as a substitute for professional medical care. Always follow your healthcare professional's instructions.      Exercising for your fistula after surgery    An AV fistula must mature for several weeks or months before it can be used for hemodialysis, so after it is surgically created, your doctor will ask you to work on strengthening it. The more access arm exercises you do to help strengthen it, the sooner you ll be able to use your fistula. Your doctor may recommend certain arm and finger exercises that will strengthen the fistula. The exercises  your doctor recommends will depend on where your fistula is located. Fistulas are usually located in the forearm or upper arm. Before you start any exercise, it s important to consult your doctor.    Use your arm for all of your usual activities. After seven days from the date of your surgery apply a tourniquet above your elbow tight enough to make the veins distend but not so tight that your hand goes to sleep or becomes extremely uncomfortable. Leave the tourniquet on for twenty to thirty minutes at a time, four times a day. Keep doing this until the fistula is used for dialysis.      Typically recommend 10-15 minutes 3-4 times a day.    Keeping your fistula clean  Once your AV fistula is strong enough to be used for hemodialysis, it is crucial that you keep it clean. Although a fistula is less prone to infection than other dialysis types, proper hygiene is still important:    Look for redness or swelling around the fistula area.     If you experience any pain in the fistula area, tell your doctor immediately.     If you get a fever, this can be a sign of infection.     Wash and pat dry your fistula arm thoroughly right before each treatment. Your dialysis facility will provide you with supplies.                  What to Expect After Your Dialysis Access Surgery  Hospital Stay    You can expect to go home the same day as your surgery.  Medications    Take all medications exactly as directed. You may need to stop some taking some blood thinners prior to surgery. Your specific medications will be discussed with you prior going home.  Incision Care     Your incision sites may have some bruising and minor swelling for about one week.    If your incision is sealed with Dermabond (glue) you may shower and leave the incision open to air the following day. When showering do not rub incision, let the warm soapy water run over it and pat dry. DO NOT SOAK INCISION IN A TUB/POOL/Etc until healed. Keep incisions dry and  covered until they begin to heal.   Restrictions     You may use the arm freely after the site heals. Keep the following in mind:     Avoid pressure on the arm, lifting heavy objects with the arm, sleeping on the arm with the graft or fistula, and wearing tight-sleeved shirts or jewelry around the graft or fistula.     Do not allow blood pressure monitoring or needle punctures (other than dialysis) on the side where the graft or fistula is located.    Fistula/Graft Use - Fistulas need to be assessed at a follow up appointment with your surgeon prior to it being used for dialysis.   Follow-Up  If a follow up appointment was not scheduled prior to your procedure please call [phone] Follow up appointments are scheduled with either your Physician or one a member of our care team.   Risks and Possible Complications     Steal  Syndrome - You may initially feel some coolness or numbness in the hand with the fistula. These sensations usually go away in a few weeks as your circulation compensates for the fistula. However, if these sensations are severe or don't disappear, tell your physician as soon as possible, because the fistula may be causing too much blood to flow away from your hand, a condition physicians call a  steal.     Infection/ Drainage/Bleeding/ Excessive Swelling - If there is any drainage or bleeding it should be a very small amount. If you have excessive bleeding, any milky drainage, or redness from the incisions call your doctor right away. Minor swelling is normal, if your experience excessive swelling call right away.     Graft/Fistula Narrowing or Occlusion - Grafts and fistulas may develop thrombosis or stenosis, essentially blocking or partially blocking blood flow within the created graft or fistula. To assess blood flow place your fingers over the graft or fistula and feel for a vibration or a  thrill . THIS SHOULD BE DONE EVERY DAY! IF THRILL CANNOT BE FELT PLEASE CONTACT YOUR SURGEON AS SOON  AS POSSIBLE.      Exercising for your fistula after surgery    An AV fistula must mature for several weeks or months before it can be used for hemodialysis, so after it is surgically created, your doctor will ask you to work on strengthening it. The more access arm exercises you do to help strengthen it, the sooner you ll be able to use your fistula. Your doctor may recommend certain arm and finger exercises that will strengthen the fistula. The exercises your doctor recommends will depend on where your fistula is located. Fistulas are usually located in the forearm or upper arm. Before you start any exercise, it s important to consult your doctor.    Use your arm for all of your usual activities. After seven days from the date of your surgery apply a tourniquet above your elbow tight enough to make the veins distend but not so tight that your hand goes to sleep or becomes extremely uncomfortable. Leave the tourniquet on for twenty to thirty minutes at a time, four times a day. Keep doing this until the fistula is used for dialysis.      Typically recommend 10-15 minutes 3-4 times a day.    Keeping your fistula clean  Once your AV fistula is strong enough to be used for hemodialysis, it is crucial that you keep it clean. Although a fistula is less prone to infection than other dialysis types, proper hygiene is still important:    Look for redness or swelling around the fistula area.     If you experience any pain in the fistula area, tell your doctor immediately.     If you get a fever, this can be a sign of infection.     Wash and pat dry your fistula arm thoroughly right before each treatment. Your dialysis facility will provide you with supplies.

## 2021-12-14 NOTE — PROGRESS NOTES
Patient is in clinic today to see MD Crystal Joya.      Patient is here for a consult to discuss Dialysis .    The patient does smoke.    Pt is currently taking ASA.    The patient states he/she are wearing compression.    Swelling is observed in bilateral legs.    Pt rates pain 0/10 located at ***.    Pts symptoms include swelling in Bilateral  extremity.    The provider has been notified that the patient has no complaints.

## 2022-01-17 DIAGNOSIS — I16.0 HYPERTENSIVE URGENCY: ICD-10-CM

## 2022-01-17 DIAGNOSIS — N17.9 ACUTE RENAL FAILURE SUPERIMPOSED ON CHRONIC KIDNEY DISEASE, UNSPECIFIED CKD STAGE, UNSPECIFIED ACUTE RENAL FAILURE TYPE: ICD-10-CM

## 2022-01-17 DIAGNOSIS — N18.9 ACUTE RENAL FAILURE SUPERIMPOSED ON CHRONIC KIDNEY DISEASE, UNSPECIFIED CKD STAGE, UNSPECIFIED ACUTE RENAL FAILURE TYPE: ICD-10-CM

## 2022-01-17 RX ORDER — CARVEDILOL 25 MG/1
25 TABLET ORAL 2 TIMES DAILY WITH MEALS
Qty: 60 TABLET | Refills: 1 | Status: SHIPPED | OUTPATIENT
Start: 2022-01-17 | End: 2022-03-22

## 2022-01-17 RX ORDER — MULTIVIT WITH MINERALS/LUTEIN
250 TABLET ORAL DAILY
Qty: 30 TABLET | Refills: 1 | Status: SHIPPED | OUTPATIENT
Start: 2022-01-17 | End: 2022-06-14

## 2022-01-17 RX ORDER — DOXAZOSIN 1 MG/1
1 TABLET ORAL EVERY 12 HOURS
Qty: 30 TABLET | Refills: 1 | Status: ON HOLD | OUTPATIENT
Start: 2022-01-17 | End: 2022-04-29

## 2022-01-17 NOTE — TELEPHONE ENCOUNTER
Reason for Call:  Medication or medication refill:    Do you use a [facility]?  Name of the pharmacy and phone number for the current request:    [id number] - [facility], [place] - [address] [place]    Name of the medication requested:   carvedilol (COREG) 25 MG tablet     doxazosin (CARDURA) 1 MG tablet       vitamin C (ASCORBIC ACID) 250 MG TABS tablet      Other request: na    Can we leave a detailed message on this number? YES    Phone number patient can be reached at: Cell number on file:    Telephone Information:   Mobile [phone]       Best Time: any    Call taken on 1/17/2022 at 11:31 AM by AMIRA JUNIOR

## 2022-01-22 ENCOUNTER — HEALTH MAINTENANCE LETTER (OUTPATIENT)
Age: 37
End: 2022-01-22

## 2022-01-31 DIAGNOSIS — N17.9 ACUTE RENAL FAILURE SUPERIMPOSED ON CHRONIC KIDNEY DISEASE, UNSPECIFIED CKD STAGE, UNSPECIFIED ACUTE RENAL FAILURE TYPE: ICD-10-CM

## 2022-01-31 DIAGNOSIS — N18.9 ACUTE RENAL FAILURE SUPERIMPOSED ON CHRONIC KIDNEY DISEASE, UNSPECIFIED CKD STAGE, UNSPECIFIED ACUTE RENAL FAILURE TYPE: ICD-10-CM

## 2022-01-31 NOTE — TELEPHONE ENCOUNTER
Reason for Call:  Medication or medication refill:    Do you use a [facility]?  Name of the pharmacy and phone number for the current request:    [id number] - [facility], [place] - [address] AT [address] [place]    Name of the medication requested:      calcitRIOL (ROCALTROL) 0.25 MCG capsule         Other request: na    Can we leave a detailed message on this number? YES    Phone number patient can be reached at: Home number on file [phone] (home)    Best Time: any    Call taken on 1/31/2022 at 1:47 PM by AMIRA JUNIOR

## 2022-02-01 RX ORDER — CALCITRIOL 0.25 UG/1
0.25 CAPSULE, LIQUID FILLED ORAL
Qty: 30 CAPSULE | Refills: 1 | OUTPATIENT
Start: 2022-02-02

## 2022-03-07 ENCOUNTER — TRANSFERRED RECORDS (OUTPATIENT)
Dept: HEALTH INFORMATION MANAGEMENT | Facility: CLINIC | Age: 37
End: 2022-03-07
Payer: COMMERCIAL

## 2022-03-07 LAB — RETINOPATHY: POSITIVE

## 2022-03-11 ENCOUNTER — TELEPHONE (OUTPATIENT)
Dept: FAMILY MEDICINE | Facility: CLINIC | Age: 37
End: 2022-03-11
Payer: COMMERCIAL

## 2022-03-11 NOTE — TELEPHONE ENCOUNTER
Incoming request from [facility] for new Rx's for meter and testing supplies. Insurance this year covers Accu-check or One Touch.     Pt has not been seen since sept. Needs an appt. Please schedule and route back if limited refill needed

## 2022-03-11 NOTE — TELEPHONE ENCOUNTER
1st attempt: LVM for patient to call us back to get scheduled for an in person medication check visit for talking about refilling supplies for diabetic things.

## 2022-03-15 ENCOUNTER — TELEPHONE (OUTPATIENT)
Dept: VASCULAR SURGERY | Facility: CLINIC | Age: 37
End: 2022-03-15
Payer: COMMERCIAL

## 2022-03-15 NOTE — TELEPHONE ENCOUNTER
Received a duplicate referral for AVF eval. Patient was seen for vein mapping and VV back in Dec with Dr. Joya; do we need to repeat imaging and follow up or OK to schedule surgery if patient decides to do so?

## 2022-03-21 DIAGNOSIS — I16.0 HYPERTENSIVE URGENCY: ICD-10-CM

## 2022-03-22 ENCOUNTER — TRANSFERRED RECORDS (OUTPATIENT)
Dept: HEALTH INFORMATION MANAGEMENT | Facility: CLINIC | Age: 37
End: 2022-03-22
Payer: COMMERCIAL

## 2022-03-22 LAB — RETINOPATHY: POSITIVE

## 2022-03-22 RX ORDER — CARVEDILOL 25 MG/1
25 TABLET ORAL 2 TIMES DAILY WITH MEALS
Qty: 180 TABLET | Refills: 1 | Status: SHIPPED | OUTPATIENT
Start: 2022-03-22

## 2022-03-22 NOTE — TELEPHONE ENCOUNTER
"Last Written Prescription Date:  1/17/2022  Last Fill Quantity: 60,  # refills: 1   Last office visit provider:  9/3/2021     Requested Prescriptions   Pending Prescriptions Disp Refills     carvedilol (COREG) 25 MG tablet 60 tablet 1     Sig: Take 1 tablet (25 mg) by mouth 2 times daily (with meals)       Beta-Blockers Protocol Passed - 3/21/2022  5:27 PM        Passed - Blood pressure under 140/90 in past 12 months     BP Readings from Last 3 Encounters:   12/14/21 136/78   09/06/21 (!) 147/88   09/03/21 (!) 213/117                 Passed - Patient is age 6 or older        Passed - Recent (12 mo) or future (30 days) visit within the authorizing provider's specialty     Patient has had an office visit with the authorizing provider or a provider within the authorizing providers department within the previous 12 mos or has a future within next 30 days. See \"Patient Info\" tab in inbasket, or \"Choose Columns\" in Meds & Orders section of the refill encounter.              Passed - Medication is active on med list             Magdalena Chauhan RN 03/22/22 3:13 PM  "

## 2022-03-28 ENCOUNTER — DOCUMENTATION ONLY (OUTPATIENT)
Dept: VASCULAR SURGERY | Facility: CLINIC | Age: 37
End: 2022-03-28

## 2022-03-28 ENCOUNTER — VIRTUAL VISIT (OUTPATIENT)
Dept: VASCULAR SURGERY | Facility: CLINIC | Age: 37
End: 2022-03-28
Attending: SURGERY
Payer: COMMERCIAL

## 2022-03-28 DIAGNOSIS — N18.9 CHRONIC KIDNEY DISEASE, UNSPECIFIED CKD STAGE: Primary | ICD-10-CM

## 2022-03-28 DIAGNOSIS — Z11.59 ENCOUNTER FOR SCREENING FOR OTHER VIRAL DISEASES: Primary | ICD-10-CM

## 2022-03-28 PROCEDURE — 99213 OFFICE O/P EST LOW 20 MIN: CPT | Mod: 95 | Performed by: SURGERY

## 2022-03-28 RX ORDER — CEFAZOLIN SODIUM/WATER 2 G/20 ML
2 SYRINGE (ML) INTRAVENOUS
Status: CANCELLED | OUTPATIENT
Start: 2022-04-13

## 2022-03-28 RX ORDER — CEFAZOLIN SODIUM/WATER 2 G/20 ML
2 SYRINGE (ML) INTRAVENOUS SEE ADMIN INSTRUCTIONS
Status: CANCELLED | OUTPATIENT
Start: 2022-04-13

## 2022-03-28 NOTE — PATIENT INSTRUCTIONS
Alysia    Your visit to [facility] for your surgery is coming soon and we look forward to seeing you! This friendly reminder and pre-procedure checklist will help to ensure your surgery goes smoothly and meets your expectations. At [facility], our goal is to provide you with a great patient experience and to deliver genuine, professional care to every patient.     Please complete all the steps in advance of your visit. If you have any questions about the items listed below, please give our office a call. We can be reached at [phone] or visit our website at [email or web address]  for more information.     Procedure: CREATION, ARTERIOVENOUS FISTULA, UPPER EXTREMITY     Procedure Date :  TBD    Arrival Time:  Surgery nurse will call you 1-2 days before surgery to tell you time of arrival     Procedure Location: [facility]:  [address] [place] [address] (phone: [phone], Fax: [phone])    Surgeon: MD Sobeida Joya    Admission Type: Outpatient    COVID PCR Test: TBD    Post Operative Appointment with Dr Farrukh Joya: TBD      If you take blood thinners:   PLEASE DO NOT STOP YOUR ASPIRIN OR PLAVIX UNLESS SPECIFICALLY DIRECTED BY THE VASCULAR SURGEON TO STOP!  - In most cases Vascular surgeons want you to continue these. This is different from most NON vascular surgeries and may not be well known by your Primary Care Provider    Notify our office right away, if you have any changes in your health status, or if you develop a cold, flu, diarrhea, infection, fever or sore throat before your scheduled surgery date.   We can be reached at [phone] Monday-Friday 8 am-4:30 pm if you have any questions.       Complete the following checklist before your surgery    []  You will need a Pre-op Physical within 30 days before surgery with your primary care provider. This exam is required by the anesthesiologist  "to ensure a safe surgical experience.      Failure  to obtain your pre-op physical will lead to cancellation of your surgery    Call them right away to schedule this. Please ensure your Preoperative Physical is faxed to the Hospital if done outside of [facility] system.     [] Preoperative Medication Instructions    Contact your prescribing provider and/or vascular surgeon for instructions before discontinuing any medications especially anticoagulants. (Examples: Coumadin, Plavix, Xarelto, Eliquis, Pradaxa, Effient, Brilinta)     Hold Ibuprofen, Herbal Supplements and Vitamin E 7 days before    Stop Cialis, Levitra and Viagra 2-3 days before surgery    [] Fasting Requirements    Nothing to eat for 8 hours before surgery unless instructed differently by the surgery nurse.     You may have clear liquids up to two hours before your arrival time (coffee, tea, water, or Gatorade. No cream or milk)    If your primary care provider has instructed you to continue oral medications, you may take them on the morning of surgery with a small sip of water.      No alcohol or smoking after 12:00am the day of surgery    [] PCR COVID-19 test is required within 4 days of surgery    If your test is positive or you fail to get tested, your surgery will be postponed.     [] Contact your insurance regarding coverage    If you would like a Good [facility] Estimate for your upcoming procedure at [facility] Location, contact Cost of Care Estimates     Advocates are available Monday through Friday 8am - 5pm   [phone]    You may also submit a request online at [email or web address]  - Complete the secure online form found under \"Services and Procedure Pricing\"     If your procedure is at [facility] please contact the numbers below for Cost of Care Estimates.   - Facility Charge: [phone]    Anesthesiology charge:  [phone]      [] DO NOT BRING FMLA WITH TO SURGERY.  These should be sent to the " provider's office by fax to [phone].     [] Day of Surgery    Medications - Take as indicated with sips of water.     Wear comfortable loose fitting clothes. Wear your glasses-Not contacts. Do not wear jewelry and remove body piercing's. Surgery may be cancelled if they are not removed.     You may have 1 family member wait in the lobby during your surgery. Visitor restrictions are subject to change. Please verify with the surgery nurse when they call.     If same day surgery-Have a someone come with you to surgery that can help you understand the surgeon's instructions, drive you home and stay with you overnight the first night.    [] If the community sees a new COVID-19 surge, your procedure may need to be postponed. We will contact you if this happens.    [] You will receive a call from a surgery nurse 1-3 days prior to surgery. They will go over more details with you.         Testing for COVID-19 (Coronavirus) Before Your Procedure or Hospital Admission    Thank you for choosing [facility] for your health care needs. The COVID-19 pandemic is a very challenging time for everyone. Our goal is to keep you and our team here at [facility] safe and healthy.       Before you come in, All patients must get tested for COVID-19. Your test needs to happen 2 to 4 days before you check in to the hospital or surgery site.    Note: If you go to a clinic or pharmacy like [facility] or [facility] for your test, make sure you get a test inside the nose. Tests inside the nose are:  - A naso-pharyngeal (NP) RT-PCR test  - An anterior nares RT-PCR test    Do NOT get a  rapid  test or a saliva (spit) test.    After the test, please stay at home and stay out of contact with other people. It will be harder for you to recover if you get COVID-19 before your treatment.    Please follow all current safety guidelines, including:    Limit trips outside your home.    Limit the number of people you see.    Always wear a mask  outside your home.    Use social distancing. Stay 6 feet away from others whenever you can.    Wash your hands often.    If your test shows you have COVID-19  If your test is positive, we ll let you know. A positive test means that you have the virus.  We ll probably have to postpone your admission, surgery or procedure. Your doctor will discuss this with you. After that, we ll let you know what to do and when you can re-schedule.  We may need to cancel your treatment on short notice for other reasons, too.    If your test shows you DON T have COVID-19  Even if your test is negative, you can still get COVID-19. It s rare but, sometimes, the test result is wrong. You could also catch the virus after taking the test.  There s a very small chance that you could catch COVID-19 in the hospital or surgery center.    Day of your surgery or procedure    Please come wearing a face covering that covers both your nose and mouth.    When you arrive, we ll ask you some questions to find out if you have any signs or symptoms of COVID-19.    Ask your care team if you can have visitors. All visitors must wear face coverings and will be screened for signs of COVID-19.    Even if no visitors are allowed, you can still have with you:  - Your legal guardian or legal decision maker  - A parent and one other visitor, if you are  younger than 18 years old  - A partner and a , if you are in labor    We might need to teach you about taking care of yourself after surgery. If so, a visitor can come into the hospital to learn about it, too.    The rules for visitors change often, depending on how much the virus is spreading. To learn more, see Visiting a Loved One in the Hospital during the COVID-19 Outbreak. Please call your care team, hospital or surgery center if you have any questions. We thank you for your understanding and for choosing [facility] for your care.    Questions and Answers  Does it matter where I get tested for  COVID-19?  Yes. We urge you to get tested at one of our [facility] COVID-19 testing sites. We process these tests in our lab and can get the results quickly. Your [facility] care team needs to get your results before you check in.    What should I do if I can t get tested at [facility]?  You can get tested somewhere else, but you ll need to take these extra steps:  1. Contact your family doctor or clinic to arrange your test.  2. Take the test within 4 days of your surgery or procedure. We can t accept tests older than 4 days.  3. Make sure you re getting a test inside the nose.  Tests inside the nose are:  - A naso-pharyngeal (NP) RT-PCR test  - An anterior nares RT-PCR test  -Many pharmacies use  rapid  tests or saliva (spit) tests. We do NOT accept those tests before surgery or procedures. Tests from inside the nose are the most accurate tests.  4. Make sure your doctor or clinic faxes your results to [facility] at [phone].    If we don t get your results in time, we may have to delay or cancel your treatment.      For informational purposes only. Not to replace the advice of your health care provider. Copyright   2020 [facility]. All rights reserved. Clinically reviewed by Infection Prevention and the [place] [address] Clinical Team.  [employer] 465966 - Rev 09/09/21.    What to Expect After Your Dialysis Access Surgery  Hospital Stay    You can expect to go home the same day as your surgery.    Medications    Take all medications exactly as directed. You may need to stop some taking some blood thinners prior to surgery. Your specific medications will be discussed with you prior going home.    Incision Care     Your incision sites may have some bruising and minor swelling for about one week.    If your incision is sealed with Dermabond (glue) you may shower and leave the incision open to air the following day. When showering do not rub incision, let the  warm soapy water run over it and pat dry. DO NOT SOAK INCISION IN A TUB/POOL/Etc until healed. Keep incisions dry and covered until they begin to heal.     Restrictions     You may use the arm freely after the site heals. Keep the following in mind:     Avoid pressure on the arm, lifting heavy objects with the arm, sleeping on the arm with the graft or fistula, and wearing tight-sleeved shirts or jewelry around the graft or fistula.     Do not allow blood pressure monitoring or needle punctures (other than dialysis) on the side where the graft or fistula is located.    Fistula/Graft Use - Fistulas need to be assessed at a follow up appointment with your surgeon prior to it being used for dialysis.     Follow-Up  If a follow up appointment was not scheduled prior to your procedure please call [phone]. Follow up appointments are scheduled with either your Physician or one a member of our care team.     Risks and Possible Complications     Steal  Syndrome - You may initially feel some coolness or numbness in the hand with the fistula. These sensations usually go away in a few weeks as your circulation compensates for the fistula. However, if these sensations are severe or don't disappear, tell your physician as soon as possible, because the fistula may be causing too much blood to flow away from your hand, a condition physicians call a  steal.     Infection/ Drainage/Bleeding/ Excessive Swelling - If there is any drainage or bleeding it should be a very small amount. If you have excessive bleeding, any milky drainage, or redness from the incisions call your doctor right away. Minor swelling is normal, if your experience excessive swelling call right away.     Graft/Fistula Narrowing or Occlusion - Grafts and fistulas may develop thrombosis or stenosis, essentially blocking or partially blocking blood flow within the created graft or fistula. To assess blood flow place your fingers over the graft or fistula and  feel for a vibration or a  thrill .   THIS SHOULD BE DONE EVERY DAY! IF THRILL CANNOT BE FELT PLEASE CONTACT YOUR SURGEON AS SOON AS POSSIBLE.          Creating a Hemodialysis Access  Before hemodialysis can be done, a way for blood to leave and return to your body (an access) is needed. During hemodialysis, needles placed into the access carry blood to and from the dialyzer. A hemodialysis access is often made in your arm. The 2 main types of accesses are an arteriovenous fistula (AV fistula) and an arteriovenous graft (AV graft).   Making your access  The hemodialysis access provides a large volume of fast flowing blood. It involves surgery under anesthesia. You may be able to go home the same day. It is made during a short procedure using one of these methods:     A fistula is made by linking an artery to a nearby vein. The high pressure and blood flow in the artery are transferred into the vein. They help the vein grow in size and thickness. The enlarged vein (fistula) eventually has high blood flow. It becomes thick enough for needles to be placed safely several times each week during hemodialysis. It may need weeks or months to develop before it's ready to be used. A fistula works better than the graft. It also has fewer long-term problems. It is the preferred form of access.     Fistula     A graft (piece of synthetic tube) may be sewn between an artery and a vein. It's often used if a fistula is not possible because of the small size of your veins. Blood flows quickly through the graft from the artery to the vein. A graft is often ready to use in a few weeks. Needles can be placed into the plastic tube to get blood during dialysis.     Graft   Both types of access may take weeks to months before they can be used. If dialysis is needed right away, a short-term venous catheter is used. A catheter that allows two-way blood flow is placed into a large vein. The dialysis tubing is then connected to the catheter.  If both the AV fistula and graft don't work, a more permanent venous catheter is used.   The most common complications for hemodialysis access are:     Infection    Clotting    Decreased blood flow from clotting or other narrowing  [employer]ia last reviewed this educational content on 4/1/2020 2000-2021 [employer]. All rights reserved. This information is not intended as a substitute for professional medical care. Always follow your healthcare professional's instructions.      Exercising for your fistula after surgery    An AV fistula must mature for several weeks or months before it can be used for hemodialysis, so after it is surgically created, your doctor will ask you to work on strengthening it. The more access arm exercises you do to help strengthen it, the sooner you ll be able to use your fistula. Your doctor may recommend certain arm and finger exercises that will strengthen the fistula. The exercises your doctor recommends will depend on where your fistula is located. Fistulas are usually located in the forearm or upper arm. Before you start any exercise, it s important to consult your doctor.    Use your arm for all of your usual activities. After seven days from the date of your surgery apply a tourniquet above your elbow tight enough to make the veins distend but not so tight that your hand goes to sleep or becomes extremely uncomfortable. Leave the tourniquet on for twenty to thirty minutes at a time, four times a day. Keep doing this until the fistula is used for dialysis.      Typically recommend 10-15 minutes 3-4 times a day.    Keeping your fistula clean  Once your AV fistula is strong enough to be used for hemodialysis, it is crucial that you keep it clean. Although a fistula is less prone to infection than other dialysis types, proper hygiene is still important:    Look for redness or swelling around the fistula area.     If you experience any pain in the fistula area, tell your  doctor immediately.     If you get a fever, this can be a sign of infection.     Wash and pat dry your fistula arm thoroughly right before each treatment. Your dialysis facility will provide you with supplies.      What to Expect After Your Dialysis Access Surgery  Hospital Stay    You can expect to go home the same day as your surgery.  Medications    Take all medications exactly as directed. You may need to stop some taking some blood thinners prior to surgery. Your specific medications will be discussed with you prior going home.  Incision Care     Your incision sites may have some bruising and minor swelling for about one week.    If your incision is sealed with Dermabond (glue) you may shower and leave the incision open to air the following day. When showering do not rub incision, let the warm soapy water run over it and pat dry. DO NOT SOAK INCISION IN A TUB/POOL/Etc until healed. Keep incisions dry and covered until they begin to heal.   Restrictions     You may use the arm freely after the site heals. Keep the following in mind:     Avoid pressure on the arm, lifting heavy objects with the arm, sleeping on the arm with the graft or fistula, and wearing tight-sleeved shirts or jewelry around the graft or fistula.     Do not allow blood pressure monitoring or needle punctures (other than dialysis) on the side where the graft or fistula is located.    Fistula/Graft Use - Fistulas need to be assessed at a follow up appointment with your surgeon prior to it being used for dialysis.   Follow-Up  If a follow up appointment was not scheduled prior to your procedure please call [phone] Follow up appointments are scheduled with either your Physician or one a member of our care team.   Risks and Possible Complications     Steal  Syndrome - You may initially feel some coolness or numbness in the hand with the fistula. These sensations usually go away in a few weeks as your circulation compensates for the fistula.  However, if these sensations are severe or don't disappear, tell your physician as soon as possible, because the fistula may be causing too much blood to flow away from your hand, a condition physicians call a  steal.     Infection/ Drainage/Bleeding/ Excessive Swelling - If there is any drainage or bleeding it should be a very small amount. If you have excessive bleeding, any milky drainage, or redness from the incisions call your doctor right away. Minor swelling is normal, if your experience excessive swelling call right away.     Graft/Fistula Narrowing or Occlusion - Grafts and fistulas may develop thrombosis or stenosis, essentially blocking or partially blocking blood flow within the created graft or fistula. To assess blood flow place your fingers over the graft or fistula and feel for a vibration or a  thrill . THIS SHOULD BE DONE EVERY DAY! IF THRILL CANNOT BE FELT PLEASE CONTACT YOUR SURGEON AS SOON AS POSSIBLE.      Exercising for your fistula after surgery    An AV fistula must mature for several weeks or months before it can be used for hemodialysis, so after it is surgically created, your doctor will ask you to work on strengthening it. The more access arm exercises you do to help strengthen it, the sooner you ll be able to use your fistula. Your doctor may recommend certain arm and finger exercises that will strengthen the fistula. The exercises your doctor recommends will depend on where your fistula is located. Fistulas are usually located in the forearm or upper arm. Before you start any exercise, it s important to consult your doctor.    Use your arm for all of your usual activities. After seven days from the date of your surgery apply a tourniquet above your elbow tight enough to make the veins distend but not so tight that your hand goes to sleep or becomes extremely uncomfortable. Leave the tourniquet on for twenty to thirty minutes at a time, four times a day. Keep doing this until the  fistula is used for dialysis.      Typically recommend 10-15 minutes 3-4 times a day.    Keeping your fistula clean  Once your AV fistula is strong enough to be used for hemodialysis, it is crucial that you keep it clean. Although a fistula is less prone to infection than other dialysis types, proper hygiene is still important:    Look for redness or swelling around the fistula area.     If you experience any pain in the fistula area, tell your doctor immediately.     If you get a fever, this can be a sign of infection.     Wash and pat dry your fistula arm thoroughly right before each treatment. Your dialysis facility will provide you with supplies.

## 2022-03-28 NOTE — PROGRESS NOTES
Surgery Scheduled    Surgery/Procedure:   CREATION, ARTERIOVENOUS FISTULA, UPPER EXTREMITY (Left)     Special Equipment: Sonosite    Location: [facility]:  [facility] [place] [id number] (phone: [phone], Fax: [phone])    Date: 4/13/22    Time: 1:30PM    Admission Type: Inpatient    Surgeon: Dr. Joya    OR Confirmed & :  Yes with Jessica on 3/29/2022    Entered on provider calendar:  Yes    Pre-op - 4/4/22    Covid Scheduled: 4/11/22 4:00PM Mplw    Post Op: TBD - Pt is requesting a video visit. Routing to [facility] to see if that is possible.    Blood Thinners Addressed: No

## 2022-03-28 NOTE — NURSING NOTE
[facility]        Patient is here for a  follow up  to discuss AVF placement     Pt is currently taking Aspirin.    There were no vitals taken for this visit.    The provider has been notified that the patient has concerns of wanting AVF placed.     Questions patient would like addressed today are: How long does the procedure take? Will I have to be put to sleep?    Refills are needed: N/A    Has homecare services and agency name:  Dayami LITTLE RN

## 2022-03-28 NOTE — PROGRESS NOTES
36-year-old female who is scheduled for billable video visit.  It started at 340 and ended at 4 PM.  Patient would like to proceed with left upper semifistula creation.  We will move forward with operation.  Risk and benefits of this operation were explained, patient agreed to proceed.

## 2022-04-01 NOTE — PROGRESS NOTES
Spoke with Alysia regarding surgery. She has everything scheduled (pre-op and covid test) and discussed that she should be seen in clinic for follow up post surgery. Follow up day and time given and she is okay with this. She is okay receiving her written instructions via [employer].

## 2022-04-04 ENCOUNTER — NURSE TRIAGE (OUTPATIENT)
Dept: NURSING | Facility: CLINIC | Age: 37
End: 2022-04-04

## 2022-04-04 ENCOUNTER — OFFICE VISIT (OUTPATIENT)
Dept: FAMILY MEDICINE | Facility: CLINIC | Age: 37
End: 2022-04-04
Payer: COMMERCIAL

## 2022-04-04 VITALS
HEART RATE: 80 BPM | DIASTOLIC BLOOD PRESSURE: 82 MMHG | HEIGHT: 60 IN | WEIGHT: 190 LBS | SYSTOLIC BLOOD PRESSURE: 142 MMHG | OXYGEN SATURATION: 98 % | BODY MASS INDEX: 37.3 KG/M2

## 2022-04-04 DIAGNOSIS — Z01.818 PREOP GENERAL PHYSICAL EXAM: Primary | ICD-10-CM

## 2022-04-04 DIAGNOSIS — Z11.4 SCREENING FOR HIV (HUMAN IMMUNODEFICIENCY VIRUS): ICD-10-CM

## 2022-04-04 DIAGNOSIS — Z11.59 NEED FOR HEPATITIS C SCREENING TEST: ICD-10-CM

## 2022-04-04 DIAGNOSIS — E11.21 TYPE 2 DIABETES MELLITUS WITH DIABETIC NEPHROPATHY, WITHOUT LONG-TERM CURRENT USE OF INSULIN (H): ICD-10-CM

## 2022-04-04 DIAGNOSIS — F17.200 SMOKING: ICD-10-CM

## 2022-04-04 DIAGNOSIS — R06.83 SNORING: ICD-10-CM

## 2022-04-04 DIAGNOSIS — N18.5 CKD (CHRONIC KIDNEY DISEASE) STAGE 5, GFR LESS THAN 15 ML/MIN (H): ICD-10-CM

## 2022-04-04 DIAGNOSIS — Z13.220 SCREENING FOR HYPERLIPIDEMIA: ICD-10-CM

## 2022-04-04 DIAGNOSIS — H35.033 HYPERTENSIVE RETINOPATHY OF BOTH EYES: ICD-10-CM

## 2022-04-04 LAB
ALBUMIN SERPL-MCNC: 3.2 G/DL (ref 3.5–5)
ALP SERPL-CCNC: 52 U/L (ref 45–120)
ALT SERPL W P-5'-P-CCNC: <9 U/L (ref 0–45)
ANION GAP SERPL CALCULATED.3IONS-SCNC: 14 MMOL/L (ref 5–18)
AST SERPL W P-5'-P-CCNC: 8 U/L (ref 0–40)
BASOPHILS # BLD AUTO: 0.1 10E3/UL (ref 0–0.2)
BASOPHILS NFR BLD AUTO: 1 %
BILIRUB SERPL-MCNC: 0.4 MG/DL (ref 0–1)
BUN SERPL-MCNC: 50 MG/DL (ref 8–22)
CALCIUM SERPL-MCNC: 6.9 MG/DL (ref 8.5–10.5)
CHLORIDE BLD-SCNC: 106 MMOL/L (ref 98–107)
CHOLEST SERPL-MCNC: 153 MG/DL
CO2 SERPL-SCNC: 18 MMOL/L (ref 22–31)
CREAT SERPL-MCNC: 7.72 MG/DL (ref 0.6–1.1)
CREAT UR-MCNC: 57 MG/DL
EOSINOPHIL # BLD AUTO: 1 10E3/UL (ref 0–0.7)
EOSINOPHIL NFR BLD AUTO: 9 %
ERYTHROCYTE [DISTWIDTH] IN BLOOD BY AUTOMATED COUNT: 16.8 % (ref 10–15)
FASTING STATUS PATIENT QL REPORTED: YES
GFR SERPL CREATININE-BSD FRML MDRD: 6 ML/MIN/1.73M2
GLUCOSE BLD-MCNC: 75 MG/DL (ref 70–125)
HBA1C MFR BLD: 5 % (ref 0–5.6)
HCT VFR BLD AUTO: 28.9 % (ref 35–47)
HDLC SERPL-MCNC: 30 MG/DL
HGB BLD-MCNC: 8.7 G/DL (ref 11.7–15.7)
HIV 1+2 AB+HIV1 P24 AG SERPL QL IA: NEGATIVE
IMM GRANULOCYTES # BLD: 0 10E3/UL
IMM GRANULOCYTES NFR BLD: 0 %
LDLC SERPL CALC-MCNC: 107 MG/DL
LYMPHOCYTES # BLD AUTO: 1.1 10E3/UL (ref 0.8–5.3)
LYMPHOCYTES NFR BLD AUTO: 10 %
MCH RBC QN AUTO: 25.3 PG (ref 26.5–33)
MCHC RBC AUTO-ENTMCNC: 30.1 G/DL (ref 31.5–36.5)
MCV RBC AUTO: 84 FL (ref 78–100)
MICROALBUMIN UR-MCNC: 310.1 MG/DL (ref 0–1.99)
MICROALBUMIN/CREAT UR: 5440.4 MG/G CR
MONOCYTES # BLD AUTO: 0.9 10E3/UL (ref 0–1.3)
MONOCYTES NFR BLD AUTO: 9 %
NEUTROPHILS # BLD AUTO: 7.4 10E3/UL (ref 1.6–8.3)
NEUTROPHILS NFR BLD AUTO: 71 %
PLATELET # BLD AUTO: 223 10E3/UL (ref 150–450)
POTASSIUM BLD-SCNC: 4.9 MMOL/L (ref 3.5–5)
PROT SERPL-MCNC: 7.3 G/DL (ref 6–8)
RBC # BLD AUTO: 3.44 10E6/UL (ref 3.8–5.2)
SODIUM SERPL-SCNC: 138 MMOL/L (ref 136–145)
TRIGL SERPL-MCNC: 79 MG/DL
WBC # BLD AUTO: 10.5 10E3/UL (ref 4–11)

## 2022-04-04 PROCEDURE — 36415 COLL VENOUS BLD VENIPUNCTURE: CPT | Performed by: FAMILY MEDICINE

## 2022-04-04 PROCEDURE — 90732 PPSV23 VACC 2 YRS+ SUBQ/IM: CPT | Performed by: FAMILY MEDICINE

## 2022-04-04 PROCEDURE — 99214 OFFICE O/P EST MOD 30 MIN: CPT | Mod: 25 | Performed by: FAMILY MEDICINE

## 2022-04-04 PROCEDURE — 82043 UR ALBUMIN QUANTITATIVE: CPT | Performed by: FAMILY MEDICINE

## 2022-04-04 PROCEDURE — 83036 HEMOGLOBIN GLYCOSYLATED A1C: CPT | Performed by: FAMILY MEDICINE

## 2022-04-04 PROCEDURE — 80053 COMPREHEN METABOLIC PANEL: CPT | Performed by: FAMILY MEDICINE

## 2022-04-04 PROCEDURE — 90471 IMMUNIZATION ADMIN: CPT | Performed by: FAMILY MEDICINE

## 2022-04-04 PROCEDURE — 87389 HIV-1 AG W/HIV-1&-2 AB AG IA: CPT | Performed by: FAMILY MEDICINE

## 2022-04-04 PROCEDURE — 85025 COMPLETE CBC W/AUTO DIFF WBC: CPT | Performed by: FAMILY MEDICINE

## 2022-04-04 PROCEDURE — 86803 HEPATITIS C AB TEST: CPT | Performed by: FAMILY MEDICINE

## 2022-04-04 PROCEDURE — 80061 LIPID PANEL: CPT | Performed by: FAMILY MEDICINE

## 2022-04-04 RX ORDER — CALCITRIOL 0.25 UG/1
0.25 CAPSULE, LIQUID FILLED ORAL
Qty: 30 CAPSULE | Refills: 1
Start: 2022-04-04

## 2022-04-04 RX ORDER — HYDRALAZINE HYDROCHLORIDE 50 MG/1
50 TABLET, FILM COATED ORAL 3 TIMES DAILY
Qty: 90 TABLET | Refills: 1 | Status: SHIPPED | OUTPATIENT
Start: 2022-04-04 | End: 2022-06-14

## 2022-04-04 RX ORDER — BLOOD-GLUCOSE METER
1 KIT MISCELLANEOUS DAILY
Qty: 1 KIT | Refills: 0 | Status: SHIPPED | OUTPATIENT
Start: 2022-04-04

## 2022-04-04 NOTE — PATIENT INSTRUCTIONS
Preparing for Your Surgery  Getting started  A nurse will call you to review your health history and instructions. They will give you an arrival time based on your scheduled surgery time. Please be ready to share:    Your doctor's clinic name and phone number    Your medical, surgical and anesthesia history    A list of allergies and sensitivities    A list of medicines, including herbal treatments and over-the-counter drugs    Whether the patient has a legal guardian (ask how to send us the papers in advance)  Please tell us if you're pregnant--or if there's any chance you might be pregnant. Some surgeries may injure a fetus (unborn baby), so they require a pregnancy test. Surgeries that are safe for a fetus don't always need a test, and you can choose whether to have one.   If you have a child who's having surgery, please ask for a copy of Preparing for Your Child's Surgery.    Preparing for surgery    Within 30 days of surgery: Have a pre-op exam (sometimes called an H&P, or History and Physical). This can be done at a clinic or pre-operative center.  ? If you're having a , you may not need this exam. Talk to your care team.    At your pre-op exam, talk to your care team about all medicines you take. If you need to stop any medicines before surgery, ask when to start taking them again.  ? We do this for your safety. Many medicines can make you bleed too much during surgery. Some change how well surgery (anesthesia) drugs work.    Call your insurance company to let them know you're having surgery. (If you don't have insurance, call [phone].)    Call your clinic if there's any change in your health. This includes signs of a cold or flu (sore throat, runny nose, cough, rash, fever). It also includes a scrape or scratch near the surgery site.    If you have questions on the day of surgery, call your hospital or surgery center.  COVID testing  You may need to be tested for COVID-19 before having  surgery. If so, your surgical team will give you instructions for scheduling this test, separate from your preoperative history and physical.  Eating and drinking guidelines  For your safety: Unless your surgeon tells you otherwise, follow the guidelines below.    Eat and drink as usual until 8 hours before surgery. After that, no food or milk.    Drink clear liquids until 2 hours before surgery. These are liquids you can see through, like water, Gatorade and Propel Water. You may also have black coffee and tea (no cream or milk).    Nothing by mouth within 2 hours of surgery. This includes gum, candy and breath mints.    If you drink alcohol: Stop drinking it the night before surgery.    If your care team tells you to take medicine on the morning of surgery, it's okay to take it with a sip of water.  Preventing infection    Shower or bathe the night before and morning of your surgery. Follow the instructions your clinic gave you. (If no instructions, use regular soap.)    Don't shave or clip hair near your surgery site. We'll remove the hair if needed.    Don't smoke or vape the morning of surgery. You may chew nicotine gum up to 2 hours before surgery. A nicotine patch is okay.  ? Note: Some surgeries require you to completely quit smoking and nicotine. Check with your surgeon.    Your care team will make every effort to keep you safe from infection. We will:  ? Clean our hands often with soap and water (or an alcohol-based hand rub).  ? Clean the skin at your surgery site with a special soap that kills germs.  ? Give you a special gown to keep you warm. (Cold raises the risk of infection.)  ? Wear special hair covers, masks, gowns and gloves during surgery.  ? Give antibiotic medicine, if prescribed. Not all surgeries need antibiotics.  What to bring on the day of surgery    Photo ID and insurance card    Copy of your health care directive, if you have one    Glasses and hearing aides (bring cases)  ? You can't  wear contacts during surgery    Inhaler and eye drops, if you use them (tell us about these when you arrive)    CPAP machine or breathing device, if you use them    A few personal items, if spending the night    If you have . . .  ? A pacemaker, ICD (cardiac defibrillator) or other implant: Bring the ID card.  ? An implanted stimulator: Bring the remote control.  ? A legal guardian: Bring a copy of the certified (court-stamped) guardianship papers.  Please remove any jewelry, including body piercings. Leave jewelry and other valuables at home.  If you're going home the day of surgery    You must have a responsible adult drive you home. They should stay with you overnight as well.    If you don't have someone to stay with you, and you aren't safe to go home alone, we may keep you overnight. Insurance often won't pay for this.  After surgery  If it's hard to control your pain or you need more pain medicine, please call your surgeon's office.  Questions?   If you have any questions for your care team, list them here: _________________________________________________________________________________________________________________________________________________________________________ ____________________________________ ____________________________________ ____________________________________  For informational purposes only. Not to replace the advice of your health care provider. Copyright   2003, 2019 [facility]. All rights reserved. Clinically reviewed by Laura Chinchilla MD. MEDSEEK [id number] - REV 07/21.    How to Take Your Medication Before Surgery  - Take all of your medications before surgery except as noted below  - HOLD (do not take) your LASIX (FUROSEMIDE) on the morning of surgery.   - HOLD (do not take) your GLIPIZIDE on the morning of surgery.   - HOLD (do not take) Aspirin for 7 DAYS ( CHECK WITH YOUR VASCULAR SURGEON) days  - STOP taking all vitamins and herbal supplements 14 days before  surgery.

## 2022-04-05 LAB — HCV AB SERPL QL IA: NONREACTIVE

## 2022-04-05 NOTE — TELEPHONE ENCOUNTER
"Received report of Critical lab - Creatinine 7.72mg/dL.  Lab was done as part of pre-op physical.           Conference call to Dr. Samson Wilkins via paging service at 9:17pm.   Spoke with provider. He said \"noted.\" and no action required at this time as pt is known to have ESRD.  Will route message as high priority as well to provider that ordered lab.    Stacy Goss RN   04/04/22 9:19 PM  [facility] Nurse Advisor  "

## 2022-04-06 ENCOUNTER — MYC MEDICAL ADVICE (OUTPATIENT)
Dept: FAMILY MEDICINE | Facility: CLINIC | Age: 37
End: 2022-04-06
Payer: COMMERCIAL

## 2022-04-06 DIAGNOSIS — N17.9 ACUTE RENAL FAILURE SUPERIMPOSED ON CHRONIC KIDNEY DISEASE, UNSPECIFIED CKD STAGE, UNSPECIFIED ACUTE RENAL FAILURE TYPE: ICD-10-CM

## 2022-04-06 DIAGNOSIS — E11.22 TYPE 2 DIABETES MELLITUS WITH CHRONIC KIDNEY DISEASE, WITHOUT LONG-TERM CURRENT USE OF INSULIN, UNSPECIFIED CKD STAGE (H): Primary | ICD-10-CM

## 2022-04-06 DIAGNOSIS — N18.5 CKD (CHRONIC KIDNEY DISEASE) STAGE 5, GFR LESS THAN 15 ML/MIN (H): ICD-10-CM

## 2022-04-06 DIAGNOSIS — N18.9 ACUTE RENAL FAILURE SUPERIMPOSED ON CHRONIC KIDNEY DISEASE, UNSPECIFIED CKD STAGE, UNSPECIFIED ACUTE RENAL FAILURE TYPE: ICD-10-CM

## 2022-04-06 RX ORDER — MULTIVIT WITH MINERALS/LUTEIN
250 TABLET ORAL DAILY
Qty: 30 TABLET | Refills: 1 | OUTPATIENT
Start: 2022-04-06

## 2022-04-07 NOTE — TELEPHONE ENCOUNTER
Please inform patient to check with Nephrologist if Vitamin C is okay? I do not think high dose of Vitamin C is recommended    Toby Howard MD

## 2022-04-08 NOTE — PROGRESS NOTES
Spoke with pt move surgery time. Surgery moved to 4/13 at 1:30PM.    Moved COVID PCR test to 4/11 to accommodate new surgery time.

## 2022-04-11 ENCOUNTER — LAB (OUTPATIENT)
Dept: LAB | Facility: CLINIC | Age: 37
End: 2022-04-11
Attending: SURGERY
Payer: COMMERCIAL

## 2022-04-11 DIAGNOSIS — Z11.59 ENCOUNTER FOR SCREENING FOR OTHER VIRAL DISEASES: ICD-10-CM

## 2022-04-11 PROCEDURE — U0005 INFEC AGEN DETEC AMPLI PROBE: HCPCS

## 2022-04-11 PROCEDURE — U0003 INFECTIOUS AGENT DETECTION BY NUCLEIC ACID (DNA OR RNA); SEVERE ACUTE RESPIRATORY SYNDROME CORONAVIRUS 2 (SARS-COV-2) (CORONAVIRUS DISEASE [COVID-19]), AMPLIFIED PROBE TECHNIQUE, MAKING USE OF HIGH THROUGHPUT TECHNOLOGIES AS DESCRIBED BY CMS-2020-01-R: HCPCS

## 2022-04-12 ENCOUNTER — TELEPHONE (OUTPATIENT)
Dept: FAMILY MEDICINE | Facility: CLINIC | Age: 37
End: 2022-04-12

## 2022-04-12 LAB — SARS-COV-2 RNA RESP QL NAA+PROBE: NEGATIVE

## 2022-04-12 NOTE — TELEPHONE ENCOUNTER
Central Prior Authorization Team   Phone: [phone]      EPA DENIED: WAITING ON DENIAL LETTER

## 2022-04-12 NOTE — TELEPHONE ENCOUNTER
Central Prior Authorization Team   Phone: [phone]      PRIOR AUTHORIZATION DENIED    Medication: Continuous Blood Gluc Sensor (FREESTYLE HARINI 2 SENSOR) MISC - EPA Denied     Denial Date: 4/12/2022    Denial Rational: The patient does not meet criteria for approval, approval may be considered: patients that require insulin, using multiple injections per day, OR when patient is using an insulin pump              Appeal Information:  If the provider would like to appeal this denial, please provide a letter of medical necessity. Please also include any therapies that the patient has tried and their outcomes. The patient's insurance company will also require the provider to address why the insurance preferred options are not appropriate in the patient's therapy.  The reason could be that the preferred options will harm the patient; either physically or mentally. They are contraindicated to the patient; or the patient has already been taking the requested medication and changing the therapy would change the outcome of their therapy.    Once it has been completed and placed in the patient's chart, notify me directly: carolann adam and the appeal can be initiated on behalf of the patient and provider.

## 2022-04-12 NOTE — LETTER
Physician Name: Toby Howard MD    Physician Address:  [place] [address] [facility] [address]  Phone: [phone]  Fax: [phone]  Fax: [phone]        4/12/2022    Patients Name: Alysia Hernandez  YOB: 1985  Payor: Payor: BCBS / Plan: BCBS OUT OF STATE / Product Type: Indemnity /    ID: [id number]    Name of person submitting request form: Toby Howard MD .    Reason for Request: Prior Authorization Drug    Requested Medication: Continues blood glucose sensor  Dosage, Quantity Requested: 2 (per month) and Length of Treatment 1 year.(please be specific)   Diagnosis: ESRD, type 2 diabetes mellitus, low hemoglobin,  Formulary Medications tried and when: (be specific and include detail (contraindications, allergies etc): Accu-Cheks in the past    Other Pertinant History: Patient with extremely low hemoglobin and hemoglobin A1c is not reliable to manage patient's medication.  She is now at end-stage renal disease.  Patient is unable to check multiple times a day for accuracy of her blood sugars to manage diabetes.  She will benefit from glucose sensor that does not require Accu-Cheks with needle and will greatly benefit from better control of her diabetes and long-term health.  She is already getting ready for dialysis.  Please help this patient by approving of continuous glucose monitoring system.    PRIOR AUTHORIZATION REPLY  ___ Approved   Begin date_____End date ______Approved by ______________    ___Formulary Alternative Available  ___P&T Med Dir Crit Not Met  ___ Pt has expanded formulary   ___ No benefit  ___Pending    ___More information needed  ___Denied    __________________________________________________________________________________________________________________________________________________________________________________________________________________

## 2022-04-13 ENCOUNTER — ANESTHESIA (OUTPATIENT)
Dept: SURGERY | Facility: HOSPITAL | Age: 37
End: 2022-04-13
Payer: COMMERCIAL

## 2022-04-13 ENCOUNTER — ANESTHESIA EVENT (OUTPATIENT)
Dept: SURGERY | Facility: HOSPITAL | Age: 37
End: 2022-04-13
Payer: COMMERCIAL

## 2022-04-13 ENCOUNTER — ANCILLARY PROCEDURE (OUTPATIENT)
Dept: ULTRASOUND IMAGING | Facility: HOSPITAL | Age: 37
End: 2022-04-13
Attending: ANESTHESIOLOGY
Payer: COMMERCIAL

## 2022-04-13 ENCOUNTER — HOSPITAL ENCOUNTER (OUTPATIENT)
Facility: HOSPITAL | Age: 37
Discharge: HOME | End: 2022-04-13
Attending: SURGERY | Admitting: SURGERY
Payer: COMMERCIAL

## 2022-04-13 VITALS
OXYGEN SATURATION: 94 % | DIASTOLIC BLOOD PRESSURE: 75 MMHG | SYSTOLIC BLOOD PRESSURE: 136 MMHG | RESPIRATION RATE: 20 BRPM | HEART RATE: 77 BPM | BODY MASS INDEX: 38.57 KG/M2 | WEIGHT: 197.5 LBS | TEMPERATURE: 97.8 F

## 2022-04-13 DIAGNOSIS — N18.9 CHRONIC KIDNEY DISEASE, UNSPECIFIED CKD STAGE: ICD-10-CM

## 2022-04-13 DIAGNOSIS — N18.5 CKD (CHRONIC KIDNEY DISEASE) STAGE 5, GFR LESS THAN 15 ML/MIN (H): Primary | ICD-10-CM

## 2022-04-13 LAB
ANION GAP SERPL CALCULATED.3IONS-SCNC: 12 MMOL/L (ref 5–18)
BUN SERPL-MCNC: 49 MG/DL (ref 8–22)
CALCIUM SERPL-MCNC: 7.3 MG/DL (ref 8.5–10.5)
CHLORIDE BLD-SCNC: 105 MMOL/L (ref 98–107)
CO2 SERPL-SCNC: 20 MMOL/L (ref 22–31)
CREAT SERPL-MCNC: 8 MG/DL (ref 0.6–1.1)
ERYTHROCYTE [DISTWIDTH] IN BLOOD BY AUTOMATED COUNT: 16.6 % (ref 10–15)
GFR SERPL CREATININE-BSD FRML MDRD: 6 ML/MIN/1.73M2
GLUCOSE BLD-MCNC: 84 MG/DL (ref 70–125)
GLUCOSE BLDC GLUCOMTR-MCNC: 73 MG/DL (ref 70–99)
HCT VFR BLD AUTO: 32 % (ref 35–47)
HGB BLD-MCNC: 9.5 G/DL (ref 11.7–15.7)
MCH RBC QN AUTO: 24.9 PG (ref 26.5–33)
MCHC RBC AUTO-ENTMCNC: 29.7 G/DL (ref 31.5–36.5)
MCV RBC AUTO: 84 FL (ref 78–100)
PLATELET # BLD AUTO: 275 10E3/UL (ref 150–450)
POTASSIUM BLD-SCNC: 4.6 MMOL/L (ref 3.5–5)
RBC # BLD AUTO: 3.81 10E6/UL (ref 3.8–5.2)
SODIUM SERPL-SCNC: 137 MMOL/L (ref 136–145)
WBC # BLD AUTO: 10 10E3/UL (ref 4–11)

## 2022-04-13 PROCEDURE — 82962 GLUCOSE BLOOD TEST: CPT

## 2022-04-13 PROCEDURE — C1760 CLOSURE DEV, VASC: HCPCS | Performed by: SURGERY

## 2022-04-13 PROCEDURE — 258N000003 HC RX IP 258 OP 636: Performed by: SURGERY

## 2022-04-13 PROCEDURE — 36821 AV FUSION DIRECT ANY SITE: CPT | Mod: 22 | Performed by: SURGERY

## 2022-04-13 PROCEDURE — 250N000009 HC RX 250: Performed by: NURSE ANESTHETIST, CERTIFIED REGISTERED

## 2022-04-13 PROCEDURE — 710N000012 HC RECOVERY PHASE 2, PER MINUTE: Performed by: SURGERY

## 2022-04-13 PROCEDURE — 999N000127 HC STATISTIC PERIPHERAL IV START W US GUIDANCE

## 2022-04-13 PROCEDURE — 999N000141 HC STATISTIC PRE-PROCEDURE NURSING ASSESSMENT: Performed by: SURGERY

## 2022-04-13 PROCEDURE — 36415 COLL VENOUS BLD VENIPUNCTURE: CPT | Performed by: SURGERY

## 2022-04-13 PROCEDURE — 250N000011 HC RX IP 250 OP 636: Performed by: ANESTHESIOLOGY

## 2022-04-13 PROCEDURE — 250N000011 HC RX IP 250 OP 636: Performed by: SURGERY

## 2022-04-13 PROCEDURE — 272N000001 HC OR GENERAL SUPPLY STERILE: Performed by: SURGERY

## 2022-04-13 PROCEDURE — 272N000004 HC RX 272: Performed by: SURGERY

## 2022-04-13 PROCEDURE — 85014 HEMATOCRIT: CPT | Performed by: SURGERY

## 2022-04-13 PROCEDURE — 250N000009 HC RX 250: Performed by: SURGERY

## 2022-04-13 PROCEDURE — 360N000077 HC SURGERY LEVEL 4, PER MIN: Performed by: SURGERY

## 2022-04-13 PROCEDURE — 250N000011 HC RX IP 250 OP 636: Performed by: NURSE ANESTHETIST, CERTIFIED REGISTERED

## 2022-04-13 PROCEDURE — 258N000003 HC RX IP 258 OP 636: Performed by: NURSE ANESTHETIST, CERTIFIED REGISTERED

## 2022-04-13 PROCEDURE — 80048 BASIC METABOLIC PNL TOTAL CA: CPT | Performed by: SURGERY

## 2022-04-13 PROCEDURE — 250N000013 HC RX MED GY IP 250 OP 250 PS 637: Performed by: ANESTHESIOLOGY

## 2022-04-13 PROCEDURE — 250N000011 HC RX IP 250 OP 636

## 2022-04-13 PROCEDURE — 370N000017 HC ANESTHESIA TECHNICAL FEE, PER MIN: Performed by: SURGERY

## 2022-04-13 PROCEDURE — 250N000009 HC RX 250: Performed by: ANESTHESIOLOGY

## 2022-04-13 PROCEDURE — C1757 CATH, THROMBECTOMY/EMBOLECT: HCPCS | Performed by: SURGERY

## 2022-04-13 RX ORDER — HALOPERIDOL 5 MG/ML
1 INJECTION INTRAMUSCULAR
Status: DISCONTINUED | OUTPATIENT
Start: 2022-04-13 | End: 2022-04-13 | Stop reason: HOSPADM

## 2022-04-13 RX ORDER — ONDANSETRON 2 MG/ML
4 INJECTION INTRAMUSCULAR; INTRAVENOUS EVERY 30 MIN PRN
Status: DISCONTINUED | OUTPATIENT
Start: 2022-04-13 | End: 2022-04-13 | Stop reason: HOSPADM

## 2022-04-13 RX ORDER — BUPIVACAINE HYDROCHLORIDE AND EPINEPHRINE 5; 5 MG/ML; UG/ML
INJECTION, SOLUTION PERINEURAL
Status: COMPLETED | OUTPATIENT
Start: 2022-04-13 | End: 2022-04-13

## 2022-04-13 RX ORDER — FENTANYL CITRATE 50 UG/ML
25 INJECTION, SOLUTION INTRAMUSCULAR; INTRAVENOUS EVERY 5 MIN PRN
Status: DISCONTINUED | OUTPATIENT
Start: 2022-04-13 | End: 2022-04-13 | Stop reason: HOSPADM

## 2022-04-13 RX ORDER — SODIUM CHLORIDE 9 MG/ML
INJECTION, SOLUTION INTRAVENOUS CONTINUOUS PRN
Status: DISCONTINUED | OUTPATIENT
Start: 2022-04-13 | End: 2022-04-13

## 2022-04-13 RX ORDER — MAGNESIUM HYDROXIDE 1200 MG/15ML
LIQUID ORAL PRN
Status: DISCONTINUED | OUTPATIENT
Start: 2022-04-13 | End: 2022-04-13 | Stop reason: HOSPADM

## 2022-04-13 RX ORDER — NALOXONE HYDROCHLORIDE 0.4 MG/ML
0.4 INJECTION, SOLUTION INTRAMUSCULAR; INTRAVENOUS; SUBCUTANEOUS
Status: DISCONTINUED | OUTPATIENT
Start: 2022-04-13 | End: 2022-04-13 | Stop reason: HOSPADM

## 2022-04-13 RX ORDER — SODIUM CHLORIDE, SODIUM LACTATE, POTASSIUM CHLORIDE, CALCIUM CHLORIDE 600; 310; 30; 20 MG/100ML; MG/100ML; MG/100ML; MG/100ML
INJECTION, SOLUTION INTRAVENOUS CONTINUOUS
Status: DISCONTINUED | OUTPATIENT
Start: 2022-04-13 | End: 2022-04-13 | Stop reason: HOSPADM

## 2022-04-13 RX ORDER — CARVEDILOL 12.5 MG/1
25 TABLET ORAL ONCE
Status: COMPLETED | OUTPATIENT
Start: 2022-04-13 | End: 2022-04-13

## 2022-04-13 RX ORDER — CARVEDILOL 12.5 MG/1
25 TABLET ORAL ONCE
Status: DISCONTINUED | OUTPATIENT
Start: 2022-04-13 | End: 2022-04-13

## 2022-04-13 RX ORDER — LIDOCAINE HYDROCHLORIDE 20 MG/ML
INJECTION, SOLUTION INFILTRATION; PERINEURAL PRN
Status: DISCONTINUED | OUTPATIENT
Start: 2022-04-13 | End: 2022-04-13

## 2022-04-13 RX ORDER — ONDANSETRON 2 MG/ML
INJECTION INTRAMUSCULAR; INTRAVENOUS PRN
Status: DISCONTINUED | OUTPATIENT
Start: 2022-04-13 | End: 2022-04-13

## 2022-04-13 RX ORDER — PROTAMINE SULFATE 10 MG/ML
INJECTION, SOLUTION INTRAVENOUS PRN
Status: DISCONTINUED | OUTPATIENT
Start: 2022-04-13 | End: 2022-04-13

## 2022-04-13 RX ORDER — HEPARIN SODIUM 1000 [USP'U]/ML
INJECTION, SOLUTION INTRAVENOUS; SUBCUTANEOUS PRN
Status: DISCONTINUED | OUTPATIENT
Start: 2022-04-13 | End: 2022-04-13

## 2022-04-13 RX ORDER — NALOXONE HYDROCHLORIDE 0.4 MG/ML
0.2 INJECTION, SOLUTION INTRAMUSCULAR; INTRAVENOUS; SUBCUTANEOUS
Status: DISCONTINUED | OUTPATIENT
Start: 2022-04-13 | End: 2022-04-13 | Stop reason: HOSPADM

## 2022-04-13 RX ORDER — OXYCODONE HYDROCHLORIDE 5 MG/1
5 TABLET ORAL EVERY 6 HOURS PRN
Qty: 10 TABLET | Refills: 0 | Status: SHIPPED | OUTPATIENT
Start: 2022-04-13 | End: 2022-04-23

## 2022-04-13 RX ORDER — CEFAZOLIN SODIUM/WATER 2 G/20 ML
2 SYRINGE (ML) INTRAVENOUS SEE ADMIN INSTRUCTIONS
Status: DISCONTINUED | OUTPATIENT
Start: 2022-04-13 | End: 2022-04-13 | Stop reason: HOSPADM

## 2022-04-13 RX ORDER — ONDANSETRON 4 MG/1
4 TABLET, ORALLY DISINTEGRATING ORAL EVERY 30 MIN PRN
Status: DISCONTINUED | OUTPATIENT
Start: 2022-04-13 | End: 2022-04-13 | Stop reason: HOSPADM

## 2022-04-13 RX ORDER — LIDOCAINE 40 MG/G
CREAM TOPICAL
Status: DISCONTINUED | OUTPATIENT
Start: 2022-04-13 | End: 2022-04-13 | Stop reason: HOSPADM

## 2022-04-13 RX ORDER — CEFAZOLIN SODIUM/WATER 2 G/20 ML
2 SYRINGE (ML) INTRAVENOUS
Status: COMPLETED | OUTPATIENT
Start: 2022-04-13 | End: 2022-04-13

## 2022-04-13 RX ORDER — FENTANYL CITRATE 50 UG/ML
25 INJECTION, SOLUTION INTRAMUSCULAR; INTRAVENOUS
Status: DISCONTINUED | OUTPATIENT
Start: 2022-04-13 | End: 2022-04-13 | Stop reason: HOSPADM

## 2022-04-13 RX ORDER — PROPOFOL 10 MG/ML
INJECTION, EMULSION INTRAVENOUS CONTINUOUS PRN
Status: DISCONTINUED | OUTPATIENT
Start: 2022-04-13 | End: 2022-04-13

## 2022-04-13 RX ORDER — FENTANYL CITRATE 50 UG/ML
50 INJECTION, SOLUTION INTRAMUSCULAR; INTRAVENOUS
Status: DISCONTINUED | OUTPATIENT
Start: 2022-04-13 | End: 2022-04-13 | Stop reason: HOSPADM

## 2022-04-13 RX ADMIN — Medication 2 G: at 12:28

## 2022-04-13 RX ADMIN — PROTAMINE SULFATE 30 MG: 10 INJECTION, SOLUTION INTRAVENOUS at 14:17

## 2022-04-13 RX ADMIN — FENTANYL CITRATE 50 MCG: 50 INJECTION INTRAMUSCULAR; INTRAVENOUS at 11:42

## 2022-04-13 RX ADMIN — MIDAZOLAM HYDROCHLORIDE 1 MG: 1 INJECTION, SOLUTION INTRAMUSCULAR; INTRAVENOUS at 11:42

## 2022-04-13 RX ADMIN — PROPOFOL 100 MCG/KG/MIN: 10 INJECTION, EMULSION INTRAVENOUS at 12:31

## 2022-04-13 RX ADMIN — LIDOCAINE HYDROCHLORIDE 40 MG: 20 INJECTION, SOLUTION INFILTRATION; PERINEURAL at 12:31

## 2022-04-13 RX ADMIN — CARVEDILOL 25 MG: 12.5 TABLET, FILM COATED ORAL at 11:37

## 2022-04-13 RX ADMIN — ONDANSETRON 4 MG: 2 INJECTION INTRAMUSCULAR; INTRAVENOUS at 14:18

## 2022-04-13 RX ADMIN — SODIUM CHLORIDE: 9 INJECTION, SOLUTION INTRAVENOUS at 12:24

## 2022-04-13 RX ADMIN — BUPIVACAINE HYDROCHLORIDE AND EPINEPHRINE BITARTRATE 25 ML: 5; .005 INJECTION, SOLUTION PERINEURAL at 11:44

## 2022-04-13 RX ADMIN — HEPARIN SODIUM 6000 UNITS: 1000 INJECTION INTRAVENOUS; SUBCUTANEOUS at 13:21

## 2022-04-13 ASSESSMENT — LIFESTYLE VARIABLES: TOBACCO_USE: 1

## 2022-04-13 NOTE — OR NURSING
Lab called with critical lab at 1146.  Creatinine 8.0.  Result given to pre-op RN and Dr. Faustin (anesthesiologist) at 1147.   No further orders.

## 2022-04-13 NOTE — OP NOTE
VASCULAR SURGERY OPERATIVE REPORT        LOCATION:    [facility]    Alysia Hernandez   Medical Record #:  [id number]  YOB: 1985  Age:  36 year old     Date of Service: 3/28/2022    PRIMARY CARE PROVIDER: No Ref-Primary, Physician    Preoperative diagnosis    Chronic kidney disease requiring potential dialysis in the near future    Postoperative diagnosis    Same       Surgeon: Sobeida Joya MD, RPVI     Assistant: Jessica Arzate PA-C                         Name of the procedure    Creation of  left brachiocephalic arteriovenous fistula   Thrombectomy of the left cephalic vein    Anesthesia:    Moderate sedation with block    Indication for procedure:    36-year-old female with past medical history significant for chronic disease who was evaluated for fistula creation.  Ultrasound did show marginal size of cephalic and basilic vein but doable for AV fistula.  Risk and benefits of this operation were explained, and patient agreed to proceed.    Description of procedure:    The patient was positioned supine with the arm outstretched to near 90 . The procedure was performed under axillary block. The left upper extremity was prepped and draped in the usual sterile fashion.  The skin over the antecubital fossa was infiltrated with 0.5% lidocaine.  A 3-cm transverse skin incision was then performed over the antecubital fossa. The cephalic vein was identified and encircled with a vessel loop. The vein was dissected for a segment of 5 cm. The dissection was carried as distally as possible through that incision. Attention was then directed to the brachial artery. The tendonous aponeurosis of the biceps muscle was incised. The location of the brachial artery was identified by palpation. The soft tissue over the brachial artery was then incised and the brachial artery was identified and encircled with a vessel loop.The patient was given 5,000 U of heparin intravenously.  The cephalic vein was  then ligated at its most distal end. Yasargil clips were applied on the brachial artery and a 7-mm incision in the anterior wall of the brachial artery was performed. The cephalic vein was then gently curved and allowed to lie over the arteriotomy. The end of the vein was spatulated to match the size of the arteriotomy.The anastomosis was then performed using a 6-0 prolene running suture. At the completion of the suture line, the brachial artery was forward-flushed and then allowed to backbleed. The cephalic vein was also allowed to backbleed. The anastomosis was irrigated with heparinized solution. The sutures were then tied and the suture line evaluated for hemostasis, which was adequate.there is evidence of relatively weak thrill through the cephalic vein.  We decided to clamp the brachial artery.  Transverse venotomy was performed.  It was extended medially and laterally using Otero scissors.  Then using Marco A dilators were able to reevaluate left cephalic vein which appeared to be patent.  The anastomosis also appeared to be patent.  Then we proceeded with advancement of size 3 Wendy catheter, and additional thrombectomy was performed.  Were able to retrieve small organized thrombus.  The venotomy was closed using interrupted 6-0 plain suture.  There was evidence of an excellent thrill in the cephalic vein. There was evidence of strong palpable pulses in the brachial, radial, and ulnar arteries at the wrist. There was no evidence of any kinks.The wound was then irrigated with antibiotic solution.The subcutaneous tissue was closed with 3-0 Vicryl and the skin closed with 4-0 subcuticular monocryl sutures. There was evidence of an excellent thrill in the cephalic vein after wound closure.  The patient tolerated the procedure well and was taken to the postanesthesia care unit in stable condition.    Estimated blood loss: 10 cc    Specimens: none     Complications: None    Sobeida Joya MD, RPVI  VASCULAR  SURGERY

## 2022-04-13 NOTE — TELEPHONE ENCOUNTER
Central Prior Authorization Team   Phone: [phone]      Medication Appeal Initiation    We have initiated an appeal for the requested medication:  Medication: Continuous Blood Gluc Sensor (FREESTYLE HARINI 2 SENSOR) [facility] - EPA Denied   Appeal Start Date:  4/13/2022  Insurance Company: Other (see comments)Comment:  [employer] FAX: [phone] PHONE: [phone]  Comments:  [employer]    REFERENCE [id number]    Faxed lmn and original denial letter to appeals department.

## 2022-04-13 NOTE — OR NURSING
Dr. Gutierrez asked to see pt regarding O2 sats. Pt has been 88-92%. Denies shortness of breath difficulty breathing. Improved with IS. Pt instructed to use IS every 30 min while awake.

## 2022-04-13 NOTE — OR NURSING
Left AV fistula with good thrill and broit.good distal radila pulse, arm warm, color good. \block still in effect.

## 2022-04-13 NOTE — ANESTHESIA CARE TRANSFER NOTE
Patient: Alysia Hernandez    Procedure: Procedure(s):  CREATION, ARTERIOVENOUS FISTULA,LEFT  UPPER EXTREMITY;THROMBELECTOMY LEFT CEPHALIC VEIN.       Diagnosis: Chronic kidney disease, unspecified CKD stage [N18.9]  Diagnosis Additional Information: No value filed.    Anesthesia Type:   General     Note:    Oropharynx: oropharynx clear of all foreign objects  Level of Consciousness: drowsy  Oxygen Supplementation: face mask  Level of Supplemental Oxygen (L/min / FiO2): 8  Independent Airway: airway patency satisfactory and stable  Dentition: dentition unchanged  Vital Signs Stable: post-procedure vital signs reviewed and stable  Report to RN Given: handoff report given  Patient transferred to: Phase II    Handoff Report: Identifed the Patient, Identified the Reponsible Provider, Reviewed the pertinent medical history, Discussed the surgical course, Reviewed Intra-OP anesthesia mangement and issues during anesthesia, Set expectations for post-procedure period and Allowed opportunity for questions and acknowledgement of understanding      Vitals:  Vitals Value Taken Time   /65 04/13/22 1435   Temp 97.7 f      Pulse 80 04/13/22 1440   Resp 28    SpO2 100 % 04/13/22 1440   Vitals shown include unvalidated device data.    Electronically Signed By: MARGA Hargrove CRNA  April 13, 2022  2:42 PM

## 2022-04-13 NOTE — ANESTHESIA POSTPROCEDURE EVALUATION
Patient: Alysia Hernandez    Procedure: Procedure(s):  CREATION, ARTERIOVENOUS FISTULA,LEFT  UPPER EXTREMITY;THROMBELECTOMY LEFT CEPHALIC VEIN.       Anesthesia Type:  General    Note:  Disposition: Inpatient   Postop Pain Control: Uneventful            Sign Out: Well controlled pain   PONV: No   Neuro/Psych: Uneventful            Sign Out: Acceptable/Baseline neuro status   Airway/Respiratory: Uneventful            Sign Out: Acceptable/Baseline resp. status   CV/Hemodynamics: Uneventful            Sign Out: Acceptable CV status; No obvious hypovolemia; No obvious fluid overload   Other NRE: NONE   DID A NON-ROUTINE EVENT OCCUR? No    Event details/Postop Comments:  No issues.  Teeth atraumatic.  Pain controlled.  AVSS.  No awareness.  Eyes without trauma. Mother and babies doing well.           Last vitals:  Vitals Value Taken Time   /75 04/13/22 1530   Temp 36.6  C (97.8  F) 04/13/22 1545   Pulse 77 04/13/22 1545   Resp 20 04/13/22 1545   SpO2 94 % 04/13/22 1545       Electronically Signed By: Shawnee Garcia MD  April 13, 2022  3:59 PM

## 2022-04-13 NOTE — ANESTHESIA PREPROCEDURE EVALUATION
Anesthesia Pre-Procedure Evaluation    Patient: Alysia Hernandez   MRN: [id number] : 1985        Procedure : Procedure(s):  CREATION, ARTERIOVENOUS FISTULA, UPPER EXTREMITY          Past Medical History:   Diagnosis Date     Acute respiratory failure with hypoxia (H) 04/10/2022     Chronic kidney disease, stage V (H)      Diabetes (H) 2020     Diabetes mellitus (H)      Fluid overload 04/10/2022     HTN (hypertension) 2015     Hypertensive retinopathy of both eyes 2013     Nephrotic range proteinuria 2020     Obesity (BMI 30.0-34.9) 2020     Renal failure, unspecified chronicity 2020     Smoking 2016      History reviewed. No pertinent surgical history.   No Known Allergies   Social History     Tobacco Use     Smoking status: Former Smoker     Types: Cigarettes     Quit date: 2022     Smokeless tobacco: Never Used     Tobacco comment: currently on E -cig. -- 13   Substance Use Topics     Alcohol use: No      Wt Readings from Last 1 Encounters:   22 89.6 kg (197 lb 8 oz)        Anesthesia Evaluation   Pt has not had prior anesthetic         ROS/MED HX  ENT/Pulmonary: Comment: Recently admitted to [facility] with acute onset hypoxia 2/2 fluid overload - improved with diuresis and brief supplemental O2. Alysia did not take her PO Torsemide today. She notes mild SOB, which is at her baseline.     (+) tobacco use,     Neurologic:  - neg neurologic ROS     Cardiovascular:     (+) hypertension----- (-) CAD   METS/Exercise Tolerance: 1 - Eating, dressing    Hematologic:  - neg hematologic  ROS     Musculoskeletal:  - neg musculoskeletal ROS     GI/Hepatic:    (-) GERD   Renal/Genitourinary:     (+) renal disease (s/f fistula placement today for planned initiation of HD), type: ESRD, Pt requires dialysis,     Endo:     (+) type II DM,     Psychiatric/Substance Use:  - neg psychiatric ROS     Infectious Disease:       Malignancy:       Other:             Physical Exam    Airway        Mallampati: IV   TM distance: < 3 FB   Neck ROM: full   Mouth opening: < 3 cm    Respiratory Devices and Support         Dental  no notable dental history         Cardiovascular   cardiovascular exam normal          Pulmonary   pulmonary exam normal                OUTSIDE LABS:  CBC:   Lab Results   Component Value Date    WBC 10.5 04/04/2022    WBC 11.9 (H) 09/05/2021    HGB 8.7 (L) 04/04/2022    HGB 9.3 (L) 09/05/2021    HCT 28.9 (L) 04/04/2022    HCT 29.5 (L) 09/05/2021     04/04/2022     09/05/2021     BMP:   Lab Results   Component Value Date     04/04/2022     09/05/2021    POTASSIUM 4.9 04/04/2022    POTASSIUM 5.0 09/05/2021    CHLORIDE 106 04/04/2022    CHLORIDE 108 (H) 09/05/2021    CO2 18 (L) 04/04/2022    CO2 21 (L) 09/05/2021    BUN 50 (H) 04/04/2022    BUN 46 (H) 09/05/2021    CR 7.72 (HH) 04/04/2022    CR 4.85 (H) 09/05/2021    GLC 75 04/04/2022     (H) 09/06/2021     COAGS: No results found for: PTT, INR, FIBR  POC:   Lab Results   Component Value Date    HCGS Negative 09/03/2021     HEPATIC:   Lab Results   Component Value Date    ALBUMIN 3.2 (L) 04/04/2022    PROTTOTAL 7.3 04/04/2022    ALT <9 04/04/2022    AST 8 04/04/2022    ALKPHOS 52 04/04/2022    BILITOTAL 0.4 04/04/2022     OTHER:   Lab Results   Component Value Date    LACT 0.8 09/04/2021    A1C 5.0 04/04/2022    PAUL 6.9 (L) 04/04/2022    PHOS 4.9 (H) 09/04/2021    MAG 1.4 (L) 09/03/2021    TSH 2.43 09/04/2021       Anesthesia Plan    ASA Status:  4   NPO Status:  NPO Appropriate    Anesthesia Type: General.     - Airway: Mask Only   Induction: Propofol, Intravenous.   Maintenance: TIVA.        Consents    Anesthesia Plan(s) and associated risks, benefits, and realistic alternatives discussed. Questions answered and patient/representative(s) expressed understanding.    - Discussed:     - Discussed with:  Patient         Postoperative Care    Pain management: Peripheral  nerve block (Single Shot).   PONV prophylaxis: Ondansetron (or other 5HT-3), Background Propofol Infusion     Comments:    Other Comments: Preop supraclavicular PNB  General mask - TIVA w/ propofol  Zofran 4mg  Low threshold for supplemental diuresis. Judicious fluid administration.   Glidescope if ETT required             Emelia Faustin MD

## 2022-04-13 NOTE — ANESTHESIA PROCEDURE NOTES
Supraclavicular Procedure Note    Pre-Procedure   Staff -        Anesthesiologist:  Emelia Jarrett MD       Performed By: anesthesiologist       Location: pre-op       Procedure Start/Stop Times: 4/13/2022 11:44 AM and 4/13/2022 11:48 AM       Pre-Anesthestic Checklist: patient identified, IV checked, site marked, risks and benefits discussed, informed consent, monitors and equipment checked, pre-op evaluation, at physician/surgeon's request and post-op pain management  Timeout:       Correct Patient: Yes        Correct Procedure: Yes        Correct Site: Yes        Correct Position: Yes        Correct Laterality: Yes        Site Marked: Yes  Procedure Documentation  Procedure: Supraclavicular       Laterality: left       Patient Position: sitting       Patient Prep/Sterile Barriers: sterile gloves, mask       Skin prep: Chloraprep       Needle type: Stimuplex.       Needle Gauge: 21.        Needle Length (Inches): 4        Ultrasound guided       1. Ultrasound was used to identify targeted nerve, plexus, vascular marker, or fascial plane and place a needle adjacent to it in real-time.       2. Ultrasound was used to visualize the spread of anesthetic in close proximity to the above referenced structure.       3. A permanent image is entered into the patient's record.       4. The visualized anatomic structures appeared normal.       5. There were no apparent abnormal pathologic findings.    Assessment/Narrative         The placement was negative for: blood aspirated, painful injection and site bleeding       Paresthesias: No.       Bolus given via needle..        Secured via.        Insertion/Infusion Method: Single Shot       Complications: none       Injection made incrementally with aspirations every 5 mL.    Medication(s) Administered   Bupivacaine 0.5% w/ 1:200K Epi (Other) - Other   25 mL - 4/13/2022 11:44:00 AM  Medication Administration Time: 4/13/2022 11:44 AM

## 2022-04-19 ENCOUNTER — TRANSFERRED RECORDS (OUTPATIENT)
Dept: HEALTH INFORMATION MANAGEMENT | Facility: CLINIC | Age: 37
End: 2022-04-19
Payer: COMMERCIAL

## 2022-04-23 ENCOUNTER — HOSPITAL ENCOUNTER (INPATIENT)
Facility: HOSPITAL | Age: 37
LOS: 7 days | Discharge: HOME | End: 2022-04-30
Attending: EMERGENCY MEDICINE | Admitting: STUDENT IN AN ORGANIZED HEALTH CARE EDUCATION/TRAINING PROGRAM
Payer: COMMERCIAL

## 2022-04-23 ENCOUNTER — APPOINTMENT (OUTPATIENT)
Dept: ULTRASOUND IMAGING | Facility: HOSPITAL | Age: 37
End: 2022-04-23
Attending: EMERGENCY MEDICINE
Payer: COMMERCIAL

## 2022-04-23 ENCOUNTER — APPOINTMENT (OUTPATIENT)
Dept: RADIOLOGY | Facility: HOSPITAL | Age: 37
End: 2022-04-23
Attending: EMERGENCY MEDICINE
Payer: COMMERCIAL

## 2022-04-23 ENCOUNTER — APPOINTMENT (OUTPATIENT)
Dept: NUCLEAR MEDICINE | Facility: HOSPITAL | Age: 37
End: 2022-04-23
Attending: EMERGENCY MEDICINE
Payer: COMMERCIAL

## 2022-04-23 DIAGNOSIS — I10 PRIMARY HYPERTENSION: ICD-10-CM

## 2022-04-23 DIAGNOSIS — J96.01 ACUTE RESPIRATORY FAILURE WITH HYPOXIA (H): ICD-10-CM

## 2022-04-23 DIAGNOSIS — R50.9 FEVER, UNSPECIFIED FEVER CAUSE: ICD-10-CM

## 2022-04-23 DIAGNOSIS — T22.292A SECOND DEGREE BURN OF MULTIPLE SITES OF LEFT SHOULDER AND UPPER EXTREMITY EXCEPT WRIST AND HAND, INITIAL ENCOUNTER: ICD-10-CM

## 2022-04-23 DIAGNOSIS — N18.5 CKD (CHRONIC KIDNEY DISEASE) STAGE 5, GFR LESS THAN 15 ML/MIN (H): Primary | ICD-10-CM

## 2022-04-23 DIAGNOSIS — E83.42 HYPOMAGNESEMIA: ICD-10-CM

## 2022-04-23 LAB
ALBUMIN SERPL-MCNC: 3.1 G/DL (ref 3.5–5)
ALP SERPL-CCNC: 55 U/L (ref 45–120)
ALT SERPL W P-5'-P-CCNC: 9 U/L (ref 0–45)
ANION GAP SERPL CALCULATED.3IONS-SCNC: 16 MMOL/L (ref 5–18)
AST SERPL W P-5'-P-CCNC: 17 U/L (ref 0–40)
BASE EXCESS BLDV CALC-SCNC: -4 MMOL/L
BASOPHILS # BLD AUTO: 0.1 10E3/UL (ref 0–0.2)
BASOPHILS NFR BLD AUTO: 1 %
BILIRUB SERPL-MCNC: 0.4 MG/DL (ref 0–1)
BNP SERPL-MCNC: 419 PG/ML (ref 0–64)
BUN SERPL-MCNC: 71 MG/DL (ref 8–22)
CALCIUM SERPL-MCNC: 7.3 MG/DL (ref 8.5–10.5)
CHLORIDE BLD-SCNC: 98 MMOL/L (ref 98–107)
CO2 SERPL-SCNC: 18 MMOL/L (ref 22–31)
CREAT SERPL-MCNC: 8.87 MG/DL (ref 0.6–1.1)
D DIMER PPP FEU-MCNC: 1.92 UG/ML FEU (ref 0–0.5)
EOSINOPHIL # BLD AUTO: 0.1 10E3/UL (ref 0–0.7)
EOSINOPHIL NFR BLD AUTO: 1 %
ERYTHROCYTE [DISTWIDTH] IN BLOOD BY AUTOMATED COUNT: 15.9 % (ref 10–15)
FLUAV RNA SPEC QL NAA+PROBE: NEGATIVE
FLUBV RNA RESP QL NAA+PROBE: NEGATIVE
GFR SERPL CREATININE-BSD FRML MDRD: 5 ML/MIN/1.73M2
GLUCOSE BLD-MCNC: 113 MG/DL (ref 70–125)
HCG SERPL QL: NEGATIVE
HCO3 BLDV-SCNC: 21 MMOL/L (ref 24–30)
HCT VFR BLD AUTO: 32.4 % (ref 35–47)
HGB BLD-MCNC: 9.9 G/DL (ref 11.7–15.7)
IMM GRANULOCYTES # BLD: 0.2 10E3/UL
IMM GRANULOCYTES NFR BLD: 2 %
LACTATE SERPL-SCNC: 0.5 MMOL/L (ref 0.7–2)
LYMPHOCYTES # BLD AUTO: 0.7 10E3/UL (ref 0.8–5.3)
LYMPHOCYTES NFR BLD AUTO: 8 %
MAGNESIUM SERPL-MCNC: 1.8 MG/DL (ref 1.8–2.6)
MCH RBC QN AUTO: 24.7 PG (ref 26.5–33)
MCHC RBC AUTO-ENTMCNC: 30.6 G/DL (ref 31.5–36.5)
MCV RBC AUTO: 81 FL (ref 78–100)
MONOCYTES # BLD AUTO: 1.1 10E3/UL (ref 0–1.3)
MONOCYTES NFR BLD AUTO: 14 %
NEUTROPHILS # BLD AUTO: 6 10E3/UL (ref 1.6–8.3)
NEUTROPHILS NFR BLD AUTO: 74 %
NRBC # BLD AUTO: 0 10E3/UL
NRBC BLD AUTO-RTO: 0 /100
OXYHGB MFR BLDV: 77.4 % (ref 70–75)
PCO2 BLDV: 38 MM HG (ref 35–50)
PH BLDV: 7.36 [PH] (ref 7.35–7.45)
PLATELET # BLD AUTO: 303 10E3/UL (ref 150–450)
PO2 BLDV: 46 MM HG (ref 25–47)
POTASSIUM BLD-SCNC: 4.2 MMOL/L (ref 3.5–5)
POTASSIUM BLD-SCNC: 4.4 MMOL/L (ref 3.5–5)
PROCALCITONIN SERPL-MCNC: 0.43 NG/ML (ref 0–0.49)
PROT SERPL-MCNC: 8.7 G/DL (ref 6–8)
RBC # BLD AUTO: 4.01 10E6/UL (ref 3.8–5.2)
SAO2 % BLDV: 78.7 % (ref 70–75)
SARS-COV-2 RNA RESP QL NAA+PROBE: NEGATIVE
SODIUM SERPL-SCNC: 132 MMOL/L (ref 136–145)
WBC # BLD AUTO: 7.9 10E3/UL (ref 4–11)

## 2022-04-23 PROCEDURE — 99285 EMERGENCY DEPT VISIT HI MDM: CPT | Mod: 25

## 2022-04-23 PROCEDURE — 84132 ASSAY OF SERUM POTASSIUM: CPT | Performed by: STUDENT IN AN ORGANIZED HEALTH CARE EDUCATION/TRAINING PROGRAM

## 2022-04-23 PROCEDURE — 272N000054 HC CANNULA HIGH FLOW, ADULT

## 2022-04-23 PROCEDURE — 99221 1ST HOSP IP/OBS SF/LOW 40: CPT | Mod: AI | Performed by: STUDENT IN AN ORGANIZED HEALTH CARE EDUCATION/TRAINING PROGRAM

## 2022-04-23 PROCEDURE — C9803 HOPD COVID-19 SPEC COLLECT: HCPCS

## 2022-04-23 PROCEDURE — 83605 ASSAY OF LACTIC ACID: CPT | Performed by: EMERGENCY MEDICINE

## 2022-04-23 PROCEDURE — 343N000001 HC RX 343: Performed by: STUDENT IN AN ORGANIZED HEALTH CARE EDUCATION/TRAINING PROGRAM

## 2022-04-23 PROCEDURE — 258N000003 HC RX IP 258 OP 636: Performed by: EMERGENCY MEDICINE

## 2022-04-23 PROCEDURE — 250N000011 HC RX IP 250 OP 636: Performed by: INTERNAL MEDICINE

## 2022-04-23 PROCEDURE — 82306 VITAMIN D 25 HYDROXY: CPT | Performed by: INTERNAL MEDICINE

## 2022-04-23 PROCEDURE — 250N000011 HC RX IP 250 OP 636: Performed by: EMERGENCY MEDICINE

## 2022-04-23 PROCEDURE — 93005 ELECTROCARDIOGRAM TRACING: CPT | Performed by: EMERGENCY MEDICINE

## 2022-04-23 PROCEDURE — 85379 FIBRIN DEGRADATION QUANT: CPT | Performed by: EMERGENCY MEDICINE

## 2022-04-23 PROCEDURE — 83735 ASSAY OF MAGNESIUM: CPT | Performed by: STUDENT IN AN ORGANIZED HEALTH CARE EDUCATION/TRAINING PROGRAM

## 2022-04-23 PROCEDURE — 80053 COMPREHEN METABOLIC PANEL: CPT | Performed by: EMERGENCY MEDICINE

## 2022-04-23 PROCEDURE — 85025 COMPLETE CBC W/AUTO DIFF WBC: CPT | Performed by: EMERGENCY MEDICINE

## 2022-04-23 PROCEDURE — 999N000215 HC STATISTIC HFNC ADULT NON-CPAP

## 2022-04-23 PROCEDURE — 5A09357 ASSISTANCE WITH RESPIRATORY VENTILATION, LESS THAN 24 CONSECUTIVE HOURS, CONTINUOUS POSITIVE AIRWAY PRESSURE: ICD-10-PCS | Performed by: EMERGENCY MEDICINE

## 2022-04-23 PROCEDURE — 86704 HEP B CORE ANTIBODY TOTAL: CPT | Performed by: INTERNAL MEDICINE

## 2022-04-23 PROCEDURE — 210N000001 HC R&B IMCU HEART CARE

## 2022-04-23 PROCEDURE — 87636 SARSCOV2 & INF A&B AMP PRB: CPT | Performed by: EMERGENCY MEDICINE

## 2022-04-23 PROCEDURE — 84703 CHORIONIC GONADOTROPIN ASSAY: CPT | Performed by: EMERGENCY MEDICINE

## 2022-04-23 PROCEDURE — 86706 HEP B SURFACE ANTIBODY: CPT | Performed by: INTERNAL MEDICINE

## 2022-04-23 PROCEDURE — 96375 TX/PRO/DX INJ NEW DRUG ADDON: CPT

## 2022-04-23 PROCEDURE — 83880 ASSAY OF NATRIURETIC PEPTIDE: CPT | Performed by: EMERGENCY MEDICINE

## 2022-04-23 PROCEDURE — 36415 COLL VENOUS BLD VENIPUNCTURE: CPT | Performed by: EMERGENCY MEDICINE

## 2022-04-23 PROCEDURE — 93970 EXTREMITY STUDY: CPT

## 2022-04-23 PROCEDURE — 87340 HEPATITIS B SURFACE AG IA: CPT | Performed by: INTERNAL MEDICINE

## 2022-04-23 PROCEDURE — 999N000157 HC STATISTIC RCP TIME EA 10 MIN

## 2022-04-23 PROCEDURE — 71045 X-RAY EXAM CHEST 1 VIEW: CPT

## 2022-04-23 PROCEDURE — 96365 THER/PROPH/DIAG IV INF INIT: CPT | Mod: 59

## 2022-04-23 PROCEDURE — A9540 TC99M MAA: HCPCS | Performed by: STUDENT IN AN ORGANIZED HEALTH CARE EDUCATION/TRAINING PROGRAM

## 2022-04-23 PROCEDURE — 78580 LUNG PERFUSION IMAGING: CPT

## 2022-04-23 PROCEDURE — 250N000013 HC RX MED GY IP 250 OP 250 PS 637: Performed by: EMERGENCY MEDICINE

## 2022-04-23 PROCEDURE — 84145 PROCALCITONIN (PCT): CPT | Performed by: EMERGENCY MEDICINE

## 2022-04-23 PROCEDURE — 36415 COLL VENOUS BLD VENIPUNCTURE: CPT | Performed by: STUDENT IN AN ORGANIZED HEALTH CARE EDUCATION/TRAINING PROGRAM

## 2022-04-23 PROCEDURE — 94660 CPAP INITIATION&MGMT: CPT

## 2022-04-23 PROCEDURE — 87040 BLOOD CULTURE FOR BACTERIA: CPT | Performed by: EMERGENCY MEDICINE

## 2022-04-23 PROCEDURE — 82805 BLOOD GASES W/O2 SATURATION: CPT | Performed by: EMERGENCY MEDICINE

## 2022-04-23 RX ORDER — AMLODIPINE BESYLATE 5 MG/1
10 TABLET ORAL DAILY
Status: DISCONTINUED | OUTPATIENT
Start: 2022-04-24 | End: 2022-04-30 | Stop reason: HOSPADM

## 2022-04-23 RX ORDER — CEFEPIME HYDROCHLORIDE 1 G/1
1 INJECTION, POWDER, FOR SOLUTION INTRAMUSCULAR; INTRAVENOUS ONCE
Status: DISCONTINUED | OUTPATIENT
Start: 2022-04-23 | End: 2022-04-23 | Stop reason: CLARIF

## 2022-04-23 RX ORDER — HYDRALAZINE HYDROCHLORIDE 50 MG/1
50 TABLET, FILM COATED ORAL ONCE
Status: COMPLETED | OUTPATIENT
Start: 2022-04-24 | End: 2022-04-24

## 2022-04-23 RX ORDER — ACETAMINOPHEN 650 MG/1
650 SUPPOSITORY RECTAL EVERY 6 HOURS PRN
Status: DISCONTINUED | OUTPATIENT
Start: 2022-04-23 | End: 2022-04-30 | Stop reason: HOSPADM

## 2022-04-23 RX ORDER — LORAZEPAM 2 MG/ML
1 INJECTION INTRAMUSCULAR ONCE
Status: COMPLETED | OUTPATIENT
Start: 2022-04-23 | End: 2022-04-23

## 2022-04-23 RX ORDER — HEPARIN SODIUM 10000 [USP'U]/100ML
0-5000 INJECTION, SOLUTION INTRAVENOUS CONTINUOUS
Status: DISCONTINUED | OUTPATIENT
Start: 2022-04-23 | End: 2022-04-24

## 2022-04-23 RX ORDER — CALCITRIOL 0.25 UG/1
0.25 CAPSULE, LIQUID FILLED ORAL
Status: DISCONTINUED | OUTPATIENT
Start: 2022-04-25 | End: 2022-04-30 | Stop reason: HOSPADM

## 2022-04-23 RX ORDER — TORSEMIDE 20 MG/1
40 TABLET ORAL DAILY
Status: ON HOLD | COMMUNITY
End: 2022-04-29

## 2022-04-23 RX ORDER — CEFEPIME HYDROCHLORIDE 1 G/1
1 INJECTION, POWDER, FOR SOLUTION INTRAMUSCULAR; INTRAVENOUS EVERY 24 HOURS
Status: DISCONTINUED | OUTPATIENT
Start: 2022-04-24 | End: 2022-04-26

## 2022-04-23 RX ORDER — CARVEDILOL 12.5 MG/1
25 TABLET ORAL 2 TIMES DAILY WITH MEALS
Status: DISCONTINUED | OUTPATIENT
Start: 2022-04-24 | End: 2022-04-30 | Stop reason: HOSPADM

## 2022-04-23 RX ORDER — HYDRALAZINE HYDROCHLORIDE 50 MG/1
50 TABLET, FILM COATED ORAL 3 TIMES DAILY
Status: DISCONTINUED | OUTPATIENT
Start: 2022-04-24 | End: 2022-04-30 | Stop reason: HOSPADM

## 2022-04-23 RX ORDER — FUROSEMIDE 10 MG/ML
40 INJECTION INTRAMUSCULAR; INTRAVENOUS ONCE
Status: COMPLETED | OUTPATIENT
Start: 2022-04-23 | End: 2022-04-23

## 2022-04-23 RX ORDER — AMOXICILLIN 250 MG
1 CAPSULE ORAL 2 TIMES DAILY PRN
Status: DISCONTINUED | OUTPATIENT
Start: 2022-04-23 | End: 2022-04-30 | Stop reason: HOSPADM

## 2022-04-23 RX ORDER — ACETAMINOPHEN 325 MG/1
650 TABLET ORAL ONCE
Status: COMPLETED | OUTPATIENT
Start: 2022-04-23 | End: 2022-04-23

## 2022-04-23 RX ORDER — GLIPIZIDE 5 MG/1
5 TABLET, FILM COATED, EXTENDED RELEASE ORAL DAILY
Status: ON HOLD | COMMUNITY
End: 2022-04-29

## 2022-04-23 RX ORDER — CEFAZOLIN SODIUM 1 G/50ML
2000 SOLUTION INTRAVENOUS ONCE
Status: DISCONTINUED | OUTPATIENT
Start: 2022-04-23 | End: 2022-04-23 | Stop reason: DRUGHIGH

## 2022-04-23 RX ORDER — AMOXICILLIN 250 MG
2 CAPSULE ORAL 2 TIMES DAILY PRN
Status: DISCONTINUED | OUTPATIENT
Start: 2022-04-23 | End: 2022-04-30 | Stop reason: HOSPADM

## 2022-04-23 RX ORDER — DEXTROSE MONOHYDRATE 25 G/50ML
25-50 INJECTION, SOLUTION INTRAVENOUS
Status: DISCONTINUED | OUTPATIENT
Start: 2022-04-23 | End: 2022-04-30 | Stop reason: HOSPADM

## 2022-04-23 RX ORDER — ONDANSETRON 2 MG/ML
4 INJECTION INTRAMUSCULAR; INTRAVENOUS EVERY 6 HOURS PRN
Status: DISCONTINUED | OUTPATIENT
Start: 2022-04-23 | End: 2022-04-30 | Stop reason: HOSPADM

## 2022-04-23 RX ORDER — NICOTINE POLACRILEX 4 MG
15-30 LOZENGE BUCCAL
Status: DISCONTINUED | OUTPATIENT
Start: 2022-04-23 | End: 2022-04-30 | Stop reason: HOSPADM

## 2022-04-23 RX ORDER — CEFAZOLIN SODIUM 1 G/50ML
2000 SOLUTION INTRAVENOUS ONCE
Status: COMPLETED | OUTPATIENT
Start: 2022-04-23 | End: 2022-04-23

## 2022-04-23 RX ORDER — BUMETANIDE 0.25 MG/ML
4 INJECTION INTRAMUSCULAR; INTRAVENOUS ONCE
Status: COMPLETED | OUTPATIENT
Start: 2022-04-23 | End: 2022-04-23

## 2022-04-23 RX ORDER — DOXAZOSIN 1 MG/1
2 TABLET ORAL DAILY
Status: DISCONTINUED | OUTPATIENT
Start: 2022-04-24 | End: 2022-04-24

## 2022-04-23 RX ORDER — ONDANSETRON 4 MG/1
4 TABLET, ORALLY DISINTEGRATING ORAL EVERY 6 HOURS PRN
Status: DISCONTINUED | OUTPATIENT
Start: 2022-04-23 | End: 2022-04-30 | Stop reason: HOSPADM

## 2022-04-23 RX ORDER — CEFEPIME HYDROCHLORIDE 1 G/1
1 INJECTION, POWDER, FOR SOLUTION INTRAMUSCULAR; INTRAVENOUS ONCE
Status: COMPLETED | OUTPATIENT
Start: 2022-04-23 | End: 2022-04-23

## 2022-04-23 RX ORDER — POLYETHYLENE GLYCOL 3350 17 G/17G
17 POWDER, FOR SOLUTION ORAL DAILY PRN
Status: DISCONTINUED | OUTPATIENT
Start: 2022-04-23 | End: 2022-04-30 | Stop reason: HOSPADM

## 2022-04-23 RX ORDER — ACETAMINOPHEN 325 MG/1
650 TABLET ORAL EVERY 6 HOURS PRN
Status: DISCONTINUED | OUTPATIENT
Start: 2022-04-23 | End: 2022-04-30 | Stop reason: HOSPADM

## 2022-04-23 RX ADMIN — CEFEPIME HYDROCHLORIDE 1 G: 1 INJECTION, POWDER, FOR SOLUTION INTRAMUSCULAR; INTRAVENOUS at 20:13

## 2022-04-23 RX ADMIN — BUMETANIDE 4 MG: 0.25 INJECTION, SOLUTION INTRAMUSCULAR; INTRAVENOUS at 22:43

## 2022-04-23 RX ADMIN — KIT FOR THE PREPARATION OF TECHNETIUM TC 99M ALBUMIN AGGREGATED 6.3 MILLICURIE: 2.5 INJECTION, POWDER, FOR SOLUTION INTRAVENOUS at 21:29

## 2022-04-23 RX ADMIN — ACETAMINOPHEN 650 MG: 325 TABLET ORAL at 19:02

## 2022-04-23 RX ADMIN — FUROSEMIDE 40 MG: 10 INJECTION, SOLUTION INTRAMUSCULAR; INTRAVENOUS at 19:50

## 2022-04-23 RX ADMIN — LORAZEPAM 1 MG: 2 INJECTION INTRAMUSCULAR; INTRAVENOUS at 19:02

## 2022-04-23 RX ADMIN — HEPARIN SODIUM AND DEXTROSE 1650 UNITS/HR: 10000; 5 INJECTION INTRAVENOUS at 20:58

## 2022-04-23 RX ADMIN — VANCOMYCIN HYDROCHLORIDE 2000 MG: 1 INJECTION, POWDER, LYOPHILIZED, FOR SOLUTION INTRAVENOUS at 20:50

## 2022-04-23 ASSESSMENT — ENCOUNTER SYMPTOMS
FEVER: 1
LIGHT-HEADEDNESS: 1
SHORTNESS OF BREATH: 1
COUGH: 1
DIFFICULTY URINATING: 0

## 2022-04-23 ASSESSMENT — ACTIVITIES OF DAILY LIVING (ADL)
WALKING_OR_CLIMBING_STAIRS_DIFFICULTY: NO
DIFFICULTY_EATING/SWALLOWING: NO
DRESSING/BATHING_DIFFICULTY: NO
ADLS_ACUITY_SCORE: 4
CONCENTRATING,_REMEMBERING_OR_MAKING_DECISIONS_DIFFICULTY: NO
ADLS_ACUITY_SCORE: 7
FALL_HISTORY_WITHIN_LAST_SIX_MONTHS: NO
TOILETING_ISSUES: NO
DOING_ERRANDS_INDEPENDENTLY_DIFFICULTY: NO
CHANGE_IN_FUNCTIONAL_STATUS_SINCE_ONSET_OF_CURRENT_ILLNESS/INJURY: NO
ADLS_ACUITY_SCORE: 7
WEAR_GLASSES_OR_BLIND: YES
ADLS_ACUITY_SCORE: 7

## 2022-04-23 NOTE — ED TRIAGE NOTES
Yesterday, pt developed shortness of breath, light-headedness, fatigue, etc. She is also febrile on arrival to ER. Pt states that she has been checking her oxygen saturation at home and it has been falling to 88%. She is consistently 92% in triage.

## 2022-04-23 NOTE — PROGRESS NOTES
Patient dyspnic and placed on BiPAP14/8 RR 12 Fio2 50%.  Patient states relief with BiPAP.  Omega Nair, RT
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
## 2022-04-24 LAB
ALBUMIN SERPL-MCNC: 2.6 G/DL (ref 3.5–5)
ALBUMIN UR-MCNC: 200 MG/DL
ANION GAP SERPL CALCULATED.3IONS-SCNC: 16 MMOL/L (ref 5–18)
APPEARANCE UR: CLEAR
BILIRUB UR QL STRIP: NEGATIVE
BUN SERPL-MCNC: 71 MG/DL (ref 8–22)
CALCIUM SERPL-MCNC: 6.9 MG/DL (ref 8.5–10.5)
CHLORIDE BLD-SCNC: 100 MMOL/L (ref 98–107)
CO2 SERPL-SCNC: 17 MMOL/L (ref 22–31)
COLOR UR AUTO: COLORLESS
CREAT SERPL-MCNC: 9.05 MG/DL (ref 0.6–1.1)
ERYTHROCYTE [DISTWIDTH] IN BLOOD BY AUTOMATED COUNT: 15.7 % (ref 10–15)
GFR SERPL CREATININE-BSD FRML MDRD: 5 ML/MIN/1.73M2
GLUCOSE BLD-MCNC: 93 MG/DL (ref 70–125)
GLUCOSE BLDC GLUCOMTR-MCNC: 120 MG/DL (ref 70–99)
GLUCOSE BLDC GLUCOMTR-MCNC: 129 MG/DL (ref 70–99)
GLUCOSE BLDC GLUCOMTR-MCNC: 154 MG/DL (ref 70–99)
GLUCOSE BLDC GLUCOMTR-MCNC: 57 MG/DL (ref 70–99)
GLUCOSE BLDC GLUCOMTR-MCNC: 97 MG/DL (ref 70–99)
GLUCOSE UR STRIP-MCNC: NEGATIVE MG/DL
HCT VFR BLD AUTO: 28.5 % (ref 35–47)
HGB BLD-MCNC: 8.5 G/DL (ref 11.7–15.7)
HGB UR QL STRIP: ABNORMAL
KETONES UR STRIP-MCNC: NEGATIVE MG/DL
LEUKOCYTE ESTERASE UR QL STRIP: NEGATIVE
MAGNESIUM SERPL-MCNC: 1.6 MG/DL (ref 1.8–2.6)
MCH RBC QN AUTO: 24.4 PG (ref 26.5–33)
MCHC RBC AUTO-ENTMCNC: 29.8 G/DL (ref 31.5–36.5)
MCV RBC AUTO: 82 FL (ref 78–100)
NITRATE UR QL: NEGATIVE
PH UR STRIP: 6.5 [PH] (ref 5–7)
PHOSPHATE SERPL-MCNC: 6.7 MG/DL (ref 2.5–4.5)
PLATELET # BLD AUTO: 262 10E3/UL (ref 150–450)
POTASSIUM BLD-SCNC: 4.4 MMOL/L (ref 3.5–5)
RBC # BLD AUTO: 3.49 10E6/UL (ref 3.8–5.2)
RBC URINE: 20 /HPF
SODIUM SERPL-SCNC: 133 MMOL/L (ref 136–145)
SP GR UR STRIP: 1.01 (ref 1–1.03)
SQUAMOUS EPITHELIAL: 1 /HPF
UROBILINOGEN UR STRIP-MCNC: <2 MG/DL
WBC # BLD AUTO: 9.2 10E3/UL (ref 4–11)
WBC URINE: 3 /HPF

## 2022-04-24 PROCEDURE — 36415 COLL VENOUS BLD VENIPUNCTURE: CPT | Performed by: STUDENT IN AN ORGANIZED HEALTH CARE EDUCATION/TRAINING PROGRAM

## 2022-04-24 PROCEDURE — 250N000011 HC RX IP 250 OP 636: Performed by: STUDENT IN AN ORGANIZED HEALTH CARE EDUCATION/TRAINING PROGRAM

## 2022-04-24 PROCEDURE — 210N000001 HC R&B IMCU HEART CARE

## 2022-04-24 PROCEDURE — 85014 HEMATOCRIT: CPT | Performed by: INTERNAL MEDICINE

## 2022-04-24 PROCEDURE — 99233 SBSQ HOSP IP/OBS HIGH 50: CPT | Performed by: INTERNAL MEDICINE

## 2022-04-24 PROCEDURE — 94640 AIRWAY INHALATION TREATMENT: CPT

## 2022-04-24 PROCEDURE — 81003 URINALYSIS AUTO W/O SCOPE: CPT | Performed by: EMERGENCY MEDICINE

## 2022-04-24 PROCEDURE — 999N000157 HC STATISTIC RCP TIME EA 10 MIN

## 2022-04-24 PROCEDURE — 99253 IP/OBS CNSLTJ NEW/EST LOW 45: CPT | Mod: 24 | Performed by: INTERNAL MEDICINE

## 2022-04-24 PROCEDURE — 80069 RENAL FUNCTION PANEL: CPT | Performed by: INTERNAL MEDICINE

## 2022-04-24 PROCEDURE — 250N000011 HC RX IP 250 OP 636: Performed by: INTERNAL MEDICINE

## 2022-04-24 PROCEDURE — 94640 AIRWAY INHALATION TREATMENT: CPT | Mod: 76

## 2022-04-24 PROCEDURE — 250N000013 HC RX MED GY IP 250 OP 250 PS 637: Performed by: INTERNAL MEDICINE

## 2022-04-24 PROCEDURE — 83735 ASSAY OF MAGNESIUM: CPT | Performed by: STUDENT IN AN ORGANIZED HEALTH CARE EDUCATION/TRAINING PROGRAM

## 2022-04-24 PROCEDURE — 250N000009 HC RX 250

## 2022-04-24 PROCEDURE — 250N000013 HC RX MED GY IP 250 OP 250 PS 637: Performed by: STUDENT IN AN ORGANIZED HEALTH CARE EDUCATION/TRAINING PROGRAM

## 2022-04-24 PROCEDURE — 250N000013 HC RX MED GY IP 250 OP 250 PS 637

## 2022-04-24 RX ORDER — METOLAZONE 5 MG/1
5 TABLET ORAL
Status: COMPLETED | OUTPATIENT
Start: 2022-04-24 | End: 2022-04-24

## 2022-04-24 RX ORDER — ALBUTEROL SULFATE 0.83 MG/ML
2.5 SOLUTION RESPIRATORY (INHALATION) EVERY 6 HOURS PRN
Status: DISCONTINUED | OUTPATIENT
Start: 2022-04-24 | End: 2022-04-30 | Stop reason: HOSPADM

## 2022-04-24 RX ORDER — BUMETANIDE 0.25 MG/ML
5 INJECTION INTRAMUSCULAR; INTRAVENOUS
Status: DISCONTINUED | OUTPATIENT
Start: 2022-04-24 | End: 2022-04-29

## 2022-04-24 RX ORDER — CLONIDINE HYDROCHLORIDE 0.1 MG/1
0.1 TABLET ORAL 4 TIMES DAILY PRN
Status: DISCONTINUED | OUTPATIENT
Start: 2022-04-24 | End: 2022-04-30 | Stop reason: HOSPADM

## 2022-04-24 RX ORDER — CALCIUM CARBONATE 500 MG/1
500 TABLET, CHEWABLE ORAL
Status: DISCONTINUED | OUTPATIENT
Start: 2022-04-24 | End: 2022-04-30 | Stop reason: HOSPADM

## 2022-04-24 RX ORDER — DOXAZOSIN 1 MG/1
2 TABLET ORAL 2 TIMES DAILY
Status: DISCONTINUED | OUTPATIENT
Start: 2022-04-24 | End: 2022-04-30 | Stop reason: HOSPADM

## 2022-04-24 RX ADMIN — HYDRALAZINE HYDROCHLORIDE 50 MG: 50 TABLET, FILM COATED ORAL at 08:47

## 2022-04-24 RX ADMIN — BUMETANIDE 5 MG: 0.25 INJECTION, SOLUTION INTRAMUSCULAR; INTRAVENOUS at 10:18

## 2022-04-24 RX ADMIN — BUMETANIDE 5 MG: 0.25 INJECTION, SOLUTION INTRAMUSCULAR; INTRAVENOUS at 17:17

## 2022-04-24 RX ADMIN — CALCIUM CARBONATE (ANTACID) CHEW TAB 500 MG 500 MG: 500 CHEW TAB at 11:50

## 2022-04-24 RX ADMIN — METOLAZONE 5 MG: 5 TABLET ORAL at 17:16

## 2022-04-24 RX ADMIN — HYDRALAZINE HYDROCHLORIDE 50 MG: 50 TABLET, FILM COATED ORAL at 14:46

## 2022-04-24 RX ADMIN — AMLODIPINE BESYLATE 10 MG: 5 TABLET ORAL at 08:47

## 2022-04-24 RX ADMIN — HYDRALAZINE HYDROCHLORIDE 50 MG: 50 TABLET, FILM COATED ORAL at 20:28

## 2022-04-24 RX ADMIN — CARVEDILOL 25 MG: 12.5 TABLET, FILM COATED ORAL at 08:46

## 2022-04-24 RX ADMIN — HYDRALAZINE HYDROCHLORIDE 50 MG: 50 TABLET, FILM COATED ORAL at 00:00

## 2022-04-24 RX ADMIN — ALBUTEROL SULFATE 2.5 MG: 2.5 SOLUTION RESPIRATORY (INHALATION) at 04:28

## 2022-04-24 RX ADMIN — ACETAMINOPHEN 650 MG: 325 TABLET ORAL at 03:52

## 2022-04-24 RX ADMIN — CALCIUM CARBONATE (ANTACID) CHEW TAB 500 MG 500 MG: 500 CHEW TAB at 17:16

## 2022-04-24 RX ADMIN — DOXAZOSIN 2 MG: 1 TABLET ORAL at 20:29

## 2022-04-24 RX ADMIN — DOXAZOSIN 2 MG: 1 TABLET ORAL at 08:47

## 2022-04-24 RX ADMIN — METOLAZONE 5 MG: 5 TABLET ORAL at 10:25

## 2022-04-24 RX ADMIN — CARVEDILOL 25 MG: 12.5 TABLET, FILM COATED ORAL at 17:16

## 2022-04-24 RX ADMIN — CEFEPIME HYDROCHLORIDE 1 G: 1 INJECTION, POWDER, FOR SOLUTION INTRAMUSCULAR; INTRAVENOUS at 20:30

## 2022-04-24 RX ADMIN — ACETAMINOPHEN 650 MG: 325 TABLET ORAL at 20:28

## 2022-04-24 ASSESSMENT — ACTIVITIES OF DAILY LIVING (ADL)
ADLS_ACUITY_SCORE: 4
DEPENDENT_IADLS:: INDEPENDENT
ADLS_ACUITY_SCORE: 4

## 2022-04-24 ASSESSMENT — ENCOUNTER SYMPTOMS
BRUISES/BLEEDS EASILY: 0
ABDOMINAL PAIN: 0
MYALGIAS: 0
CONFUSION: 0

## 2022-04-24 NOTE — PHARMACY-ADMISSION MEDICATION HISTORY
Pharmacy Note - Admission Medication History    Pertinent Provider Information:       ______________________________________________________________________    Prior To Admission (PTA) med list completed and updated in EMR.       PTA Med List   Medication Sig Last Dose     amLODIPine (NORVASC) 10 MG tablet Take 1 tablet (10 mg) by mouth daily 4/22/2022 at Unknown time     aspirin (ASA) 81 MG EC tablet Take 1 tablet (81 mg) by mouth daily 4/22/2022 at Unknown time     calcitRIOL (ROCALTROL) 0.25 MCG capsule Take 1 capsule (0.25 mcg) by mouth three times a week (Patient taking differently: Take 0.25 mcg by mouth three times a week Mon-Wed-Fri) 4/22/2022 at Unknown time     carvedilol (COREG) 25 MG tablet Take 1 tablet (25 mg) by mouth 2 times daily (with meals) 4/22/2022 at Unknown time     doxazosin (CARDURA) 1 MG tablet Take 1 tablet (1 mg) by mouth every 12 hours (Patient taking differently: Take 2 mg by mouth daily) 4/22/2022 at Unknown time     glipiZIDE (GLUCOTROL XL) 5 MG 24 hr tablet Take 5 mg by mouth daily 4/22/2022 at Unknown time     hydrALAZINE (APRESOLINE) 50 MG tablet Take 1 tablet (50 mg) by mouth 3 times daily 4/22/2022 at Unknown time     sodium bicarbonate 650 MG tablet Take 1 tablet (650 mg) by mouth 2 times daily 4/22/2022 at Unknown time     torsemide (DEMADEX) 20 MG tablet Take 40 mg by mouth daily 4/22/2022 at Unknown time       Information source(s): Patient and CareEverywhere/SureScripts  Method of interview communication: in-person    Summary of Changes to PTA Med List  New: Torsemide  Discontinued: Oxycodone  Changed: Doxazosin    Patient was asked about OTC/herbal products specifically.  PTA med list reflects this.    In the past week, patient estimated taking medication this percent of the time:  greater than 90%.    Allergies were reviewed, assessed, and updated with the patient.      Patient does not use any multi-dose medications prior to admission.    The information provided in this  note is only as accurate as the sources available at the time of the update(s).    Thank you for the opportunity to participate in the care of this patient.    Antonio Greer RPH  4/23/2022 8:21 PM

## 2022-04-24 NOTE — PLAN OF CARE
Goal Outcome Evaluation:     Denies pain. Ambulates IND in room. Fistula intact to left arm. Pt to be NPO at midnight for possible CVC placement by IR to start dialysis. TELE NSR. Remains on 5L oxygen via NC.     Mag and K protocols are rechecks in AM.

## 2022-04-24 NOTE — PROGRESS NOTES
[facility].       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   Now presents with sob and fever and moderate HTN.   Possible infiltrate on CXR, severe hypoxia initially but now improving O2 needs.   Discussed with ER staff.   Favor rx for infx and avoid IV contrast if possible, or empirically anticoagulate to avoid need for emergent HD tonight.  Will give bumex. Just under a month since AVF placed, probably not ready for use but will evaluate.   I will see her in early am.   Call if clinical change overnoc.    Sana Alonzo MD  [facility]  [phone]

## 2022-04-24 NOTE — PHARMACY-VANCOMYCIN DOSING SERVICE
Pharmacy Vancomycin Initial Note  Date of Service 2022  Patient's  1985  36 year old, female    Indication: Intra-abdominal infection    Current estimated CrCl = Estimated Creatinine Clearance: 9 mL/min (A) (based on SCr of 8.87 mg/dL (HH)).    Creatinine for last 3 days  2022:  6:36 PM Creatinine 8.87 mg/dL    Recent Vancomycin Level(s) for last 3 days  No results found for requested labs within last 72 hours.      Vancomycin IV Administrations (past 72 hours)      No vancomycin orders with administrations in past 72 hours.                Nephrotoxins and other renal medications (From now, onward)    Start     Dose/Rate Route Frequency Ordered Stop    22  vancomycin (VANCOCIN) 2,000 mg in sodium chloride 0.9 % 500 mL intermittent infusion         2,000 mg  over 2 Hours Intravenous ONCE 22  bumetanide (BUMEX) injection 4 mg         4 mg Intravenous ONCE 22            Contrast Orders - past 72 hours (72h ago, onward)    None                Plan:  1. Start vancomycin 2000 mg IV once in emergency department.   2. Please re-consult pharmacy if continuing as inpatient.     Antonio Greer RPH
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
## 2022-04-24 NOTE — PLAN OF CARE
Pt's BP was elevated at 0000 but had no BP meds available. Dr. Quiles ordered a one time dose of oral hydralazine since that is what she will start taking this am. Pt stated that she has shortness of breath and dyspnea on exertion. She is on 5L of O2 via nasal canula. Her VQ scan of her lungs came back normal and her ultrasound of her BLE was negative for clots. Dr. Retana was updated about this and he discontinued her heparin drip. The bruit and thrill are present to her left arm fistula. She stated that this fistula is new so she hasn't stated dialysis with it yet. At 0330 she called the nurse and stated that she felt more short of breath. Her temp at this time was 102.7 and her BP was 182/85. Dr. Quiles was updated. She stated to give her the PRN tylenol and ordered a PRN nebulizer. She didn't want to draw a lactic acid because she was already on antibiotics and she wouldn't want to give her fluids. Her temp recheck was 101 and BP was 158/73. She did receive the PRN neb which seemed to make her more comfortable. She is able to make needs known. Call light is within reach.

## 2022-04-24 NOTE — PHARMACY-VANCOMYCIN DOSING SERVICE
Pharmacy Vancomycin Initial Note  Date of Service 2022  Patient's  1985  36 year old, female  Indication: Sepsis  Current estimated CrCl = Estimated Creatinine Clearance: 9 mL/min (A) (based on SCr of 8.87 mg/dL (HH)).  Creatinine for last 3 days  2022:  6:36 PM Creatinine 8.87 mg/dL    Recent Vancomycin Level(s) for last 3 days  No results found for requested labs within last 72 hours.      Vancomycin IV Administrations (past 72 hours)                   vancomycin (VANCOCIN) 2,000 mg in sodium chloride 0.9 % 500 mL intermittent infusion (mg) 2,000 mg New Bag 22                Nephrotoxins and other renal medications (From now, onward)    Start     Dose/Rate Route Frequency Ordered Stop    22  vancomycin place bahena - receiving intermittent dosing         1 each Intravenous SEE ADMIN INSTRUCTIONS 22 230            Contrast Orders - past 72 hours (72h ago, onward)    Start     Dose/Rate Route Frequency Stop    22  technetium pertechnetate with albumin (Tc99m MAA) radioisotope injection 6-7 millicurie         6-7 millicurie Intravenous ONCE 22          Insight[employer] Prediction of Planned Initial Vancomycin Regimen  Not appropriate - data has been entered         Plan:  1. Give vancomycin  2000 mg IV x 1 in ed, further dosing per random drug levels due to extremely elevated SCR, CKD V, not on dialysis    2. Vancomycin monitoring method: Trough (Method 1 = dosing nomogram)  3. Vancomycin therapeutic monitoring goal: 15-20 mg/L  4. Pharmacy will check vancomycin levels as appropriate in 1-3 Days.    5. Serum creatinine levels will be ordered daily for the first week of therapy and at least twice weekly for subsequent weeks.      Becca Sandoval, [facility]
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
## 2022-04-24 NOTE — PROGRESS NOTES
Patient was taken off Bipap and put on High Flow nasal cannula per MD orders. Patient was put on 30L 50%. Sating 100%. Titrated fio2 down to 30% and patient was still sating 100%. RT then took patient off HFNC and now on 8L oxymask. Sats are still 100%. Patient stating her breathing is back to normal and does not feel SOB. Bipap and HFNC in room on standby if needed. RT will continue to monitor.     Gabino Hernandez, RT

## 2022-04-24 NOTE — PROGRESS NOTES
[facility]    Medicine Progress Note - Hospitalist Service    Date of Admission:  4/23/2022    Assessment & Plan        Alysia Hernandez is a 36-year-old woman, former smoker with history of type 2 diabetes, stage V kidney disease secondary to diabetic nephropathy, hypertension and morbid obesity admitted on 4/23/2020 due to pulmonary edema in the setting of stage V kidney disease and suspected sepsis possibly secondary to pneumonia.    She recently underwent AV fistula creation about a month ago.  Nephrologist recommended diuretic therapy and dialysis catheter placement by interventional radiologist on 4/25/2022 with plan to start dialysis afterwards.    She was placed on cefepime and vancomycin on admission for suspected sepsis.  VQ scan was negative for pulmonary embolism and lower extremity ultrasound was negative for DVT.    Stage V kidney disease  Pulmonary edema  Left arm AV fistula placed on 3/28/22  Hypocalcemia  Continue bumetanide 5 mg IV twice daily  Metolazone 5 mg twice daily  Continue calcitriol 0.25 mcg 3 times weekly  Continue calcium carbonate  Dialysis catheter placement as per IR  Will possibly start dialysis on 4/25/2022.  Monitor BMP  Nephrology [id number] assistance    Anemia of chronic disease  Received epo on 4/19/22.   Monitor hgb    Sepsis  Unclear whether this is due to pneumonia versus intra-abdominal infection.  Denies LUTS  Continue cefepime and vancomycin; started 4/23/2022  Follow-up SSL for MRSA  Follow-up blood culture    Hypertension  Blood pressure is controlled at this time  Continue amlodipine 10 mg daily  Continue carvedilol 25 mg twice daily  Continue hydralazine 50 mg 3 times daily  Clonidine as needed    Type 2 diabetes  Hypoglycemic this morning with glucose level 57; glucose is in the acceptable range at this time  Continue to hold PTA glipizide  Continue insulin sliding scale     Diet: Fluid restriction 2000 ML FLUID  Combination Diet  Moderate Consistent Carb (60 g CHO per Meal) Diet  NPO for Medical/Clinical Reasons Except for: Meds, Ice Chips    DVT Prophylaxis: Pneumatic Compression Devices  Rendon Catheter: Not present  Central Lines: None  Cardiac Monitoring: ACTIVE order. Indication: sepsis, ersd - yet to start dialysis  Code Status: Full Code      Disposition Plan   Expected Discharge:  2-3 days    Anticipated discharge location:  Awaiting care coordination huddle  Delays:     Blood culture, need for IV antibiotics, dialysis, nephrology recommendations        The patient's care was discussed with the Patient.     Fidencio Parkinson MD  Hospitalist Service  [facility]  Securely message with the Vocera Web Console (learn more here)  Text page via [employer] Paging/Directory         Clinically Significant Risk Factors Present on Admission          # Hypocalcemia: corrected calcium <8.5 on admission, will replace as needed   # Anion Gap Metabolic Acidosis: AG = 16 mmol/L (Ref range: 5 - 18 mmol/L) on admission, will monitor and treat as appropriate  # Hypoalbuminemia: Albumin = 2.6 g/dL (Ref range: 3.5 - 5.0 g/dL) on admission, will monitor as appropriate    # Platelet Defect: home medication list includes an antiplatelet medication   # Obesity: Estimated body mass index is 39.8 kg/m  as calculated from the following:    Height as of this encounter: 1.524 m (5').    Weight as of this encounter: 92.4 kg (203 lb 12.8 oz).      ______________________________________________________________________    Interval History   No new complaints today. She states she feels tired. Had SOB earlier, denies cough, chest pain or LUTS.     Data reviewed today: I reviewed all medications, new labs and imaging results over the last 24 hours. I personally reviewed the lung perfusion scan image(s) showing normal .    Physical Exam   Vital Signs: Temp: 99.1  F (37.3  C) Temp src: Oral BP: 139/65 Pulse: 85   Resp: 16 SpO2: 100 % O2 Device: Nasal  cannula Oxygen Delivery: 5 LPM  Weight: 203 lbs 12.8 oz  Physical Exam  General appearance: Awake, Alert, Cooperative, obese, not in any obvious distress and appears stated age   HEENT: Normocephalic, atraumatic, conjunctiva clear without icterus and ears without discharge  Lungs: Mild crackles in the right lung base.  Cardiovascular: Regular Rate and Rythm, normal apical impulse, normal S1 and S2, 1-2+ lower extremity edema bilaterally  Abdomen: Soft, non-tender and Non-distended, active bowel sounds  Skin: Hyperpigmented nodular lesion in the left supraorbital region.  Musculoskeletal: No bony deformities or joint tenderness. Normal ROM upon flexion & extension.   Neurologic: Alert & Oriented X 3, Facial symmetry preserved and upper & lower extremities moving well with symmetry  Psychiatric: Calm, normal eye contact and normal affect      Data   Recent Results (from the past 24 hour(s))   XR Chest Port 1 View    Narrative    EXAM: XR CHEST PORT 1 VIEW  LOCATION: [facility]  DATE/TIME: 4/23/2022 6:39 PM    INDICATION: fever, cough. ESRD, on bipap  COMPARISON: None.      Impression    IMPRESSION: Heart size exaggerated by technique. New mild opacity in the left lower lobe is indeterminant for mild atelectasis or infiltrate. No definite pulmonary edema.   NM Lung Scan Perfusion Particulate    Narrative    EXAM: NM LUNG SCAN PERFUSION PARTICULATE  LOCATION: [facility]  DATE/TIME: 4/23/2022 9:27 PM    INDICATION: PE suspected, low/intermediate prob, neg D-dimer. Acute hypoxic respiratory failure  COMPARISON: Chest x-ray 04/23/2022  TECHNIQUE: 6.3 mCi technetium-99m MAA, IV. Standard lung perfusion imaging.    FINDINGS: Normal perfusion to both lungs. No segmental perfusion defects.      Impression    IMPRESSION:   1.  Normal lung perfusion scan.   US Lower Extremity Venous Duplex Bilateral    Narrative    EXAM: US LOWER EXTREMITY VENOUS DUPLEX BILATERAL  LOCATION:   [facility]  DATE/TIME: 4/23/2022 9:56 PM    INDICATION: swelling  COMPARISON: None.  TECHNIQUE: Venous Duplex ultrasound of bilateral lower extremities with and without compression, augmentation and duplex. Color flow and spectral Doppler with waveform analysis performed.    FINDINGS: Exam includes the common femoral, femoral, popliteal veins as well as segmentally visualized deep calf veins and greater saphenous vein.     RIGHT: No deep vein thrombosis. No superficial thrombophlebitis. No popliteal cyst.    LEFT: No deep vein thrombosis. No superficial thrombophlebitis. No popliteal cyst.      Impression    IMPRESSION:  1.  No deep venous thrombosis in the bilateral lower extremities.

## 2022-04-24 NOTE — PROGRESS NOTES
RENAL  Consent for HD discussed/ obtained and is on chart.   HD ordered 4/25.  Sana Alonzo MD  [facility]  [phone]

## 2022-04-24 NOTE — CONSULTS
Care Management Initial Consult    General Information  Assessment completed with: Patient, patient  Type of CM/SW Visit: Initial Assessment    Primary Care Provider verified and updated as needed:     Readmission within the last 30 days:           Advance Care Planning:            Communication Assessment  Patient's communication style: spoken language (English or Bilingual)    Hearing Difficulty or Deaf: no   Wear Glasses or Blind: yes    Cognitive  Cognitive/Neuro/Behavioral: WDL                      Living Environment:   People in home: alone     Current living Arrangements: house      Able to return to prior arrangements: yes       Family/Social Support:  Care provided by: self  Provides care for: no one  Marital Status: Single  Sibling(s)          Description of Support System: Supportive    Support Assessment: Adequate family and caregiver support    Current Resources:   Patient receiving home care services: No     Community Resources: None  Equipment currently used at home: none  Supplies currently used at home: None    Employment/Financial:  Employment Status: employed full-time        Financial Concerns:     Referral to Financial Worker: No       Lifestyle & Psychosocial Needs:  Social Determinants of Health     Tobacco Use: Medium Risk     Smoking Tobacco Use: Former Smoker     Smokeless Tobacco Use: Never Used   Alcohol Use: Not on file   Financial Resource Strain: Not on file   Food Insecurity: Not on file   Transportation Needs: Not on file   Physical Activity: Not on file   Stress: Not on file   Social Connections: Not on file   Intimate Partner Violence: Not on file   Depression: Not at risk     PHQ-2 Score: 0   Housing Stability: Not on file       Functional Status:  Prior to admission patient needed assistance:   Dependent ADLs:: Independent  Dependent IADLs:: Independent    Additional Information:  Chart reviewed and met with patient in her room to introduce self, CM role and complete initial  assessment. Pt lives alone, independent at baseline, works from home full time.   Nephrology following, Will need to arrange HD outpt at either [facility] or [facility].  Pt identifies her sister Noemi as supportive.   CM will follow for discharge planning assist.    JANETTE Farmer

## 2022-04-25 LAB
ANION GAP SERPL CALCULATED.3IONS-SCNC: 15 MMOL/L (ref 5–18)
BUN SERPL-MCNC: 73 MG/DL (ref 8–22)
CALCIUM SERPL-MCNC: 7 MG/DL (ref 8.5–10.5)
CHLORIDE BLD-SCNC: 101 MMOL/L (ref 98–107)
CO2 SERPL-SCNC: 18 MMOL/L (ref 22–31)
CREAT SERPL-MCNC: 9.57 MG/DL (ref 0.6–1.1)
DEPRECATED CALCIDIOL+CALCIFEROL SERPL-MC: 9 UG/L (ref 20–75)
GFR SERPL CREATININE-BSD FRML MDRD: 5 ML/MIN/1.73M2
GLUCOSE BLD-MCNC: 101 MG/DL (ref 70–125)
GLUCOSE BLDC GLUCOMTR-MCNC: 111 MG/DL (ref 70–99)
GLUCOSE BLDC GLUCOMTR-MCNC: 135 MG/DL (ref 70–99)
GLUCOSE BLDC GLUCOMTR-MCNC: 78 MG/DL (ref 70–99)
GLUCOSE BLDC GLUCOMTR-MCNC: 85 MG/DL (ref 70–99)
GLUCOSE BLDC GLUCOMTR-MCNC: 86 MG/DL (ref 70–99)
HBV CORE AB SERPL QL IA: NEGATIVE
HBV SURFACE AB SERPLBLD QL IA.RAPID: POSITIVE
HBV SURFACE AG SERPL QL IA: NONREACTIVE
MAGNESIUM SERPL-MCNC: 1.6 MG/DL (ref 1.8–2.6)
POTASSIUM BLD-SCNC: 4.5 MMOL/L (ref 3.5–5)
PTH-INTACT SERPL-MCNC: 539 PG/ML (ref 10–86)
SODIUM SERPL-SCNC: 134 MMOL/L (ref 136–145)
VANCOMYCIN SERPL-MCNC: 31.4 MG/L

## 2022-04-25 PROCEDURE — 87040 BLOOD CULTURE FOR BACTERIA: CPT | Performed by: INTERNAL MEDICINE

## 2022-04-25 PROCEDURE — 83735 ASSAY OF MAGNESIUM: CPT | Performed by: INTERNAL MEDICINE

## 2022-04-25 PROCEDURE — 99254 IP/OBS CNSLTJ NEW/EST MOD 60: CPT | Performed by: INTERNAL MEDICINE

## 2022-04-25 PROCEDURE — 250N000013 HC RX MED GY IP 250 OP 250 PS 637: Performed by: INTERNAL MEDICINE

## 2022-04-25 PROCEDURE — 250N000011 HC RX IP 250 OP 636: Performed by: STUDENT IN AN ORGANIZED HEALTH CARE EDUCATION/TRAINING PROGRAM

## 2022-04-25 PROCEDURE — 99233 SBSQ HOSP IP/OBS HIGH 50: CPT | Mod: 24 | Performed by: INTERNAL MEDICINE

## 2022-04-25 PROCEDURE — 250N000009 HC RX 250: Performed by: PLASTIC SURGERY

## 2022-04-25 PROCEDURE — G0463 HOSPITAL OUTPT CLINIC VISIT: HCPCS

## 2022-04-25 PROCEDURE — 210N000001 HC R&B IMCU HEART CARE

## 2022-04-25 PROCEDURE — 36415 COLL VENOUS BLD VENIPUNCTURE: CPT | Performed by: INTERNAL MEDICINE

## 2022-04-25 PROCEDURE — 80048 BASIC METABOLIC PNL TOTAL CA: CPT | Performed by: INTERNAL MEDICINE

## 2022-04-25 PROCEDURE — 250N000011 HC RX IP 250 OP 636: Performed by: INTERNAL MEDICINE

## 2022-04-25 PROCEDURE — 80202 ASSAY OF VANCOMYCIN: CPT | Performed by: INTERNAL MEDICINE

## 2022-04-25 PROCEDURE — 250N000013 HC RX MED GY IP 250 OP 250 PS 637: Performed by: STUDENT IN AN ORGANIZED HEALTH CARE EDUCATION/TRAINING PROGRAM

## 2022-04-25 PROCEDURE — 99233 SBSQ HOSP IP/OBS HIGH 50: CPT | Performed by: INTERNAL MEDICINE

## 2022-04-25 PROCEDURE — 99253 IP/OBS CNSLTJ NEW/EST LOW 45: CPT | Performed by: PLASTIC SURGERY

## 2022-04-25 PROCEDURE — 83970 ASSAY OF PARATHORMONE: CPT | Performed by: INTERNAL MEDICINE

## 2022-04-25 RX ORDER — HEPARIN SODIUM 1000 [USP'U]/ML
500 INJECTION, SOLUTION INTRAVENOUS; SUBCUTANEOUS CONTINUOUS
Status: DISCONTINUED | OUTPATIENT
Start: 2022-04-25 | End: 2022-04-30 | Stop reason: HOSPADM

## 2022-04-25 RX ORDER — SODIUM BICARBONATE 650 MG/1
1300 TABLET ORAL 3 TIMES DAILY
Status: DISCONTINUED | OUTPATIENT
Start: 2022-04-25 | End: 2022-04-30 | Stop reason: HOSPADM

## 2022-04-25 RX ORDER — VITAMIN B COMPLEX
50 TABLET ORAL DAILY
Status: DISCONTINUED | OUTPATIENT
Start: 2022-04-25 | End: 2022-04-30 | Stop reason: HOSPADM

## 2022-04-25 RX ORDER — SILVER SULFADIAZINE 10 MG/G
CREAM TOPICAL DAILY
Status: DISCONTINUED | OUTPATIENT
Start: 2022-04-25 | End: 2022-04-30 | Stop reason: HOSPADM

## 2022-04-25 RX ORDER — FLUMAZENIL 0.1 MG/ML
0.2 INJECTION, SOLUTION INTRAVENOUS
Status: DISCONTINUED | OUTPATIENT
Start: 2022-04-25 | End: 2022-04-25

## 2022-04-25 RX ADMIN — HYDRALAZINE HYDROCHLORIDE 50 MG: 50 TABLET, FILM COATED ORAL at 22:17

## 2022-04-25 RX ADMIN — DOXAZOSIN 2 MG: 1 TABLET ORAL at 22:17

## 2022-04-25 RX ADMIN — HYDRALAZINE HYDROCHLORIDE 50 MG: 50 TABLET, FILM COATED ORAL at 15:57

## 2022-04-25 RX ADMIN — CALCIUM CARBONATE (ANTACID) CHEW TAB 500 MG 500 MG: 500 CHEW TAB at 15:57

## 2022-04-25 RX ADMIN — CARVEDILOL 25 MG: 12.5 TABLET, FILM COATED ORAL at 19:41

## 2022-04-25 RX ADMIN — CALCITRIOL 0.25 MCG: 0.25 CAPSULE ORAL at 11:51

## 2022-04-25 RX ADMIN — SILVER SULFADIAZINE: 10 CREAM TOPICAL at 19:43

## 2022-04-25 RX ADMIN — ACETAMINOPHEN 650 MG: 325 TABLET ORAL at 23:42

## 2022-04-25 RX ADMIN — ACETAMINOPHEN 650 MG: 325 TABLET ORAL at 04:52

## 2022-04-25 RX ADMIN — ONDANSETRON 4 MG: 2 INJECTION INTRAMUSCULAR; INTRAVENOUS at 18:08

## 2022-04-25 RX ADMIN — CALCIUM CARBONATE (ANTACID) CHEW TAB 500 MG 500 MG: 500 CHEW TAB at 07:03

## 2022-04-25 RX ADMIN — SODIUM BICARBONATE 1300 MG: 648 TABLET ORAL at 15:57

## 2022-04-25 RX ADMIN — CEFEPIME HYDROCHLORIDE 1 G: 1 INJECTION, POWDER, FOR SOLUTION INTRAMUSCULAR; INTRAVENOUS at 20:26

## 2022-04-25 RX ADMIN — DOXAZOSIN 2 MG: 1 TABLET ORAL at 11:52

## 2022-04-25 RX ADMIN — BUMETANIDE 5 MG: 0.25 INJECTION, SOLUTION INTRAMUSCULAR; INTRAVENOUS at 18:12

## 2022-04-25 RX ADMIN — CALCIUM CARBONATE (ANTACID) CHEW TAB 500 MG 500 MG: 500 CHEW TAB at 11:51

## 2022-04-25 RX ADMIN — AMLODIPINE BESYLATE 10 MG: 5 TABLET ORAL at 11:51

## 2022-04-25 RX ADMIN — SODIUM BICARBONATE 1300 MG: 648 TABLET ORAL at 22:18

## 2022-04-25 RX ADMIN — HYDRALAZINE HYDROCHLORIDE 50 MG: 50 TABLET, FILM COATED ORAL at 11:51

## 2022-04-25 RX ADMIN — BUMETANIDE 5 MG: 0.25 INJECTION, SOLUTION INTRAMUSCULAR; INTRAVENOUS at 10:21

## 2022-04-25 RX ADMIN — Medication 50 MCG: at 19:41

## 2022-04-25 RX ADMIN — CARVEDILOL 25 MG: 12.5 TABLET, FILM COATED ORAL at 11:52

## 2022-04-25 ASSESSMENT — ACTIVITIES OF DAILY LIVING (ADL)
ADLS_ACUITY_SCORE: 4
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
## 2022-04-25 NOTE — PLAN OF CARE
Problem: Plan of Care - These are the overarching goals to be used throughout the patient stay.    Goal: Optimal Comfort and Wellbeing  Outcome: Ongoing, Progressing   101.7 fever noted, PRN tylenol given. IV antibiotics given per order. NSR on tele. Continues 5L oxygen. Recheck magnesium and potassium in the am. Call light within reach, calls appropriately. VSS, will continue to monitor.     Goal Outcome Evaluation:

## 2022-04-25 NOTE — H&P
Interventional Radiology - Pre-Procedure Note:  4/25/2022    Procedure Requested: tunneled HD cath  Requested by: Sana Alonzo MD    History and Physical Reviewed: H&P documented within 30 days (by Sana Alonzo MD on 4/24/22).  I have personally reviewed the patient's medical history and have updated the medical record as necessary.    Brief HPI: Alysia Hernandez is a 36 year old female with hx DMII, stage V kidney disease secondary to diabetic nephropathy, HTN admitted 4/23 r/t pulmonary edema in CKD setting with suspected sepsis. Patient in need of hemodialysis initiation today.    Patient with left AVF placed 4/13/22 but not currently ready for use.    Patient with 102.4F fever this AM. Blood cultures drawn 4/23 1855 remain with no growth, however not at 48hr timeframe.        NPO: midnight ordered 4/26  ANTICOAGULANTS: none  ANTIBIOTICS: On cefepime and Vanco; no additional for IR procedure    ALLERGIES  No Known Allergies      LABS:  No results found for: INR   Hemoglobin   Date Value Ref Range Status   04/24/2022 8.5 (L) 11.7 - 15.7 g/dL Final     Platelet Count   Date Value Ref Range Status   04/24/2022 262 150 - 450 10e3/uL Final     Creatinine   Date Value Ref Range Status   04/25/2022 9.57 (HH) 0.60 - 1.10 mg/dL Final   11/08/2013 0.5 (L) 0.6 - 1.3 mg/dL Final     Potassium   Date Value Ref Range Status   04/25/2022 4.5 3.5 - 5.0 mmol/L Final   11/08/2013 4.0 3.4 - 5.3 mmol/L Final         EXAM:  BP (!) 142/65 (BP Location: Right arm)   Pulse 82   Temp 98.2  F (36.8  C) (Oral)   Resp 20   Ht 1.524 m (5')   Wt 91.6 kg (202 lb)   LMP 03/31/2022 (Approximate)   SpO2 99%   BMI 39.45 kg/m    General:  Stable.  In no acute distress.    Neuro:  A&O x 3. Moves all extremities equally.  Resp:  Lungs clear to auscultation bilaterally. On 2L via NC  Cardio:  S1S2 and reg, without murmur, clicks or rubs  Abdomen:  Soft, non-distended.  Skin:  Without excoriations, ecchymosis, erythema, lesions or open  sores on bilateral upper chest and neck.      Pre-Sedation Assessment:  Mallampati Airway Classification:  II - Faucial pillars and soft palate may be seen, but uvula is masked by the base of the tongue  Previous reaction to anesthesia/sedation:  No  Sedation plan based on assessment: Moderate (conscious) sedation  ASA Classification: Class 3 - SEVERE SYSTEMIC DISEASE, DEFINITE FUNCTIONAL LIMITATIONS.   Code Status: Full Code intra procedure, per discussion with patient.       ASSESSMENT/PLAN:   -Patient without negative blood cultures for 48 hours. Fever today. Also reviewed with Dr. Holt.  -Would recommend non-tunneled HD catheter placement if patient in need of dialysis today; if able to wait for placement 4/26 would then recommend tunneled HD cath placement if blood cultures remained negative.  -Discussed with Dr. Leslie (nephrology) - patient okay to wait for 4/26 to initiate dialysis and will plan for tunneled HD cath line 4/26.      Pending blood cultures remain negative 4/26 and IR ADONIS chart review 4/26:  Tentative percutaneous tunneled hemodialysis catheter placement with sedation 4/26/22 in AM.      Procedure, risks/benefits, details, alternatives, and sedation reviewed with patient and patient verbalized understanding. All questions answered. OK to proceed with above radiology procedure.     MARGA Venegas CNP  Interventional Radiology

## 2022-04-25 NOTE — PLAN OF CARE
Pt had a fever of 102.4 at 0430 and was given her PRN tylenol. Her temp at 0640 was 100. She has dyspnea on exertion and has denied pain throughout the night. She is supposed to have a dialysis catheter placed sometime today and has been NPO since 0000. Her creatinine this am was 9.57 up from 9.05 yesterday. House officer was updated. She has been normal sinus rhythm on telemetry. She continues to be on 5 L of O2. She is on the potassium and magnesium protocols and both will be redrawn tomorrow. She is able to make needs known. Call light is within reach.
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
## 2022-04-25 NOTE — PROGRESS NOTES
Renal progress note  CC: CKD 5 now ESRD  Assessment and Plan:  36-year-old female with a past medical history of CKD 5 progressed to ESRD secondary to diabetic nephropathy history of type 2 diabetes hypertension and obesity presented due to extreme fatigue weakness poor appetite and hypervolemia with pulmonary edema on 4/23/2020 admitted with possible suspicion of CAP and sepsis.  She had AVF placed awaiting maturation currently.  Nephrology consulted for progression of CKD 5 and uremia along with hypervolemia    CKD 5 progression to ESRD  Secondary to diabetic nephropathy nephrotic range proteinuria  Creatinine progressed to >9  AVF placed 4/13/2022  -- IR consulted for CVC placement today Will need blood cultures stay negative for 48 hours specially in the setting of fevers procedure held today we will keep n.p.o. overnight and reschedule procedure for tomorrow  -- We will need dialysis set up at [facility] or [facility] to schedule around work  -- Also needs referral to [facility] for transplant eval  -- ECHO 3/2022 with EF about 60% normal systolic function although trace loculated inferior effusion was seen at that study as well??  Pericarditis  -- Hopefully initiate dialysis tomorrow if cultures are positive will need a temp line  -- We will repeat echo    Hypervolemia with flash pulmonary edema  On diuretics bumetanide 5 mg IV twice daily metolazone 5 mg twice daily  Hypertension  On amlodipine 10 mg carvedilol 25 mgx2 Cardura 2 mg x 2 hydralazine 50 mg x3 with as needed clonidine  Keep blood pressure around 140-150/80  -- once started on dialysis will initiate on ARB    Electrolytes stable  Mild hypomagnesemia  Monitor for now    Metabolic acidosis likely secondary to poor renal reserve  We will keep on bicarb supplements until dialysis is initiated increased to 1300 mg 3 times a day  --Monitor on daily labs    Anemia  EPO dosed 4/19/22  Patient likely candidate for transplant avoid  transfusions  -- We will keep on 40 K weekly  We will recheck iron levels    MBD  On Tums and calcitriol 0.25 x 3/week  Monitor Phos x2/week  PTH elevated to 539, vitamin D low at 9  D3 replacement 1000U daily    Pneumonia with ongoing fevers  Dialysis catheter placement was held today with blood cultures negative to date but less than 48 hours currently  We will keep on schedule for dialysis catheter placement tomorrow if blood cultures remain negative  On cefepime and Vanco started 4/23/2022  Monitor Vanco dose with poor clearance    Thank you for the consultation we will follow  Gabriele Leslie MD  [facility]  [phone]      Subjective  She is seen sitting on the side of the bed and n.p.o. for possible dialysis catheter placement today although has remained febrile over the last 24 hours with blood cultures still negative for less than 24 hours  Breathing is stable labs are stable at this point has had good urine output remains mildly acidotic   advised that with high fevers with still need to wait for cultures to be negative for 48 hours in order to place a permanent dialysis catheter  Will need to be n.p.o. overnight again for possible catheter placement tomorrow    Objective    Vital signs in last 24 hours  Temp:  [98.2  F (36.8  C)-102.4  F (39.1  C)] 98.2  F (36.8  C)  Pulse:  [82-95] 82  Resp:  [16-20] 20  BP: (139-175)/(62-80) 142/65  SpO2:  [98 %-100 %] 99 %  Weight:   [unfilled]    Intake/Output last 3 shifts  I/O last 3 completed shifts:  In: 1320 [P.O.:1320]  Out: 1000 [Urine:1000]  Intake/Output this shift:  No intake/output data recorded.    Physical Exam  Alert/awake, NAD  CV: RRR without murmur ??rub  Lung: clear and equal; no extra sounds  Ab: soft and NT; not distended; normal bs  Ext: +edema and well perfused  Skin; no rash  aVF thrill positive +swelling    Pertinent Labs   Lab Results   Component Value Date    WBC 9.2 04/24/2022    HGB 8.5 (L) 04/24/2022    HCT 28.5 (L)  04/24/2022    MCV 82 04/24/2022     04/24/2022     Lab Results   Component Value Date    BUN 73 (H) 04/25/2022     (L) 04/25/2022    CO2 18 (L) 04/25/2022       Lab Results   Component Value Date    ALBUMIN 2.6 (L) 04/24/2022     Lab Results   Component Value Date    PHOS 6.7 (H) 04/24/2022     I reviewed all lab results  Gabriele Leslie MD
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
## 2022-04-25 NOTE — PRE-PROCEDURE
GENERAL PRE-PROCEDURE:   Procedure:  Tunneled HD cath  Date/Time:  4/25/2022 3:22 PM    Written consent obtained?: Yes    Risks and benefits: Risks, benefits and alternatives were discussed    Consent given by:  Patient  Patient states understanding of procedure being performed: Yes    Patient's understanding of procedure matches consent: Yes    Procedure consent matches procedure scheduled: Yes    Expected level of sedation:  Moderate  Appropriately NPO:  Yes  Mallampati  :  Grade 2- soft palate, base of uvula, tonsillar pillars, and portion of posterior pharyngeal wall visible  Lungs:  Lungs clear with good breath sounds bilaterally and other (comment)  Lung exam comment:  On 2L via NC  Heart:  Normal heart sounds and rate  History & Physical reviewed:  History and physical reviewed and no updates needed  Statement of review:  I have reviewed the lab findings, diagnostic data, medications, and the plan for sedation

## 2022-04-26 ENCOUNTER — APPOINTMENT (OUTPATIENT)
Dept: CT IMAGING | Facility: HOSPITAL | Age: 37
End: 2022-04-26
Attending: INTERNAL MEDICINE
Payer: COMMERCIAL

## 2022-04-26 ENCOUNTER — APPOINTMENT (OUTPATIENT)
Dept: ULTRASOUND IMAGING | Facility: HOSPITAL | Age: 37
End: 2022-04-26
Attending: INTERNAL MEDICINE
Payer: COMMERCIAL

## 2022-04-26 ENCOUNTER — APPOINTMENT (OUTPATIENT)
Dept: INTERVENTIONAL RADIOLOGY/VASCULAR | Facility: HOSPITAL | Age: 37
End: 2022-04-26
Attending: NURSE PRACTITIONER
Payer: COMMERCIAL

## 2022-04-26 ENCOUNTER — APPOINTMENT (OUTPATIENT)
Dept: CARDIOLOGY | Facility: HOSPITAL | Age: 37
End: 2022-04-26
Attending: INTERNAL MEDICINE
Payer: COMMERCIAL

## 2022-04-26 LAB
GLUCOSE BLDC GLUCOMTR-MCNC: 114 MG/DL (ref 70–99)
GLUCOSE BLDC GLUCOMTR-MCNC: 86 MG/DL (ref 70–99)
GLUCOSE BLDC GLUCOMTR-MCNC: 99 MG/DL (ref 70–99)
HOLD SPECIMEN: NORMAL
LVEF ECHO: NORMAL
MAGNESIUM SERPL-MCNC: 1.7 MG/DL (ref 1.8–2.6)
POTASSIUM BLD-SCNC: 4.3 MMOL/L (ref 3.5–5)
VANCOMYCIN SERPL-MCNC: 20 MG/L

## 2022-04-26 PROCEDURE — 93306 TTE W/DOPPLER COMPLETE: CPT | Mod: 26 | Performed by: INTERNAL MEDICINE

## 2022-04-26 PROCEDURE — C1769 GUIDE WIRE: HCPCS

## 2022-04-26 PROCEDURE — 5A1D70Z PERFORMANCE OF URINARY FILTRATION, INTERMITTENT, LESS THAN 6 HOURS PER DAY: ICD-10-PCS | Performed by: INTERNAL MEDICINE

## 2022-04-26 PROCEDURE — 84132 ASSAY OF SERUM POTASSIUM: CPT | Performed by: INTERNAL MEDICINE

## 2022-04-26 PROCEDURE — 258N000003 HC RX IP 258 OP 636: Performed by: INTERNAL MEDICINE

## 2022-04-26 PROCEDURE — 99233 SBSQ HOSP IP/OBS HIGH 50: CPT | Mod: 24 | Performed by: INTERNAL MEDICINE

## 2022-04-26 PROCEDURE — 210N000001 HC R&B IMCU HEART CARE

## 2022-04-26 PROCEDURE — 36415 COLL VENOUS BLD VENIPUNCTURE: CPT | Performed by: INTERNAL MEDICINE

## 2022-04-26 PROCEDURE — 93971 EXTREMITY STUDY: CPT | Mod: LT

## 2022-04-26 PROCEDURE — 71250 CT THORAX DX C-: CPT

## 2022-04-26 PROCEDURE — 80202 ASSAY OF VANCOMYCIN: CPT | Performed by: INTERNAL MEDICINE

## 2022-04-26 PROCEDURE — 99233 SBSQ HOSP IP/OBS HIGH 50: CPT | Performed by: INTERNAL MEDICINE

## 2022-04-26 PROCEDURE — 36556 INSERT NON-TUNNEL CV CATH: CPT

## 2022-04-26 PROCEDURE — 90935 HEMODIALYSIS ONE EVALUATION: CPT

## 2022-04-26 PROCEDURE — 250N000011 HC RX IP 250 OP 636: Performed by: INTERNAL MEDICINE

## 2022-04-26 PROCEDURE — 250N000013 HC RX MED GY IP 250 OP 250 PS 637: Performed by: INTERNAL MEDICINE

## 2022-04-26 PROCEDURE — 87040 BLOOD CULTURE FOR BACTERIA: CPT | Performed by: INTERNAL MEDICINE

## 2022-04-26 PROCEDURE — 272N000500 HC NEEDLE CR2

## 2022-04-26 PROCEDURE — 634N000001 HC RX 634: Performed by: INTERNAL MEDICINE

## 2022-04-26 PROCEDURE — 250N000009 HC RX 250: Performed by: RADIOLOGY

## 2022-04-26 PROCEDURE — 250N000011 HC RX IP 250 OP 636: Performed by: RADIOLOGY

## 2022-04-26 PROCEDURE — 250N000013 HC RX MED GY IP 250 OP 250 PS 637: Performed by: STUDENT IN AN ORGANIZED HEALTH CARE EDUCATION/TRAINING PROGRAM

## 2022-04-26 PROCEDURE — C1752 CATH,HEMODIALYSIS,SHORT-TERM: HCPCS

## 2022-04-26 PROCEDURE — 255N000002 HC RX 255 OP 636: Performed by: INTERNAL MEDICINE

## 2022-04-26 PROCEDURE — 83735 ASSAY OF MAGNESIUM: CPT | Performed by: INTERNAL MEDICINE

## 2022-04-26 RX ORDER — ALBUMIN (HUMAN) 12.5 G/50ML
50 SOLUTION INTRAVENOUS
Status: DISCONTINUED | OUTPATIENT
Start: 2022-04-26 | End: 2022-04-30 | Stop reason: HOSPADM

## 2022-04-26 RX ORDER — HEPARIN SODIUM 1000 [USP'U]/ML
2500 INJECTION, SOLUTION INTRAVENOUS; SUBCUTANEOUS ONCE
Status: COMPLETED | OUTPATIENT
Start: 2022-04-26 | End: 2022-04-26

## 2022-04-26 RX ORDER — NALOXONE HYDROCHLORIDE 0.4 MG/ML
0.2 INJECTION, SOLUTION INTRAMUSCULAR; INTRAVENOUS; SUBCUTANEOUS
Status: DISCONTINUED | OUTPATIENT
Start: 2022-04-26 | End: 2022-04-30 | Stop reason: HOSPADM

## 2022-04-26 RX ORDER — FLUMAZENIL 0.1 MG/ML
0.2 INJECTION, SOLUTION INTRAVENOUS
Status: DISCONTINUED | OUTPATIENT
Start: 2022-04-26 | End: 2022-04-30 | Stop reason: HOSPADM

## 2022-04-26 RX ORDER — MEROPENEM 500 MG/1
500 INJECTION, POWDER, FOR SOLUTION INTRAVENOUS EVERY 24 HOURS
Status: DISCONTINUED | OUTPATIENT
Start: 2022-04-26 | End: 2022-04-29

## 2022-04-26 RX ORDER — NALOXONE HYDROCHLORIDE 0.4 MG/ML
0.4 INJECTION, SOLUTION INTRAMUSCULAR; INTRAVENOUS; SUBCUTANEOUS
Status: DISCONTINUED | OUTPATIENT
Start: 2022-04-26 | End: 2022-04-30 | Stop reason: HOSPADM

## 2022-04-26 RX ORDER — ALBUMIN (HUMAN) 12.5 G/50ML
50 SOLUTION INTRAVENOUS
Status: DISCONTINUED | OUTPATIENT
Start: 2022-04-27 | End: 2022-04-30 | Stop reason: HOSPADM

## 2022-04-26 RX ORDER — FENTANYL CITRATE 50 UG/ML
25-50 INJECTION, SOLUTION INTRAMUSCULAR; INTRAVENOUS EVERY 5 MIN PRN
Status: DISCONTINUED | OUTPATIENT
Start: 2022-04-26 | End: 2022-04-30 | Stop reason: HOSPADM

## 2022-04-26 RX ADMIN — HYDRALAZINE HYDROCHLORIDE 50 MG: 50 TABLET, FILM COATED ORAL at 08:40

## 2022-04-26 RX ADMIN — EPOETIN ALFA-EPBX 40000 UNITS: 10000 INJECTION, SOLUTION INTRAVENOUS; SUBCUTANEOUS at 11:52

## 2022-04-26 RX ADMIN — CALCIUM CARBONATE (ANTACID) CHEW TAB 500 MG 500 MG: 500 CHEW TAB at 07:17

## 2022-04-26 RX ADMIN — BUMETANIDE 5 MG: 0.25 INJECTION, SOLUTION INTRAMUSCULAR; INTRAVENOUS at 20:50

## 2022-04-26 RX ADMIN — CARVEDILOL 25 MG: 12.5 TABLET, FILM COATED ORAL at 08:39

## 2022-04-26 RX ADMIN — MEROPENEM 500 MG: 500 INJECTION, POWDER, FOR SOLUTION INTRAVENOUS at 17:20

## 2022-04-26 RX ADMIN — SODIUM BICARBONATE 1300 MG: 648 TABLET ORAL at 20:50

## 2022-04-26 RX ADMIN — DOXAZOSIN 2 MG: 1 TABLET ORAL at 08:40

## 2022-04-26 RX ADMIN — HEPARIN SODIUM 3000 UNITS: 1000 INJECTION, SOLUTION INTRAVENOUS; SUBCUTANEOUS at 15:07

## 2022-04-26 RX ADMIN — CALCIUM CARBONATE (ANTACID) CHEW TAB 500 MG 500 MG: 500 CHEW TAB at 17:20

## 2022-04-26 RX ADMIN — HYDRALAZINE HYDROCHLORIDE 50 MG: 50 TABLET, FILM COATED ORAL at 20:51

## 2022-04-26 RX ADMIN — SODIUM BICARBONATE 1300 MG: 648 TABLET ORAL at 08:40

## 2022-04-26 RX ADMIN — DOXAZOSIN 2 MG: 1 TABLET ORAL at 20:51

## 2022-04-26 RX ADMIN — SODIUM CHLORIDE 250 ML: 9 INJECTION, SOLUTION INTRAVENOUS at 15:08

## 2022-04-26 RX ADMIN — LIDOCAINE HYDROCHLORIDE 10 ML: 10 INJECTION, SOLUTION INFILTRATION; PERINEURAL at 10:02

## 2022-04-26 RX ADMIN — ACETAMINOPHEN 650 MG: 325 TABLET ORAL at 17:07

## 2022-04-26 RX ADMIN — SODIUM BICARBONATE 1300 MG: 648 TABLET ORAL at 17:20

## 2022-04-26 RX ADMIN — CARVEDILOL 25 MG: 12.5 TABLET, FILM COATED ORAL at 17:20

## 2022-04-26 RX ADMIN — Medication 50 MCG: at 08:40

## 2022-04-26 RX ADMIN — CALCIUM CARBONATE (ANTACID) CHEW TAB 500 MG 500 MG: 500 CHEW TAB at 11:52

## 2022-04-26 RX ADMIN — SODIUM CHLORIDE 250 ML: 9 INJECTION, SOLUTION INTRAVENOUS at 15:09

## 2022-04-26 RX ADMIN — SODIUM CHLORIDE 300 ML: 9 INJECTION, SOLUTION INTRAVENOUS at 14:53

## 2022-04-26 RX ADMIN — HEPARIN SODIUM 2500 UNITS: 1000 INJECTION INTRAVENOUS; SUBCUTANEOUS at 10:04

## 2022-04-26 RX ADMIN — PERFLUTREN 2 ML: 6.52 INJECTION, SUSPENSION INTRAVENOUS at 13:27

## 2022-04-26 RX ADMIN — SODIUM CHLORIDE 300 ML: 9 INJECTION, SOLUTION INTRAVENOUS at 14:52

## 2022-04-26 RX ADMIN — BUMETANIDE 5 MG: 0.25 INJECTION, SOLUTION INTRAMUSCULAR; INTRAVENOUS at 08:40

## 2022-04-26 RX ADMIN — ACETAMINOPHEN 650 MG: 325 TABLET ORAL at 23:51

## 2022-04-26 RX ADMIN — AMLODIPINE BESYLATE 10 MG: 5 TABLET ORAL at 08:39

## 2022-04-26 ASSESSMENT — ACTIVITIES OF DAILY LIVING (ADL)
ADLS_ACUITY_SCORE: 4
ADLS_ACUITY_SCORE: 6
ADLS_ACUITY_SCORE: 4
ADLS_ACUITY_SCORE: 6
ADLS_ACUITY_SCORE: 6
ADLS_ACUITY_SCORE: 4

## 2022-04-26 NOTE — PROGRESS NOTES
Outpatient dialysis placement started via online [facility] portal     2:43 PM  Per [facility]:     Chair Held M/W/F 4:30 PM at [facility]      2:44 PM  Attempted to discuss with patient x2 today, unavailable. Need to verify patients preferred schedule for dialysis.
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
## 2022-04-26 NOTE — PROGRESS NOTES
Seen on dialysis tolerating well  Crit profile B  Set for UF of 2200   Next treatment tomorrow   prescription reviewed with RN  Gabriele Leslie MD  [facility]  [phone]
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
## 2022-04-26 NOTE — PLAN OF CARE
Problem: Plan of Care - These are the overarching goals to be used throughout the patient stay.    Goal: Plan of Care Review/Shift Note  Description: The Plan of Care Review/Shift note should be completed every shift.  The Outcome Evaluation is a brief statement about your assessment that the patient is improving, declining, or no change.  This information will be displayed automatically on your shift note.  Outcome: Ongoing, Progressing  Patient had a chest CT, ECHO, US of upper left extremity, and non-tunneled dialysis catheter placed today. Vitals have been stable, sob with activity but saturation stable on room air,crackles in lung bases. Patient feels better with oxygen on, currently on 2L. Patient receiving first dialysis treatment, appears to be tolerating well.     Problem: Plan of Care - These are the overarching goals to be used throughout the patient stay.    Goal: Optimal Comfort and Wellbeing  Outcome: Ongoing, Progressing   No complaints of pain or discomfort. Denies nausea, tolerating diet.

## 2022-04-26 NOTE — PROGRESS NOTES
Renal progress note  CC: CKD 5 now ESRD  Assessment and Plan:  36-year-old female with a past medical history of CKD 5 progressed to ESRD secondary to diabetic nephropathy history of type 2 diabetes hypertension and obesity presented due to extreme fatigue weakness poor appetite and hypervolemia with pulmonary edema on 4/23/2020 admitted with possible suspicion of CAP and sepsis.  She had AVF placed awaiting maturation currently.  Nephrology consulted for progression of CKD 5 and uremia along with hypervolemia    CKD 5 progression to ESRD  Secondary to diabetic nephropathy nephrotic range proteinuria  Creatinine progressed to >9  AVF placed 4/13/2022  -- IR consulted for CVC placement   Will get nontunneled line today dialyze today and tomorrow and follow on fever curves and ID recs before placing tunneled line likely Friday  -- We will need dialysis set up at [facility] or [address] [place] [address] to schedule around work  -- Also needs referral to [facility] for transplant eval  -- ECHO 3/2022 with EF about 60% normal systolic function although trace loculated inferior effusion was seen at that study as well??  Pericarditis--> repeat pending  -- will see on HD later today    Hypervolemia with flash pulmonary edema  On diuretics bumetanide 5 mg IV twice daily metolazone 5 mg twice daily  Hypertension  On amlodipine 10 mg carvedilol 25 mgx2 Cardura 2 mg x 2 hydralazine 50 mg x3 with as needed clonidine  Keep blood pressure around 140-150/80  -- once started on dialysis will initiate on ARB    Electrolytes stable  Mild hypomagnesemia  Monitor for now    Metabolic acidosis likely secondary to poor renal reserve  We will keep on bicarb supplements until dialysis is initiated increased to 1300 mg 3 times a day  --Monitor on daily labs    Anemia  EPO dosed 4/19/22  Patient likely candidate for transplant avoid transfusions  -- We will keep on 40 K weekly  We will recheck iron levels    MBD  On Tums and  calcitriol 0.25 x 3/week  Monitor Phos x2/week  PTH elevated to 539, vitamin D low at 9  D3 replacement 1000U daily    Pneumonia with ongoing fevers  Dialysis catheter placement was held today with blood cultures negative to date but less than 48 hours currently  We will keep on schedule for dialysis catheter placement tomorrow if blood cultures remain negative  On cefepime and Vanco started 4/23/2022  Monitor Vanco dose with poor clearance    Thank you for the consultation we will follow  Gabriele Leslie MD  [facility]  [phone]      Subjective  She is seen sitting on the side of the bed and n.p.o. for dialysis catheter placement today although has remained febrile over the last 24 hours with blood cultures still negative   Breathing is stable labs are stable at this point has had good urine output remains mildly acidotic   advised that with high fevers with still need to wait to place a permanent dialysis catheter    Objective    Vital signs in last 24 hours  Temp:  [97.9  F (36.6  C)-101.9  F (38.8  C)] 99  F (37.2  C)  Pulse:  [82-97] 85  Resp:  [16-20] 16  BP: (131-157)/(64-74) 131/64  SpO2:  [98 %-100 %] 100 %  Weight:   [unfilled]    Intake/Output last 3 shifts  I/O last 3 completed shifts:  In: 1460 [P.O.:1360; I.V.:100]  Out: 800 [Urine:800]  Intake/Output this shift:  No intake/output data recorded.    Physical Exam  Alert/awake, NAD  CV: RRR without murmur ??rub  Lung: clear and equal; no extra sounds  Ab: soft and NT; not distended; normal bs  Ext: +edema and well perfused  Skin; no rash  aVF thrill positive +swelling    Pertinent Labs   Lab Results   Component Value Date    WBC 9.2 04/24/2022    HGB 8.5 (L) 04/24/2022    HCT 28.5 (L) 04/24/2022    MCV 82 04/24/2022     04/24/2022     Lab Results   Component Value Date    BUN 73 (H) 04/25/2022     (L) 04/25/2022    CO2 18 (L) 04/25/2022       Lab Results   Component Value Date    ALBUMIN 2.6 (L) 04/24/2022     Lab  Results   Component Value Date    PHOS 6.7 (H) 04/24/2022     I reviewed all lab results  Gabriele Leslie MD

## 2022-04-26 NOTE — IR NOTE
Telephone report given to Pennie GOMEZ, no questions at the end of report. PT currently in CT, site re checked in CT and was good, clean and dry.

## 2022-04-26 NOTE — PROCEDURES
HEMODIALYSIS NOTE:      ASSESSMENT: A/O x3    TREATMENT TIME:2.5     RINSE: Clear    UF TOTAL(net) : 2000    BVP: 36.8 L      WEIGHT PRE: 90 Kg (198 lb 6.6 oz)    WEIGHT POST: 88 Kg (194 lb 0.1 oz)    ACCESS PRE/POST: RIJ/Non-Tunneled catheter with clean, dry and intact dressing. Both ports aspirated and flushed easily. Heparin instilled to fill volumes for dwell. Clamped, capped and secured. Art port 1.1 ml, Venous port 1.1 ml, Dressing intact, and maintained .        HEPATITIS STATUS:  Hbsag: Non-reactive 4/23/22 MHF  HBSab: Immune 4/25/22      RUN SUMMARY: Uneventful HD, patient tolerated first HD treatment. Pt. was hemodynamically stable during dialysis. Tolerated fluid removal of 2000 mL. Goal adjustment per critline and hemodynamics. K3-bath. Seen by  Dr. Leslie on dialysis. Report given to Pennie Carmona RN.       INTERVENTIONS:  Monitor VS Q 15 minutes and PRN  Critline used for fluid monitoring/management.      PLAN: HD tomorrow

## 2022-04-26 NOTE — PROGRESS NOTES
Interventional Radiology - Pre-Procedure Note:  4/26/2022    Procedure Requested: Non-tunneled dialysis catheter placement  Requested by: Dr. Anuj Macario HPI: Alysia Hernandez is a 37 year old female with hx DMII, stage V kidney disease secondary to diabetic nephropathy, HTN admitted 4/23 r/t pulmonary edema in CKD setting with suspected sepsis. Patient in need of hemodialysis initiation today.     Patient with left AVF placed 4/13/22 but not currently ready for use.     Patient with 101.9F fever overnight, blood cultures pending.      NPO: NA; no sedation  ANTICOAGULANTS: none  ANTIBIOTICS: On cefepime and Vanco; no additional for IR procedure    ALLERGIES  No Known Allergies      LABS:  No results found for: INR   Hemoglobin   Date Value Ref Range Status   04/24/2022 8.5 (L) 11.7 - 15.7 g/dL Final   ]  Platelet Count   Date Value Ref Range Status   04/24/2022 262 150 - 450 10e3/uL Final     Creatinine   Date Value Ref Range Status   04/25/2022 9.57 (HH) 0.60 - 1.10 mg/dL Final   11/08/2013 0.5 (L) 0.6 - 1.3 mg/dL Final     Potassium   Date Value Ref Range Status   04/26/2022 4.3 3.5 - 5.0 mmol/L Final   11/08/2013 4.0 3.4 - 5.3 mmol/L Final         EXAM:  /64 (BP Location: Right arm)   Pulse 85   Temp 99  F (37.2  C) (Oral)   Resp 16   Ht 1.524 m (5')   Wt 91.6 kg (202 lb)   LMP 03/31/2022 (Approximate)   SpO2 100%   BMI 39.45 kg/m    General:  Stable.  In no acute distress.    Neuro:  A&O x 3. Moves all extremities equally.  Resp:  On RA, non-labored breathing.   Skin:  Without excoriations, ecchymosis, erythema, lesions or open sores on neck.      ASSESSMENT/PLAN:   Patient remains febrile overnight (temp 101.9F) and blood cultures pending. Discussed case with nephrology; changed dialysis access request to non-tunneled dialysis catheter placement today.     Non-tunneled dialysis catheter placement.     Procedure, risks/benefits, details, alternatives, reviewed with patient and verbalized  understanding. All questions answered. OK to proceed with above radiology procedure.     Cadence Flaherty, APRN CNP  Interventional Radiology

## 2022-04-26 NOTE — PHARMACY-VANCOMYCIN DOSING SERVICE
Pharmacy Vancomycin Note  Date of Service 2022  Patient's  1985   37 year old, female    Indication: Sepsis  Day of Therapy: 3  Current vancomycin regimen:  Intermittent dosing  Current vancomycin monitoring method: Trough (Method 2 = manual dose calculation)  Current vancomycin therapeutic monitoring goal: 15-20 mg/L      Current estimated CrCl = Estimated Creatinine Clearance: 8.1 mL/min (A) (based on SCr of 9.57 mg/dL (HH)).    Creatinine for last 3 days  2022:  4:57 AM Creatinine 9.05 mg/dL  2022:  4:41 AM Creatinine 9.57 mg/dL    Recent Vancomycin Levels (past 3 days)  2022:  4:41 AM Vancomycin 31.4 mg/L  2022:  5:50 PM Vancomycin 20.0 mg/L    Vancomycin IV Administrations (past 72 hours)                   vancomycin (VANCOCIN) 2,000 mg in sodium chloride 0.9 % 500 mL intermittent infusion (mg) 2,000 mg New Bag 22                Nephrotoxins and other renal medications (From now, onward)    Start     Dose/Rate Route Frequency Ordered Stop    22 1000  bumetanide (BUMEX) injection 5 mg         5 mg Intravenous 2 TIMES DAILY (Diuretics and Nitrates) 22 0932      22 2259  vancomycin place bahena - receiving intermittent dosing         1 each Intravenous SEE ADMIN INSTRUCTIONS 22 2300               Contrast Orders - past 72 hours (72h ago, onward)    Start     Dose/Rate Route Frequency Stop    22 1330  perflutren lipid microsphere (DEFINITY) injection SUSP 2 mL         2 mL Intravenous ONCE 22 1327    22 2130  technetium pertechnetate with albumin (Tc99m MAA) radioisotope injection 6-7 millicurie         6-7 millicurie Intravenous ONCE 22 2129          Interpretation of levels and current regimen:  Vancomycin level is reflective of therapeutic level    Renal Function: ESRD on Dialysis    Plan:  1. No dose indicated at this time.  Continue intermittent dosing based on levels.  2. Vancomycin monitoring method: Trough  (Method 2 = manual dose calculation)  3. Vancomycin therapeutic monitoring goal: 15-20 mg/L  4. Pharmacy will check vancomycin levels as appropriate.    Cy Giraldo RPH
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
## 2022-04-27 LAB
ATRIAL RATE - MUSE: 110 BPM
DIASTOLIC BLOOD PRESSURE - MUSE: NORMAL MMHG
GLUCOSE BLDC GLUCOMTR-MCNC: 115 MG/DL (ref 70–99)
GLUCOSE BLDC GLUCOMTR-MCNC: 85 MG/DL (ref 70–99)
INTERPRETATION ECG - MUSE: NORMAL
MAGNESIUM SERPL-MCNC: 1.7 MG/DL (ref 1.8–2.6)
P AXIS - MUSE: 65 DEGREES
POTASSIUM BLD-SCNC: 4.1 MMOL/L (ref 3.5–5)
PR INTERVAL - MUSE: 142 MS
QRS DURATION - MUSE: 78 MS
QT - MUSE: 334 MS
QTC - MUSE: 452 MS
R AXIS - MUSE: 70 DEGREES
SYSTOLIC BLOOD PRESSURE - MUSE: NORMAL MMHG
T AXIS - MUSE: 69 DEGREES
VENTRICULAR RATE- MUSE: 110 BPM

## 2022-04-27 PROCEDURE — 99233 SBSQ HOSP IP/OBS HIGH 50: CPT | Mod: 24 | Performed by: INTERNAL MEDICINE

## 2022-04-27 PROCEDURE — 250N000013 HC RX MED GY IP 250 OP 250 PS 637: Performed by: INTERNAL MEDICINE

## 2022-04-27 PROCEDURE — 258N000003 HC RX IP 258 OP 636: Performed by: INTERNAL MEDICINE

## 2022-04-27 PROCEDURE — 99233 SBSQ HOSP IP/OBS HIGH 50: CPT | Performed by: INTERNAL MEDICINE

## 2022-04-27 PROCEDURE — 250N000013 HC RX MED GY IP 250 OP 250 PS 637: Performed by: STUDENT IN AN ORGANIZED HEALTH CARE EDUCATION/TRAINING PROGRAM

## 2022-04-27 PROCEDURE — 250N000011 HC RX IP 250 OP 636: Performed by: INTERNAL MEDICINE

## 2022-04-27 PROCEDURE — 36415 COLL VENOUS BLD VENIPUNCTURE: CPT | Performed by: INTERNAL MEDICINE

## 2022-04-27 PROCEDURE — 90935 HEMODIALYSIS ONE EVALUATION: CPT

## 2022-04-27 PROCEDURE — 84132 ASSAY OF SERUM POTASSIUM: CPT | Performed by: INTERNAL MEDICINE

## 2022-04-27 PROCEDURE — 99232 SBSQ HOSP IP/OBS MODERATE 35: CPT | Performed by: FAMILY MEDICINE

## 2022-04-27 PROCEDURE — 210N000001 HC R&B IMCU HEART CARE

## 2022-04-27 PROCEDURE — 83735 ASSAY OF MAGNESIUM: CPT | Performed by: INTERNAL MEDICINE

## 2022-04-27 RX ADMIN — DOXAZOSIN 2 MG: 1 TABLET ORAL at 22:41

## 2022-04-27 RX ADMIN — MEROPENEM 500 MG: 500 INJECTION, POWDER, FOR SOLUTION INTRAVENOUS at 18:06

## 2022-04-27 RX ADMIN — SODIUM BICARBONATE 1300 MG: 648 TABLET ORAL at 22:43

## 2022-04-27 RX ADMIN — Medication: at 20:57

## 2022-04-27 RX ADMIN — SODIUM CHLORIDE 300 ML: 9 INJECTION, SOLUTION INTRAVENOUS at 20:54

## 2022-04-27 RX ADMIN — CALCIUM CARBONATE (ANTACID) CHEW TAB 500 MG 500 MG: 500 CHEW TAB at 10:44

## 2022-04-27 RX ADMIN — CALCITRIOL 0.25 MCG: 0.25 CAPSULE ORAL at 18:31

## 2022-04-27 RX ADMIN — SODIUM BICARBONATE 1300 MG: 648 TABLET ORAL at 13:29

## 2022-04-27 RX ADMIN — CALCIUM CARBONATE (ANTACID) CHEW TAB 500 MG 500 MG: 500 CHEW TAB at 18:02

## 2022-04-27 RX ADMIN — HEPARIN SODIUM 1400 UNITS: 1000 INJECTION, SOLUTION INTRAVENOUS; SUBCUTANEOUS at 23:18

## 2022-04-27 RX ADMIN — BUMETANIDE 5 MG: 0.25 INJECTION, SOLUTION INTRAMUSCULAR; INTRAVENOUS at 18:05

## 2022-04-27 RX ADMIN — HEPARIN SODIUM 1100 UNITS: 1000 INJECTION, SOLUTION INTRAVENOUS; SUBCUTANEOUS at 23:17

## 2022-04-27 RX ADMIN — CARVEDILOL 25 MG: 12.5 TABLET, FILM COATED ORAL at 18:03

## 2022-04-27 RX ADMIN — Medication 50 MCG: at 18:31

## 2022-04-27 RX ADMIN — BUMETANIDE 5 MG: 0.25 INJECTION, SOLUTION INTRAMUSCULAR; INTRAVENOUS at 10:44

## 2022-04-27 RX ADMIN — SILVER SULFADIAZINE: 10 CREAM TOPICAL at 10:45

## 2022-04-27 RX ADMIN — SODIUM BICARBONATE 1300 MG: 648 TABLET ORAL at 10:44

## 2022-04-27 RX ADMIN — HYDRALAZINE HYDROCHLORIDE 50 MG: 50 TABLET, FILM COATED ORAL at 22:40

## 2022-04-27 ASSESSMENT — ACTIVITIES OF DAILY LIVING (ADL)
ADLS_ACUITY_SCORE: 4
ADLS_ACUITY_SCORE: 6
ADLS_ACUITY_SCORE: 4
ADLS_ACUITY_SCORE: 4
ADLS_ACUITY_SCORE: 6
ADLS_ACUITY_SCORE: 4
ADLS_ACUITY_SCORE: 6
ADLS_ACUITY_SCORE: 6
ADLS_ACUITY_SCORE: 4
ADLS_ACUITY_SCORE: 4
ADLS_ACUITY_SCORE: 6
ADLS_ACUITY_SCORE: 4
ADLS_ACUITY_SCORE: 6
ADLS_ACUITY_SCORE: 4
ADLS_ACUITY_SCORE: 6
ADLS_ACUITY_SCORE: 4
ADLS_ACUITY_SCORE: 6

## 2022-04-27 NOTE — PROGRESS NOTES
Patient slept until 1045. Staff attempted to wake patient several times to take morning medications but she refused. All BP medications were charted as not given patient sleeping because dialysis is coming at 1300 and cannot give these medications 4 hours prior to dialysis. Vit D meds held until after dialysis.     Dressing to left hand done after patient woke.
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
## 2022-04-27 NOTE — PLAN OF CARE
"Goal Outcome Evaluation:      VSS, A&Ox4. Pt slept through/refused morning meds after complaining of \"getting no sleep\" during the night. Pt currently on 2L oxygen via nasal cannula. Pt was to receive dialysis at 1300. Dialysis nurse currently to be arriving by 1730/1800.                 "

## 2022-04-27 NOTE — PLAN OF CARE
Problem: Hyperglycemia  Goal: Blood Glucose Level Within Targeted Range  Outcome: Ongoing, Progressing   Patient was sleeping at breakfast and declined to order tray. No BS taken and no insulin given. Lunch BS was 85, no insulin required.     Problem: Renal Function Impairment (Chronic Kidney Disease)  Goal: Minimize Renal Failure Effects  Outcome: Ongoing, Progressing   Patient to receive Dialysis today. [facility] RN states that this patient will be second run @ 1300. HTN medications held for this reason. Vitamin D held since vitamins dialyze off.   Problem: Adjustment to Illness (Sepsis/Septic Shock)  Goal: Optimal Coping  Outcome: Ongoing, Progressing   Goal Outcome Evaluation:     Patient afebrile throughout the shift, denies symptoms of infection. Dressing to left hand done and wound bed dry with a 2-3 mm area of oozing scant serous fluid. ID MD in room at time and notified.

## 2022-04-27 NOTE — PROGRESS NOTES
Renal progress note  CC: CKD 5 now ESRD  Assessment and Plan:  36-year-old female with a past medical history of CKD 5 progressed to ESRD secondary to diabetic nephropathy history of type 2 diabetes hypertension and obesity presented due to extreme fatigue weakness poor appetite and hypervolemia with pulmonary edema on 4/23/2020 admitted with possible suspicion of CAP and sepsis.  She had AVF placed awaiting maturation currently.  Nephrology consulted for progression of CKD 5 and uremia along with hypervolemia    CKD 5 progression to ESRD  Secondary to diabetic nephropathy nephrotic range proteinuria  Creatinine progressed to >9  AVF placed 4/13/2022  -- Nontunneled CVC placed 4/26/2022  First dialysis treatment 4/26/2020  -- Will be going to [facility] dialysis on Monday Wednesday Friday 4:30 PM  -- Also needs referral to [facility] for transplant eval  -- ECHO stable  -- will plan for tunneled line placement tomorrow, plan for discharge after dialysis tomorrow we will call in orders    Hypervolemia with flash pulmonary edema  On diuretics bumetanide 5 mg IV twice daily metolazone 5 mg twice daily  Hypertension  On amlodipine 10 mg carvedilol 25 mgx2 Cardura 2 mg x 2 hydralazine 50 mg x3 with as needed clonidine  Keep blood pressure around 140-150/80  -- once started on regular dialysis will initiate on ARB    Electrolytes stable  Mild hypomagnesemia  Monitor for now    Metabolic acidosis likely secondary to poor renal reserve  We will keep on bicarb supplements until dialysis is initiated increased to 1300 mg 3 times a day  --Monitor on daily labs    Anemia  EPO dosed 4/19/22  Patient likely candidate for transplant avoid transfusions  -- We will keep on 40 K weekly  We will recheck iron levels    MBD  On Tums and calcitriol 0.25 x 3/week  Monitor Phos x2/week  PTH elevated to 539, vitamin D low at 9  D3 replacement 1000U daily    Pneumonia with ongoing fevers  Dialysis catheter placement was held today with  blood cultures negative to date but less than 48 hours currently  We will keep on schedule for dialysis catheter placement tomorrow if blood cultures remain negative  On cefepime and Vanco started 4/23/2022  Monitor Vanco dose with poor clearance    Thank you for the consultation we will follow  Gabriele Leslie MD  [facility]  [phone]      Subjective  She is seen sitting on the side of the bed   Breathing is better fever breaking up, only 1 episode in last 24 hours  Tolerated dialysis well  Does not like the nontunneled catheter  Discussed need for dialysis today and will place a tunneled line and plan for dialysis tomorrow before discharge    Objective    Vital signs in last 24 hours  Temp:  [97.7  F (36.5  C)-100.5  F (38.1  C)] 97.7  F (36.5  C)  Pulse:  [74-90] 86  Resp:  [14-18] 18  BP: (133-155)/(58-70) 138/59  SpO2:  [97 %-100 %] 97 %  Weight:   [unfilled]    Intake/Output last 3 shifts  I/O last 3 completed shifts:  In: 580 [P.O.:580]  Out: 2800 [Urine:800; Other:2000]  Intake/Output this shift:  I/O this shift:  In: 600 [P.O.:600]  Out: -     Physical Exam  Alert/awake, NAD  CV: RRR without murmur ??rub  Lung: clear and equal; no extra sounds  Ab: soft and NT; not distended; normal bs  Ext: +edema and well perfused  Skin; no rash  aVF thrill positive +swelling    Pertinent Labs   Lab Results   Component Value Date    WBC 9.2 04/24/2022    HGB 8.5 (L) 04/24/2022    HCT 28.5 (L) 04/24/2022    MCV 82 04/24/2022     04/24/2022     Lab Results   Component Value Date    BUN 73 (H) 04/25/2022     (L) 04/25/2022    CO2 18 (L) 04/25/2022       Lab Results   Component Value Date    ALBUMIN 2.6 (L) 04/24/2022     Lab Results   Component Value Date    PHOS 6.7 (H) 04/24/2022     I reviewed all lab results  Gabriele Leslie MD

## 2022-04-27 NOTE — PROGRESS NOTES
Care Management Follow Up    Length of Stay (days): 4    Expected Discharge Date: 04/29/2022     Concerns to be Addressed:       Patient plan of care discussed at interdisciplinary rounds: Yes    Anticipated Discharge Disposition: Home, Dialysis Services     Anticipated Discharge Services:    Anticipated Discharge DME:      Patient/family educated on Medicare website which has current facility and service quality ratings:    Education Provided on the Discharge Plan:    Patient/Family in Agreement with the Plan:      Referrals Placed by CM/SW:    Private pay costs discussed: Not applicable    Additional Information:  See below       Mercedes Pan RN          Met with patient. She said the time for dialysis works well for her    M/W/F 4:30 PM at [facility]    Will print welcome letter for patient when closer to discharge. Patient was confirmed to be accepted via [facility]

## 2022-04-27 NOTE — PLAN OF CARE
Problem: Adjustment to Illness (Chronic Kidney Disease)  Goal: Optimal Coping with Chronic Illness  Outcome: Ongoing, Not Progressing     Problem: Electrolyte Imbalance (Chronic Kidney Disease)  Goal: Electrolyte Balance  Outcome: Ongoing, Not Progressing     Problem: Fluid Volume Excess (Chronic Kidney Disease)  Goal: Fluid Balance  Outcome: Ongoing, Not Progressing     Problem: Hematologic Alteration (Chronic Kidney Disease)  Goal: Absence of Anemia Signs and Symptoms  Outcome: Ongoing, Not Progressing     Problem: Renal Function Impairment (Chronic Kidney Disease)  Goal: Minimize Renal Failure Effects  Outcome: Ongoing, Not Progressing  Intervention: Monitor and Support Renal Function  Recent Flowsheet Documentation  Taken 4/27/2022 0400 by Dwain Mckeon, RN  Medication Review/Management: medications reviewed  Taken 4/27/2022 0000 by Dwain Mckeon RN  Medication Review/Management: medications reviewed  Taken 4/26/2022 2000 by Dwain Mckeon, RN  Medication Review/Management: medications reviewed     Problem: Adjustment to Illness (Sepsis/Septic Shock)  Goal: Optimal Coping  Outcome: Ongoing, Not Progressing     Problem: Glycemic Control Impaired (Sepsis/Septic Shock)  Goal: Blood Glucose Level Within Desired Range  Outcome: Ongoing, Not Progressing     Problem: Infection Progression (Sepsis/Septic Shock)  Goal: Absence of Infection Signs and Symptoms  Outcome: Ongoing, Not Progressing  Intervention: Promote Recovery  Recent Flowsheet Documentation  Taken 4/27/2022 0400 by Dwain Mckeon, RN  Activity Management:   activity adjusted per tolerance   activity encouraged   ambulated in room   ambulated in brothers   up ad abdelrahman   up in chair  Taken 4/27/2022 0000 by Dwain Mckeon, RN  Activity Management:   activity adjusted per tolerance   activity encouraged   ambulated in room   ambulated in brothers   up ad abdelrahman   up in chair  Taken 4/26/2022 2000 by Dwain Mckeon  RN  Activity Management:   activity adjusted per tolerance   activity encouraged   ambulated in room   ambulated in brothers   up ad abdelrahman   up in chair   Goal Outcome Evaluation:      VSS.  However, trending intermittent fevers.  Treated with PRN PO Tylenol, successful.  Denies pain/discomfort/SOB/DILA.  Wound site covered, appearing CDI.  Pending daily HD run.  However, continues to produce urine (see I/O).
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
## 2022-04-28 ENCOUNTER — APPOINTMENT (OUTPATIENT)
Dept: INTERVENTIONAL RADIOLOGY/VASCULAR | Facility: HOSPITAL | Age: 37
End: 2022-04-28
Attending: INTERNAL MEDICINE
Payer: COMMERCIAL

## 2022-04-28 LAB
BASOPHILS # BLD AUTO: 0 10E3/UL (ref 0–0.2)
BASOPHILS NFR BLD AUTO: 1 %
C REACTIVE PROTEIN LHE: 0.9 MG/DL (ref 0–0.8)
EOSINOPHIL # BLD AUTO: 0.3 10E3/UL (ref 0–0.7)
EOSINOPHIL NFR BLD AUTO: 7 %
ERYTHROCYTE [DISTWIDTH] IN BLOOD BY AUTOMATED COUNT: 15.6 % (ref 10–15)
GLUCOSE BLDC GLUCOMTR-MCNC: 133 MG/DL (ref 70–99)
GLUCOSE BLDC GLUCOMTR-MCNC: 147 MG/DL (ref 70–99)
HCT VFR BLD AUTO: 25.4 % (ref 35–47)
HGB BLD-MCNC: 7.6 G/DL (ref 11.7–15.7)
IMM GRANULOCYTES # BLD: 0.1 10E3/UL
IMM GRANULOCYTES NFR BLD: 4 %
LYMPHOCYTES # BLD AUTO: 0.5 10E3/UL (ref 0.8–5.3)
LYMPHOCYTES NFR BLD AUTO: 13 %
MAGNESIUM SERPL-MCNC: 1.6 MG/DL (ref 1.8–2.6)
MCH RBC QN AUTO: 24.8 PG (ref 26.5–33)
MCHC RBC AUTO-ENTMCNC: 29.9 G/DL (ref 31.5–36.5)
MCV RBC AUTO: 83 FL (ref 78–100)
MONOCYTES # BLD AUTO: 0.7 10E3/UL (ref 0–1.3)
MONOCYTES NFR BLD AUTO: 17 %
NEUTROPHILS # BLD AUTO: 2.3 10E3/UL (ref 1.6–8.3)
NEUTROPHILS NFR BLD AUTO: 58 %
NRBC # BLD AUTO: 0 10E3/UL
NRBC BLD AUTO-RTO: 0 /100
PLATELET # BLD AUTO: 150 10E3/UL (ref 150–450)
POTASSIUM BLD-SCNC: 3.8 MMOL/L (ref 3.5–5)
PROCALCITONIN SERPL-MCNC: 1.03 NG/ML (ref 0–0.49)
RBC # BLD AUTO: 3.07 10E6/UL (ref 3.8–5.2)
VANCOMYCIN SERPL-MCNC: 12.8 MG/L
WBC # BLD AUTO: 3.7 10E3/UL (ref 4–11)

## 2022-04-28 PROCEDURE — 90935 HEMODIALYSIS ONE EVALUATION: CPT

## 2022-04-28 PROCEDURE — 250N000011 HC RX IP 250 OP 636: Performed by: INTERNAL MEDICINE

## 2022-04-28 PROCEDURE — 84132 ASSAY OF SERUM POTASSIUM: CPT | Performed by: FAMILY MEDICINE

## 2022-04-28 PROCEDURE — 250N000011 HC RX IP 250 OP 636: Performed by: STUDENT IN AN ORGANIZED HEALTH CARE EDUCATION/TRAINING PROGRAM

## 2022-04-28 PROCEDURE — 84145 PROCALCITONIN (PCT): CPT | Performed by: INTERNAL MEDICINE

## 2022-04-28 PROCEDURE — 99233 SBSQ HOSP IP/OBS HIGH 50: CPT | Mod: 24 | Performed by: INTERNAL MEDICINE

## 2022-04-28 PROCEDURE — 250N000013 HC RX MED GY IP 250 OP 250 PS 637: Performed by: INTERNAL MEDICINE

## 2022-04-28 PROCEDURE — 83735 ASSAY OF MAGNESIUM: CPT | Performed by: FAMILY MEDICINE

## 2022-04-28 PROCEDURE — 210N000001 HC R&B IMCU HEART CARE

## 2022-04-28 PROCEDURE — 86140 C-REACTIVE PROTEIN: CPT | Performed by: HOSPITALIST

## 2022-04-28 PROCEDURE — 99232 SBSQ HOSP IP/OBS MODERATE 35: CPT | Performed by: INTERNAL MEDICINE

## 2022-04-28 PROCEDURE — 36415 COLL VENOUS BLD VENIPUNCTURE: CPT | Performed by: HOSPITALIST

## 2022-04-28 PROCEDURE — 258N000003 HC RX IP 258 OP 636: Performed by: INTERNAL MEDICINE

## 2022-04-28 PROCEDURE — 85025 COMPLETE CBC W/AUTO DIFF WBC: CPT | Performed by: HOSPITALIST

## 2022-04-28 PROCEDURE — 80202 ASSAY OF VANCOMYCIN: CPT | Performed by: FAMILY MEDICINE

## 2022-04-28 PROCEDURE — 99233 SBSQ HOSP IP/OBS HIGH 50: CPT | Performed by: INTERNAL MEDICINE

## 2022-04-28 PROCEDURE — 250N000013 HC RX MED GY IP 250 OP 250 PS 637: Performed by: STUDENT IN AN ORGANIZED HEALTH CARE EDUCATION/TRAINING PROGRAM

## 2022-04-28 PROCEDURE — 250N000012 HC RX MED GY IP 250 OP 636 PS 637: Performed by: STUDENT IN AN ORGANIZED HEALTH CARE EDUCATION/TRAINING PROGRAM

## 2022-04-28 RX ORDER — CEFAZOLIN SODIUM 2 G/100ML
2 INJECTION, SOLUTION INTRAVENOUS ONCE
Status: CANCELLED | OUTPATIENT
Start: 2022-04-28 | End: 2022-04-28

## 2022-04-28 RX ORDER — ALBUMIN (HUMAN) 12.5 G/50ML
50 SOLUTION INTRAVENOUS
Status: DISCONTINUED | OUTPATIENT
Start: 2022-04-28 | End: 2022-04-30 | Stop reason: HOSPADM

## 2022-04-28 RX ADMIN — HEPARIN SODIUM 2500 UNITS: 1000 INJECTION INTRAVENOUS; SUBCUTANEOUS at 10:30

## 2022-04-28 RX ADMIN — INSULIN ASPART 1 UNITS: 100 INJECTION, SOLUTION INTRAVENOUS; SUBCUTANEOUS at 18:00

## 2022-04-28 RX ADMIN — AMLODIPINE BESYLATE 10 MG: 5 TABLET ORAL at 11:59

## 2022-04-28 RX ADMIN — SODIUM CHLORIDE 250 ML: 9 INJECTION, SOLUTION INTRAVENOUS at 10:32

## 2022-04-28 RX ADMIN — CLONIDINE HYDROCHLORIDE 0.1 MG: 0.1 TABLET ORAL at 04:05

## 2022-04-28 RX ADMIN — BUMETANIDE 5 MG: 0.25 INJECTION, SOLUTION INTRAMUSCULAR; INTRAVENOUS at 08:37

## 2022-04-28 RX ADMIN — SILVER SULFADIAZINE: 10 CREAM TOPICAL at 08:37

## 2022-04-28 RX ADMIN — MEROPENEM 500 MG: 500 INJECTION, POWDER, FOR SOLUTION INTRAVENOUS at 18:01

## 2022-04-28 RX ADMIN — SODIUM BICARBONATE 1300 MG: 648 TABLET ORAL at 13:34

## 2022-04-28 RX ADMIN — SODIUM BICARBONATE 1300 MG: 648 TABLET ORAL at 08:37

## 2022-04-28 RX ADMIN — HYDRALAZINE HYDROCHLORIDE 50 MG: 50 TABLET, FILM COATED ORAL at 11:59

## 2022-04-28 RX ADMIN — HYDRALAZINE HYDROCHLORIDE 50 MG: 50 TABLET, FILM COATED ORAL at 21:37

## 2022-04-28 RX ADMIN — CARVEDILOL 25 MG: 12.5 TABLET, FILM COATED ORAL at 11:59

## 2022-04-28 RX ADMIN — ONDANSETRON 4 MG: 2 INJECTION INTRAMUSCULAR; INTRAVENOUS at 13:34

## 2022-04-28 RX ADMIN — HYDRALAZINE HYDROCHLORIDE 50 MG: 50 TABLET, FILM COATED ORAL at 16:39

## 2022-04-28 RX ADMIN — CARVEDILOL 25 MG: 12.5 TABLET, FILM COATED ORAL at 16:38

## 2022-04-28 RX ADMIN — DOXAZOSIN 2 MG: 1 TABLET ORAL at 21:38

## 2022-04-28 RX ADMIN — ACETAMINOPHEN 650 MG: 325 TABLET ORAL at 04:05

## 2022-04-28 RX ADMIN — BUMETANIDE 5 MG: 0.25 INJECTION, SOLUTION INTRAMUSCULAR; INTRAVENOUS at 18:01

## 2022-04-28 RX ADMIN — CALCIUM CARBONATE (ANTACID) CHEW TAB 500 MG 500 MG: 500 CHEW TAB at 06:19

## 2022-04-28 RX ADMIN — ACETAMINOPHEN 650 MG: 325 TABLET ORAL at 23:59

## 2022-04-28 RX ADMIN — CALCIUM CARBONATE (ANTACID) CHEW TAB 500 MG 500 MG: 500 CHEW TAB at 16:38

## 2022-04-28 RX ADMIN — CALCIUM CARBONATE (ANTACID) CHEW TAB 500 MG 500 MG: 500 CHEW TAB at 11:09

## 2022-04-28 RX ADMIN — DOXAZOSIN 2 MG: 1 TABLET ORAL at 11:58

## 2022-04-28 RX ADMIN — Medication 50 MCG: at 11:59

## 2022-04-28 RX ADMIN — SODIUM BICARBONATE 1300 MG: 648 TABLET ORAL at 21:38

## 2022-04-28 ASSESSMENT — ACTIVITIES OF DAILY LIVING (ADL)
ADLS_ACUITY_SCORE: 4

## 2022-04-28 NOTE — TELEPHONE ENCOUNTER
I attempted to call [employer], was told that they did not have the numbers to the correct number or did not have access to them.I was informed to call the Provider/[employer] number and they will be able to help.  I called them and told to call [employer].  I was transferred and on-hold for almost 40 minutes.  I will call tomorrow to see if I can get a different Rep.
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
## 2022-04-28 NOTE — PROGRESS NOTES
Hemodialysis Treatment Note:    Total UF removedt:  2000 ml      Access:   Non-tunneled catheter R : Dressing clean, dry and intact. No s/s of infections. Both ports aspired and flushed without resistance. Prescribed  achieved.     Run Summary:   K3 bath 2.5hr treatment with goal initially set for 1.5 kg. Pt was hemodynamically stable during dialysis. Goal adjusted accordingly. 2.0kg pulled. Pt completed dialysis treatment without issues. Report given to primary nurse, JASON Monson.      Access (post dialysis) :   Catheter continue to be patent.  maintained during dialysis. Ports flushed with NS, and locked with Heparin 1:1000 for specific amount for cathter. Ports wrapped and secured with tape.     Interventions:  VS check q15min   Critline used for blood volume monitoring.    Plans:  Per renal team.    Abran Marks RN  [facility] Acute Dialysis ....................4/27/2022 11:55 PM

## 2022-04-28 NOTE — DISCHARGE INSTRUCTIONS
Change dressing to Lt hand every other day. Silver Sulfadiazine cream, nonadherent dressing, more Silver Sulfadiazine cream, Kerlix, and ace wrap.   2 week follow up to Plastic Surgery with Dr. Arango. Office number: [phone]       Tunneled Central Catheter Discharge Instructions:  You had a tunneled central access device placed. Part of the device is outside of your body and part of the device runs under the skin into a vein. Your nurses and doctors will use the tunneled catheter for your future treatments.    Care Instructions:   - If you received sedation for your procedure, do not drive or operate heavy machinery for the rest of the day.  - Keep dialysis catheter site dry. Do not get wet.  - Never immerse your catheter in water; no swimming, hot tubs, or tub baths.  - If you experience significant bleeding at site, apply pressure with hands above the clavicle bone, sit upright.     Seek medical assistance for any of the following:   - Uncontrolled bleeding.  - You have a fever (greater than 101 F (38.3C)).  - Purulent (yellow/green/foul smelling) drainage from catheter insertion site.  - Increasing redness at catheter site.  - Increasing pain at catheter site.  - Increasing swelling at catheter site.

## 2022-04-28 NOTE — PROGRESS NOTES
Hemodialysis Note:    Total UF Pulled:   3300 ml    Access:  Non-tunneled RIJ aspirated and flushed without difficulty. Catheter dressing C/D/I. Clamped, capped and locked with heparin 1:1000 units CVS instills post treatment. Reversed lines one hour into treatment. .    Run Summary:  Pt resting in bed upon arrival to room. Pt denies pain, shortness of breath or dyspnea. Pt states she had to sit up in bed to sleep due to dyspnea before dialysis treatment initiated. Pt now able to lay flat in bed and sleep. Oxygen on at 2L/NC. Oxygen sat's 98-99%. Lungs clear but diminished. Vital signs stable. Afebrile. LE edematous bilat. Pt tolerated treatment without incident. Net loss of 3kg today. 3K bath per protocol. No heparin. Pt off oxygen by end of treatment. Post wt on standing scale 83.25kg. Pt seem by Dr. Leslie during run.    Interventions:  Monitor vital signs every 15 min.  Reposition prn.  Crit-line management.    Plans:  Next treatment per renal team. Pt to get PCAD placed today.
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
## 2022-04-28 NOTE — SIGNIFICANT EVENT
Significant Event Note    Time of event: 4:09 AM April 28, 2022    Description of event:  House officer notified of fever in patient. Pt is a 37 year old female here with stage V kidney disease, with recent AV fistula placed, and sepsis of unclear cause. CT chest abd pelvis showing scattered pulmonary opacities, edema with pleural effusions, pericardial fluid, trace fluid in pelvis. Blood cultures unrevealing. UA neg. procal 0.43. WBC count WNL.     BP (!) 169/72 (BP Location: Right arm)   Pulse 85   Temp (!) 101.4  F (38.6  C) (Oral)   Resp 18   Ht 1.524 m (5')   Wt 87.2 kg (192 lb 3.2 oz)   LMP 03/31/2022 (Approximate)   SpO2 99%   BMI 37.54 kg/m        Plan:  Fever  Sepsis of unclear source  Will hold off on restarting vancomycin given renal function. Blood cultures neg.    -Continue meropenem.   -CBC + CRP  Pt otherwise hemodynamically stable.   -Continue to monitor    Discussed with: bedside nurse, text page sent to hospitalist Dr. Retana.    Cooper Stapleton,

## 2022-04-28 NOTE — PLAN OF CARE
Problem: Infection Progression (Sepsis/Septic Shock)  Goal: Absence of Infection Signs and Symptoms  Outcome: Ongoing, Not Progressing  Intervention: Promote Recovery  Recent Flowsheet Documentation  Taken 4/28/2022 0405 by Iona Colvin, RN  Activity Management: activity adjusted per tolerance  Taken 4/28/2022 0030 by Iona Colvin, RN  Activity Management: activity adjusted per tolerance     Goal Outcome Evaluation:         Spiked a fever of 101.4. dr. Balderrama notified, ordered some lab. Gave tylenol and fever went down to 99.4. On IV antibiotic, ID following. Had dialysis last night, took 2 kgs off per dialysis nurse. NPO for possible tunneled catheter placement today. Will have dialysis again today? Also got PRN clonidine for BP of 169/72, recheck BP was 135/64.

## 2022-04-28 NOTE — PROGRESS NOTES
Reviewed plan with nephrologist; awaiting line placement tomorrow. Nephrology note of today faxed to Ronel at [facility] at [phone].    Mercedes Black RN

## 2022-04-28 NOTE — PLAN OF CARE
Problem: Hyperglycemia  Goal: Blood Glucose Level Within Targeted Range  Outcome: Ongoing, Progressing   Patient was NPO throughout the shift, she was NPO for IR insertion of tunneled CVC HD cath. Procedure was cancelled and will be rescheduled for tomorrow 4/29    Problem: Fluid Volume Excess (Chronic Kidney Disease)  Goal: Fluid Balance  Outcome: Ongoing, Progressing      Patient had Dialysis again today (day 3). 2 K removed per Dialysis RN. Weight down from admission.    Problem: Infection Progression (Sepsis/Septic Shock)  Goal: Absence of Infection Signs and Symptoms  Outcome: Ongoing, Progressing   Patient afebrile throughout the shift.    Patient became nauseated after dialysis. Zofran was given, orange juice also given since patient was placed back on diet and she had been NPO all day.

## 2022-04-28 NOTE — PROGRESS NOTES
Renal progress note  CC: CKD 5 now ESRD  Assessment and Plan:  36-year-old female with a past medical history of CKD 5 progressed to ESRD secondary to diabetic nephropathy history of type 2 diabetes hypertension and obesity presented due to extreme fatigue weakness poor appetite and hypervolemia with pulmonary edema on 4/23/2020 admitted with possible suspicion of CAP and sepsis.  She had AVF placed awaiting maturation currently.  Nephrology consulted for progression of CKD 5 and uremia along with hypervolemia    CKD 5 progression to ESRD  Secondary to diabetic nephropathy nephrotic range proteinuria  Creatinine progressed to >9  AVF placed 4/13/2022  -- Nontunneled CVC placed 4/26/2022  First dialysis treatment 4/26/2020  -- Will be going to [address] dialysis on Monday Wednesday Friday 4:30 PM  -- Also needs referral to [facility] for transplant eval  -- ECHO stable  -- will plan for tunneled line placement today    Will call in orders, discussed with the AA at [facility] and they will take faxed orders  Treatment time 3 hours  Access CVC aVF maturing  Heparin dwell/locks  Low-dose heparin okay on dialysis  EPO 10,000 q. Treatment  Venofer and Hectorol per protocols     Target wt 82kg  UF tolerates upto 3L on HD  Will be assigned to Dr Lozaon per AA    Will need to discuss with ID for clearance for discharge and antibiotic plans  Okay to discharge from renal perspective once fever settle and cleared by ID    Hypervolemia with flash pulmonary edema  On diuretics bumetanide 5 mg IV twice daily metolazone 5 mg twice daily  Hypertension  On amlodipine 10 mg carvedilol 25 mgx2 Cardura 2 mg x 2 hydralazine 50 mg x3 with as needed clonidine  Keep blood pressure around 140-150/80  -- We will switch Bumex to torsemide 100 mg twice a day continue metolazone 5 mg twice a day  For now we will defer starting ARB for her outpatient nephrologist would likely need to have this started but there is in  place of hydralazine or add on    Electrolytes stable  Mild hypomagnesemia  Monitor for now    Metabolic acidosis likely secondary to poor renal reserve  We will keep on bicarb supplements until dialysis is initiated increased to 1300 mg 3 times a day  --Monitor on daily labs    Anemia  EPO dosed 4/19/22 40K, will start on 10K Qtx for now hb very low at 7s  Patient likely candidate for transplant avoid transfusions  -- We will keep on 40 K weekly  With ? infection will avoid iron for now    MBD  On Tums and calcitriol 0.25 x 3/week  Monitor Phos x2/week  PTH elevated to 539, vitamin D low at 9  D3 replacement 1000U daily    Pneumonia with ongoing fevers  Dialysis catheter placement was held today with blood cultures negative to date but less than 48 hours currently  We will keep on schedule for dialysis catheter placement tomorrow if blood cultures remain negative  On cefepime and Vanco started 4/23/2022--> on merem since 4/26  -- will defer decision to ID    Thank you for the consultation we will follow  Gabriele Leslie MD  [facility]  [phone]      Subjective  She is seen sitting on the side of the bed   Breathing is better fever breaking up, only 1 episode in last 24 hours  Tolerated dialysis well  Does not like the nontunneled catheter  Discussed need for dialysis today and will place a tunneled line   She will need othe medical clearance otherwise wll be all set to even dialyze as OP, ideally will try t dialyze early tomorrow before discharge    Objective    Vital signs in last 24 hours  Temp:  [97.5  F (36.4  C)-101.4  F (38.6  C)] 97.7  F (36.5  C)  Pulse:  [68-92] 77  Resp:  [16-18] 16  BP: (132-183)/(61-96) 156/69  SpO2:  [91 %-100 %] 93 %  Weight:   [unfilled]    Intake/Output last 3 shifts  I/O last 3 completed shifts:  In: 840 [P.O.:840]  Out: 3525 [Urine:1525; Other:2000]  Intake/Output this shift:  I/O this shift:  In: 0   Out: 500 [Urine:500]    Physical Exam  Alert/awake,  NAD  CV: RRR without murmur ??rub  Lung: clear and equal; no extra sounds  Ab: soft and NT; not distended; normal bs  Ext: +edema and well perfused  Skin; no rash  aVF thrill positive +swelling    Pertinent Labs   Lab Results   Component Value Date    WBC 3.7 (L) 04/28/2022    HGB 7.6 (L) 04/28/2022    HCT 25.4 (L) 04/28/2022    MCV 83 04/28/2022     04/28/2022     Lab Results   Component Value Date    BUN 73 (H) 04/25/2022     (L) 04/25/2022    CO2 18 (L) 04/25/2022       Lab Results   Component Value Date    ALBUMIN 2.6 (L) 04/24/2022     Lab Results   Component Value Date    PHOS 6.7 (H) 04/24/2022     I reviewed all lab results  Gabriele Leslie MD

## 2022-04-28 NOTE — PROGRESS NOTES
Interventional Radiology  4/28/2022      Tunneled HD catheter placement procedure requested by Sana Alonzo MD.    Please see full note from MARYSOL Joshua NP on 4/25/22 for patient's IR pre-procedure note.    NPO status reviewed: midnight    Clinical notes reviewed:   Patient with sepsis of unclear source  -4/13-left AVF placed but not currently ready for use  -4/26-patient with fever overnight and blood cultures pending - decision made for non-tunneled HD catheter placement at that time.  -4/27 - tunneled HD cath reordered for 4/28 as blood cultures remain negative  -4/28 - Blood cultures remain negative, WBC count WNL, UA negative; however, patient spiked fever overnight of 101.4 decreasing to 99.4 after tylenol administration.     Pertinent medications reviewed: No AC hold required; Continues on Mereopenem  Ancef for procedure (sign and held)      LABS:  CBC RESULTS: Recent Labs   Lab Test 04/28/22  0443   WBC 3.7*   RBC 3.07*   HGB 7.6*   HCT 25.4*   MCV 83   MCH 24.8*   MCHC f 29.9*   RDW 15.6*         No results found for: INR   Creatinine   Date Value Ref Range Status   04/25/2022 9.57 (HH) 0.60 - 1.10 mg/dL Final   11/08/2013 0.5 (L) 0.6 - 1.3 mg/dL Final       PLAN:  Spoke with Dr. Moran (ID) this AM 4/28 and will currently hold on tunneled line procedure until ID and Nephrology are able to discuss.    ADDENDUM 12:14 -   Patient continues to have negative blood cultures for 48hrs.   Discussed with Dr. Leslie (nephrology) who has spoken with Dr. Moran - feels at this point it is reasonable to continue with IR tunneled line placement today 4/28. Previous discussions suggest ID confident in intermittent fevers being viral and lung origin - not concerned for any bloodstream infection. Multiple blood culture results remain negative.    ADDENDUM 1330 -   Additional discussion with Dr. Leslie and Dr. Moran; patient is being kept an additional night r/t intermittent fevers. Team recommending non-tunneled  HD cath removal today 4/28 with tunneled HD catheter placement planned for 4/29 prior to discharge.    Salima Joshua, APRN CNP  Interventional Radiology  [phone]
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
## 2022-04-28 NOTE — PROGRESS NOTES
4/28/2022 1404   Right non tunnelled dialysis catheter pulled without complications . Previous bruseing noted prior to pull . Unchanged after catheter pull. Pressure dressing upon site .  Wendy WHITEHEAD)

## 2022-04-28 NOTE — PLAN OF CARE
Problem: Electrolyte Imbalance (Chronic Kidney Disease)  Goal: Electrolyte Balance  Outcome: Ongoing, Progressing     Problem: Fluid Volume Excess (Chronic Kidney Disease)  Goal: Fluid Balance  Outcome: Ongoing, Progressing     Problem: Functional Decline (Chronic Kidney Disease)  Goal: Optimal Functional Ability  Outcome: Ongoing, Progressing     The pt was supposed to get dialysis this afternoon but they were running behind so it got pushed back to this evening at 2000. The pt was told to call when she was ready to eat -family bringing in food so we could check and cover her BS- she didn't do that. BS at 2230 was 115. Bp's running high during dialysis run- dialysis nurse ok 'd giving Bp meds that were held earlier.

## 2022-04-29 ENCOUNTER — APPOINTMENT (OUTPATIENT)
Dept: INTERVENTIONAL RADIOLOGY/VASCULAR | Facility: HOSPITAL | Age: 37
End: 2022-04-29
Attending: INTERNAL MEDICINE
Payer: COMMERCIAL

## 2022-04-29 LAB
BACTERIA BLD CULT: NO GROWTH
BACTERIA BLD CULT: NO GROWTH
GLUCOSE BLDC GLUCOMTR-MCNC: 103 MG/DL (ref 70–99)
GLUCOSE BLDC GLUCOMTR-MCNC: 109 MG/DL (ref 70–99)
GLUCOSE BLDC GLUCOMTR-MCNC: 174 MG/DL (ref 70–99)
GLUCOSE BLDC GLUCOMTR-MCNC: 94 MG/DL (ref 70–99)
GLUCOSE BLDC GLUCOMTR-MCNC: 95 MG/DL (ref 70–99)
HOLD SPECIMEN: NORMAL
LACTATE SERPL-SCNC: 0.7 MMOL/L (ref 0.7–2)
MAGNESIUM SERPL-MCNC: 1.6 MG/DL (ref 1.8–2.6)
POTASSIUM BLD-SCNC: 3.5 MMOL/L (ref 3.5–5)

## 2022-04-29 PROCEDURE — 99233 SBSQ HOSP IP/OBS HIGH 50: CPT | Mod: 24 | Performed by: INTERNAL MEDICINE

## 2022-04-29 PROCEDURE — 83605 ASSAY OF LACTIC ACID: CPT | Performed by: INTERNAL MEDICINE

## 2022-04-29 PROCEDURE — 250N000013 HC RX MED GY IP 250 OP 250 PS 637: Performed by: INTERNAL MEDICINE

## 2022-04-29 PROCEDURE — 250N000013 HC RX MED GY IP 250 OP 250 PS 637: Performed by: STUDENT IN AN ORGANIZED HEALTH CARE EDUCATION/TRAINING PROGRAM

## 2022-04-29 PROCEDURE — 90935 HEMODIALYSIS ONE EVALUATION: CPT

## 2022-04-29 PROCEDURE — 36415 COLL VENOUS BLD VENIPUNCTURE: CPT | Performed by: FAMILY MEDICINE

## 2022-04-29 PROCEDURE — 272N000500 HC NEEDLE CR2

## 2022-04-29 PROCEDURE — C1769 GUIDE WIRE: HCPCS

## 2022-04-29 PROCEDURE — 250N000011 HC RX IP 250 OP 636: Performed by: INTERNAL MEDICINE

## 2022-04-29 PROCEDURE — 83735 ASSAY OF MAGNESIUM: CPT | Performed by: FAMILY MEDICINE

## 2022-04-29 PROCEDURE — 272N000602 HC WOUND GLUE CR1

## 2022-04-29 PROCEDURE — 84132 ASSAY OF SERUM POTASSIUM: CPT | Performed by: FAMILY MEDICINE

## 2022-04-29 PROCEDURE — 250N000011 HC RX IP 250 OP 636: Performed by: NURSE PRACTITIONER

## 2022-04-29 PROCEDURE — 99232 SBSQ HOSP IP/OBS MODERATE 35: CPT | Performed by: INTERNAL MEDICINE

## 2022-04-29 PROCEDURE — 250N000011 HC RX IP 250 OP 636: Performed by: RADIOLOGY

## 2022-04-29 PROCEDURE — 99152 MOD SED SAME PHYS/QHP 5/>YRS: CPT

## 2022-04-29 PROCEDURE — 36415 COLL VENOUS BLD VENIPUNCTURE: CPT | Performed by: INTERNAL MEDICINE

## 2022-04-29 PROCEDURE — C1750 CATH, HEMODIALYSIS,LONG-TERM: HCPCS

## 2022-04-29 PROCEDURE — 258N000003 HC RX IP 258 OP 636: Performed by: INTERNAL MEDICINE

## 2022-04-29 PROCEDURE — 210N000001 HC R&B IMCU HEART CARE

## 2022-04-29 PROCEDURE — 250N000011 HC RX IP 250 OP 636: Performed by: STUDENT IN AN ORGANIZED HEALTH CARE EDUCATION/TRAINING PROGRAM

## 2022-04-29 RX ORDER — TORSEMIDE 100 MG/1
100 TABLET ORAL
Status: DISCONTINUED | OUTPATIENT
Start: 2022-04-29 | End: 2022-04-30 | Stop reason: HOSPADM

## 2022-04-29 RX ORDER — HEPARIN SODIUM 1000 [USP'U]/ML
4100 INJECTION, SOLUTION INTRAVENOUS; SUBCUTANEOUS ONCE
Status: COMPLETED | OUTPATIENT
Start: 2022-04-29 | End: 2022-04-29

## 2022-04-29 RX ORDER — SILVER SULFADIAZINE 10 MG/G
CREAM TOPICAL DAILY
Qty: 25 G | Refills: 1 | Status: SHIPPED | OUTPATIENT
Start: 2022-04-30 | End: 2022-06-14

## 2022-04-29 RX ORDER — TORSEMIDE 100 MG/1
100 TABLET ORAL
Qty: 28 TABLET | Refills: 1 | Status: SHIPPED | OUTPATIENT
Start: 2022-04-29 | End: 2022-06-14

## 2022-04-29 RX ORDER — LEVOFLOXACIN 500 MG/1
500 TABLET, FILM COATED ORAL
Qty: 2 TABLET | Refills: 0 | Status: SHIPPED | OUTPATIENT
Start: 2022-05-01 | End: 2022-06-14

## 2022-04-29 RX ORDER — LEVOFLOXACIN 500 MG/1
500 TABLET, FILM COATED ORAL
Status: DISCONTINUED | OUTPATIENT
Start: 2022-05-01 | End: 2022-04-30 | Stop reason: HOSPADM

## 2022-04-29 RX ORDER — DOXAZOSIN 2 MG/1
2 TABLET ORAL 2 TIMES DAILY
Qty: 28 TABLET | Refills: 0 | Status: SHIPPED | OUTPATIENT
Start: 2022-04-29 | End: 2022-06-14

## 2022-04-29 RX ORDER — LEVOFLOXACIN 750 MG/1
750 TABLET, FILM COATED ORAL ONCE
Status: COMPLETED | OUTPATIENT
Start: 2022-04-29 | End: 2022-04-29

## 2022-04-29 RX ORDER — VITAMIN B COMPLEX
50 TABLET ORAL DAILY
Qty: 60 TABLET | Refills: 0 | Status: SHIPPED | OUTPATIENT
Start: 2022-04-30 | End: 2022-05-30

## 2022-04-29 RX ORDER — MAGNESIUM OXIDE 400 MG/1
400 TABLET ORAL DAILY
Qty: 2 TABLET | Refills: 0 | Status: SHIPPED | OUTPATIENT
Start: 2022-04-29 | End: 2022-06-14

## 2022-04-29 RX ADMIN — BUMETANIDE 5 MG: 0.25 INJECTION, SOLUTION INTRAMUSCULAR; INTRAVENOUS at 08:59

## 2022-04-29 RX ADMIN — FENTANYL CITRATE 25 MCG: 50 INJECTION, SOLUTION INTRAMUSCULAR; INTRAVENOUS at 14:27

## 2022-04-29 RX ADMIN — DOXAZOSIN 2 MG: 1 TABLET ORAL at 21:36

## 2022-04-29 RX ADMIN — TORSEMIDE 100 MG: 100 TABLET ORAL at 19:10

## 2022-04-29 RX ADMIN — AMLODIPINE BESYLATE 10 MG: 5 TABLET ORAL at 09:03

## 2022-04-29 RX ADMIN — FENTANYL CITRATE 50 MCG: 50 INJECTION, SOLUTION INTRAMUSCULAR; INTRAVENOUS at 14:22

## 2022-04-29 RX ADMIN — HYDRALAZINE HYDROCHLORIDE 50 MG: 50 TABLET, FILM COATED ORAL at 13:02

## 2022-04-29 RX ADMIN — CALCIUM CARBONATE (ANTACID) CHEW TAB 500 MG 500 MG: 500 CHEW TAB at 11:29

## 2022-04-29 RX ADMIN — SODIUM BICARBONATE 1300 MG: 648 TABLET ORAL at 13:02

## 2022-04-29 RX ADMIN — HEPARIN SODIUM 4100 UNITS: 1000 INJECTION INTRAVENOUS; SUBCUTANEOUS at 14:31

## 2022-04-29 RX ADMIN — Medication 50 MCG: at 09:02

## 2022-04-29 RX ADMIN — ONDANSETRON 4 MG: 2 INJECTION INTRAMUSCULAR; INTRAVENOUS at 15:33

## 2022-04-29 RX ADMIN — CARVEDILOL 25 MG: 12.5 TABLET, FILM COATED ORAL at 19:10

## 2022-04-29 RX ADMIN — MIDAZOLAM HYDROCHLORIDE 1 MG: 1 INJECTION, SOLUTION INTRAMUSCULAR; INTRAVENOUS at 14:16

## 2022-04-29 RX ADMIN — SODIUM BICARBONATE 1300 MG: 648 TABLET ORAL at 21:36

## 2022-04-29 RX ADMIN — CALCITRIOL 0.25 MCG: 0.25 CAPSULE ORAL at 09:03

## 2022-04-29 RX ADMIN — SODIUM BICARBONATE 1300 MG: 648 TABLET ORAL at 08:59

## 2022-04-29 RX ADMIN — DOXAZOSIN 2 MG: 1 TABLET ORAL at 08:59

## 2022-04-29 RX ADMIN — CARVEDILOL 25 MG: 12.5 TABLET, FILM COATED ORAL at 09:02

## 2022-04-29 RX ADMIN — SODIUM CHLORIDE 250 ML: 9 INJECTION, SOLUTION INTRAVENOUS at 18:17

## 2022-04-29 RX ADMIN — SILVER SULFADIAZINE: 10 CREAM TOPICAL at 09:00

## 2022-04-29 RX ADMIN — HYDRALAZINE HYDROCHLORIDE 50 MG: 50 TABLET, FILM COATED ORAL at 08:59

## 2022-04-29 RX ADMIN — LEVOFLOXACIN 750 MG: 750 TABLET, FILM COATED ORAL at 11:29

## 2022-04-29 RX ADMIN — CALCIUM CARBONATE (ANTACID) CHEW TAB 500 MG 500 MG: 500 CHEW TAB at 19:10

## 2022-04-29 RX ADMIN — HYDRALAZINE HYDROCHLORIDE 50 MG: 50 TABLET, FILM COATED ORAL at 21:36

## 2022-04-29 ASSESSMENT — ACTIVITIES OF DAILY LIVING (ADL)
ADLS_ACUITY_SCORE: 4
ADLS_ACUITY_SCORE: 4
ADLS_ACUITY_SCORE: 6
ADLS_ACUITY_SCORE: 4
ADLS_ACUITY_SCORE: 4
ADLS_ACUITY_SCORE: 6
ADLS_ACUITY_SCORE: 4
ADLS_ACUITY_SCORE: 4
ADLS_ACUITY_SCORE: 6
ADLS_ACUITY_SCORE: 4
ADLS_ACUITY_SCORE: 4
ADLS_ACUITY_SCORE: 6
ADLS_ACUITY_SCORE: 4
ADLS_ACUITY_SCORE: 6
ADLS_ACUITY_SCORE: 4
ADLS_ACUITY_SCORE: 6

## 2022-04-29 NOTE — PROGRESS NOTES
Pt returned to in-patient room.  C/o nausea post procedure.  Bedside handoff given to Leeann GOMEZ.

## 2022-04-29 NOTE — PLAN OF CARE
Problem: Hyperglycemia  Goal: Blood Glucose Level Within Targeted Range  Outcome: Ongoing, Progressing     Problem: Electrolyte Imbalance (Chronic Kidney Disease)  Goal: Electrolyte Balance  Outcome: Ongoing, Progressing     Problem: Renal Function Impairment (Chronic Kidney Disease)  Goal: Minimize Renal Failure Effects    VSS. NSR LQT. No complaints of pain. NPO at midnight for her tunneled catheter procedure tomorrow.

## 2022-04-29 NOTE — PROGRESS NOTES
Renal progress note  CC: CKD 5 now ESRD  Assessment and Plan:  36-year-old female with a past medical history of CKD 5 progressed to ESRD secondary to diabetic nephropathy history of type 2 diabetes hypertension and obesity presented due to extreme fatigue weakness poor appetite and hypervolemia with pulmonary edema on 4/23/2020 admitted with possible suspicion of CAP and sepsis.  She had AVF placed awaiting maturation currently.  Nephrology consulted for progression of CKD 5 and uremia along with hypervolemia    CKD 5 progression to ESRD  Secondary to diabetic nephropathy nephrotic range proteinuria  Creatinine progressed to >9  AVF placed 4/13/2022  -- Nontunneled CVC placed 4/26/2022  First dialysis treatment 4/26/2020  -- Will be going to [facility] dialysis on Monday Wednesday Friday 4:30 PM  -- Also needs referral to [facility] for transplant eval  -- ECHO stable  -- will plan for tunneled line placement today  With the Tx delayed till later today , will need to discharge in am   Otherwise cleared for discharge after dialysis today    Will call in orders, discussed with the AA at [facility] and they will take faxed orders    Treatment time 3 hours  Access CVC aVF maturing  Heparin dwell/locks  Low-dose heparin okay on dialysis  EPO 10,000 q. Treatment  Venofer and Hectorol per protocols     Target wt 82kg  UF tolerates upto 3L on HD  Will be assigned to Dr Lozano per AA    Hypervolemia with flash pulmonary edema  On diuretics bumetanide 5 mg IV twice daily metolazone 5 mg twice daily  Hypertension  On amlodipine 10 mg carvedilol 25 mgx2 Cardura 2 mg x 2 hydralazine 50 mg x3 with as needed clonidine  Keep blood pressure around 140-150/80  -- We will switch Bumex to torsemide 100 mg twice a day continue metolazone 5 mg twice a day  For now we will defer starting ARB for her outpatient nephrologist would likely need to have this started but there is in place of hydralazine or add  on    Electrolytes stable  Mild hypomagnesemia  Monitor for now    Metabolic acidosis likely secondary to poor renal reserve  We will keep on bicarb supplements until dialysis is initiated increased to 1300 mg 3 times a day  --Monitor on daily labs    Anemia  EPO dosed 4/19/22 40K, will start on 10K Qtx for now hb very low at 7s  Patient likely candidate for transplant avoid transfusions  -- We will keep on 40 K weekly  With ? infection will avoid iron for now    MBD  On Tums and calcitriol 0.25 x 3/week  Monitor Phos x2/week  PTH elevated to 539, vitamin D low at 9  D3 replacement 1000U daily    Pneumonia with ongoing fevers  Dialysis catheter placement was held today with blood cultures negative to date but less than 48 hours currently  We will keep on schedule for dialysis catheter placement tomorrow if blood cultures remain negative  On cefepime and Vanco started 4/23/2022--> on merem since 4/26  -- will defer decision to ID    Thank you for the consultation we will follow  Gabriele Leslie MD  [facility]  [phone]      Subjective  She is seen sitting on the side of the bed   Breathing is better fever breaking up, only 1 episode in last 24 hours  Tolerated dialysis well  Does not like the nontunneled catheter  Discussed need for dialysis today and will place a tunneled line   She will need othe medical clearance otherwise wll be all set to even dialyze as OP, ideally will try t dialyze early tomorrow before discharge    Objective    Vital signs in last 24 hours  Temp:  [97.3  F (36.3  C)-100.8  F (38.2  C)] 98.4  F (36.9  C)  Pulse:  [68-83] 75  Resp:  [16-20] 16  BP: (122-166)/(58-75) 122/58  SpO2:  [91 %-97 %] 96 %  Weight:   [unfilled]    Intake/Output last 3 shifts  I/O last 3 completed shifts:  In: 237 [P.O.:120; I.V.:117]  Out: 1200 [Urine:1200]  Intake/Output this shift:  No intake/output data recorded.    Physical Exam  Alert/awake, NAD  CV: RRR without murmur ??rub  Lung: clear  and equal; no extra sounds  Ab: soft and NT; not distended; normal bs  Ext: +edema and well perfused  Skin; no rash  aVF thrill positive +swelling    Pertinent Labs   Lab Results   Component Value Date    WBC 3.7 (L) 04/28/2022    HGB 7.6 (L) 04/28/2022    HCT 25.4 (L) 04/28/2022    MCV 83 04/28/2022     04/28/2022     Lab Results   Component Value Date    BUN 73 (H) 04/25/2022     (L) 04/25/2022    CO2 18 (L) 04/25/2022       Lab Results   Component Value Date    ALBUMIN 2.6 (L) 04/24/2022     Lab Results   Component Value Date    PHOS 6.7 (H) 04/24/2022     I reviewed all lab results  Gabriele Leslie MD

## 2022-04-29 NOTE — PRE-PROCEDURE
GENERAL PRE-PROCEDURE:   Procedure:  TDC placement  Date/Time:  4/29/2022 2:18 PM    Verbal consent obtained?: Yes    Written consent obtained?: Yes    Risks and benefits: Risks, benefits and alternatives were discussed    Consent given by:  Patient  Patient states understanding of procedure being performed: Yes    Patient's understanding of procedure matches consent: Yes    Procedure consent matches procedure scheduled: Yes    Expected level of sedation:  Moderate  Appropriately NPO:  Yes  ASA Class:  2  Mallampati  :  Grade 2- soft palate, base of uvula, tonsillar pillars, and portion of posterior pharyngeal wall visible  Lungs:  Lungs clear with good breath sounds bilaterally  Heart:  Normal heart sounds and rate  History & Physical reviewed:  History and physical reviewed and no updates needed  Statement of review:  I have reviewed the lab findings, diagnostic data, medications, and the plan for sedation

## 2022-04-29 NOTE — PROGRESS NOTES
[facility] welcome letter left in patients room.         Chair Held M/W/F 4:30 PM at [facility]    Start date 5/2. Patient should arrive at 1530 on first day.     Will need orders faxed to [facility] when available.     3:43 PM  Nephrologist note indicates that orders will be called in to [facility]
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
## 2022-04-29 NOTE — PROGRESS NOTES
Interventional Radiology  4/29/2022      Tunneled HD catheter placement procedure requested by Sana Alonzo MD.    Please see full note from MARYSOL Joshua NP on 4/25/22 for patient's IR pre-procedure note.    NPO status reviewed: midnight    Clinical notes reviewed    Pertinent medications reviewed: No AC hold required; ancef ordered previously for procedure (sign and held) also remains on Meropenem      LABS:  CBC RESULTS: Recent Labs   Lab Test 04/28/22  0443   WBC 3.7*   RBC 3.07*   HGB 7.6*   HCT 25.4*   MCV 83   MCH 24.8*   MCHC 29.9*   RDW 15.6*         No results found for: INR   Creatinine   Date Value Ref Range Status   04/25/2022 9.57 (HH) 0.60 - 1.10 mg/dL Final   11/08/2013 0.5 (L) 0.6 - 1.3 mg/dL Final       PLAN:  -Blood cultures remain negative. Temperature remains WNL.   -Per conversation with ID and Nephrology yesterday 4/28 will continue with tunneled HD cath placement today 4/29 prior to discharge unless blood cultures are positive.     Chart reviewed today and patient appropriate to proceed with above procedure.    MARGA Venegas CNP  Interventional Radiology  [phone]

## 2022-04-29 NOTE — PROCEDURES
[facility]    Procedure: IR Procedure Note    Date/Time: 4/29/2022 2:36 PM  Performed by: Aden Boone MD  Authorized by: Aden Boone MD       UNIVERSAL PROTOCOL   Site Marked: NA  Prior Images Obtained and Reviewed:  Yes  Required items: Required blood products, implants, devices and special equipment available    Patient identity confirmed:  Verbally with patient, arm band, provided demographic data and hospital-assigned identification number  Patient was reevaluated immediately before administering moderate or deep sedation or anesthesia  Confirmation Checklist:  Patient's identity using two indicators, relevant allergies, procedure was appropriate and matched the consent or emergent situation and correct equipment/implants were available  Time out: Immediately prior to the procedure a time out was called    Universal Protocol: the Joint Commission Universal Protocol was followed    Preparation: Patient was prepped and draped in usual sterile fashion       ANESTHESIA    Anesthesia: Local infiltration  Local Anesthetic:  Lidocaine 1% without epinephrine      SEDATION  Patient Sedated: Yes    Vital signs: Vital signs monitored during sedation    See dictated procedure note for full details.  Findings: TDC as per PACS.    Specimens: none    Complications: None    Condition: Stable    Plan: Interventional Radiology Post-Procedure Note    Procedure: Tunneled dialysis catheter (TDC) placement.    Attending: Aden Boone MD    Findings: Ultrasound and fluoroscopic placement of a tunneled dialysis catheter.     Plan: The tunneled dialysis catheter is ready for use.      PROCEDURE    Patient Tolerance:  Patient tolerated the procedure well with no immediate complications  Length of time physician/provider present for 1:1 monitoring during sedation: 15
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
## 2022-04-29 NOTE — PLAN OF CARE
Problem: Infection Progression (Sepsis/Septic Shock)  Goal: Absence of Infection Signs and Symptoms  Outcome: Ongoing, Progressing  Intervention: Promote Recovery  Recent Flowsheet Documentation  Taken 4/28/2022 2340 by Marcie Barton RN  Activity Management:   activity adjusted per tolerance   up ad abdelrahman    Pt had temp of 100.8 at start shift relieved with Tylenol 650mg PO.   Latest temp 97.3.  Pt continues on IV antibiotic.    Problem: Renal Function Impairment (Chronic Kidney Disease)  Goal: Minimize Renal Failure Effects  Outcome: Ongoing, Progressing     Pt NPO since midnight for tunnel catheter placement today.  Pt does still void despite CKD 5 and initiation of dialysis.

## 2022-04-30 VITALS
SYSTOLIC BLOOD PRESSURE: 136 MMHG | WEIGHT: 181.9 LBS | HEART RATE: 76 BPM | BODY MASS INDEX: 35.71 KG/M2 | HEIGHT: 60 IN | DIASTOLIC BLOOD PRESSURE: 65 MMHG | RESPIRATION RATE: 18 BRPM | OXYGEN SATURATION: 97 % | TEMPERATURE: 100.1 F

## 2022-04-30 LAB
GLUCOSE BLDC GLUCOMTR-MCNC: 124 MG/DL (ref 70–99)
MAGNESIUM SERPL-MCNC: 1.7 MG/DL (ref 1.8–2.6)
POTASSIUM BLD-SCNC: 4 MMOL/L (ref 3.5–5)

## 2022-04-30 PROCEDURE — 36415 COLL VENOUS BLD VENIPUNCTURE: CPT | Performed by: INTERNAL MEDICINE

## 2022-04-30 PROCEDURE — 99239 HOSP IP/OBS DSCHRG MGMT >30: CPT | Performed by: INTERNAL MEDICINE

## 2022-04-30 PROCEDURE — 250N000013 HC RX MED GY IP 250 OP 250 PS 637: Performed by: STUDENT IN AN ORGANIZED HEALTH CARE EDUCATION/TRAINING PROGRAM

## 2022-04-30 PROCEDURE — 84132 ASSAY OF SERUM POTASSIUM: CPT | Performed by: INTERNAL MEDICINE

## 2022-04-30 PROCEDURE — 250N000013 HC RX MED GY IP 250 OP 250 PS 637: Performed by: INTERNAL MEDICINE

## 2022-04-30 PROCEDURE — 83735 ASSAY OF MAGNESIUM: CPT | Performed by: INTERNAL MEDICINE

## 2022-04-30 RX ADMIN — CALCIUM CARBONATE (ANTACID) CHEW TAB 500 MG 500 MG: 500 CHEW TAB at 08:50

## 2022-04-30 RX ADMIN — AMLODIPINE BESYLATE 10 MG: 5 TABLET ORAL at 08:51

## 2022-04-30 RX ADMIN — CARVEDILOL 25 MG: 12.5 TABLET, FILM COATED ORAL at 08:50

## 2022-04-30 RX ADMIN — SODIUM BICARBONATE 1300 MG: 648 TABLET ORAL at 08:50

## 2022-04-30 RX ADMIN — DOXAZOSIN 2 MG: 1 TABLET ORAL at 08:50

## 2022-04-30 RX ADMIN — SILVER SULFADIAZINE: 10 CREAM TOPICAL at 08:57

## 2022-04-30 RX ADMIN — Medication 50 MCG: at 08:50

## 2022-04-30 RX ADMIN — HYDRALAZINE HYDROCHLORIDE 50 MG: 50 TABLET, FILM COATED ORAL at 08:51

## 2022-04-30 RX ADMIN — TORSEMIDE 100 MG: 100 TABLET ORAL at 08:50

## 2022-04-30 ASSESSMENT — ACTIVITIES OF DAILY LIVING (ADL)
ADLS_ACUITY_SCORE: 4
ADLS_ACUITY_SCORE: 6
ADLS_ACUITY_SCORE: 4

## 2022-04-30 NOTE — PLAN OF CARE
Problem: Plan of Care - These are the overarching goals to be used throughout the patient stay.    Goal: Plan of Care Review/Shift Note  Description: The Plan of Care Review/Shift note should be completed every shift.  The Outcome Evaluation is a brief statement about your assessment that the patient is improving, declining, or no change.  This information will be displayed automatically on your shift note.  Outcome: Ongoing, Progressing     No acute events overnight. VSS, denied any respiratory distress or pain.  Pt finished dialysis late evening shift and was nauseated. Pt has denied any nausea overnight, so patient will be discharged this AM.

## 2022-04-30 NOTE — PROGRESS NOTES
Hemodialysis Treatment Note:            Total UF pulled: 2000        Access:    Tunneled catheter with clean, dry and intact dressing. Both ports aspirated and flushed easily.      Run Summary:   K3 bath 3 hr treatment with goal set for 3.5 kg. Pt was hemodynamically stable during dialysis. Goal increased accordingly.  Pt completed dialysis treatment without issues. Report given to primary nurse,  Seen by  Dr. GUTIERREZ on dialysis.    Access (post dialysis) :    Tunneled catheter with clean, dry and intact dressing. Heparin instilled to fill volumes for dwell. Clamped, capped and secured.     Interventions:  VS check q15min   Critline used for blood volume monitoring.     Plans:  Per renal team.

## 2022-04-30 NOTE — PROGRESS NOTES
"Care Management Discharge Note    Discharge Date: 04/30/2022       Discharge Disposition: Home    Discharge Services:  Dialysis Services    Discharge DME:      Discharge Transportation:      Private pay costs discussed: Not applicable    PAS Confirmation Code:    Patient/family educated on Medicare website which has current facility and service quality ratings:      Education Provided on the Discharge Plan:    Persons Notified of Discharge Plans: MD has placed orders  Patient/Family in Agreement with the Plan:      Handoff Referral Completed: No    Additional Information:    [facility] faxed orders this morning to Ronel at [facility]] at [phone]. Previously, \"[facility] welcome letter left in patients room. Chair Held M/W/F 4:30 PM at [facility]. Start date 5/2. Patient should arrive at 1530 on first day...Nephrologist note indicates that orders will be called in to [facility]\". Patient will return home today ; Family transport anticipated. CM following if needed.         JUAN Adams        "

## 2022-04-30 NOTE — PLAN OF CARE
Goal Outcome Evaluation:    Plan of Care Reviewed With: patient     Pt alert and oriented x4 and able to make needs known.  Pt independent in room.  VSS, tolerating diet this am.  Nauseated on and off but feeling much better then yesterday.  States she is ready to discharge.  Orders to discharge patient.  Discharge instructions given to patient with verbalizing understanding.

## 2022-04-30 NOTE — PLAN OF CARE
Problem: Plan of Care - These are the overarching goals to be used throughout the patient stay.    Goal: Plan of Care Review/Shift Note  Description: The Plan of Care Review/Shift note should be completed every shift.  The Outcome Evaluation is a brief statement about your assessment that the patient is improving, declining, or no change.  This information will be displayed automatically on your shift note.  Outcome: Ongoing, Progressing     Problem: Hyperglycemia  Goal: Blood Glucose Level Within Targeted Range  Outcome: Ongoing, Progressing     Problem: Adjustment to Illness (Chronic Kidney Disease)  Goal: Optimal Coping with Chronic Illness  Outcome: Ongoing, Progressing     Problem: Pain (Chronic Kidney Disease)  Goal: Acceptable Pain Control  Outcome: Ongoing, Progressing     Problem: Adjustment to Illness (Sepsis/Septic Shock)  Goal: Optimal Coping  Outcome: Ongoing, Progressing     Problem: Plan of Care - These are the overarching goals to be used throughout the patient stay.    Goal: Absence of Hospital-Acquired Illness or Injury  Intervention: Identify and Manage Fall Risk  Recent Flowsheet Documentation  Taken 4/29/2022 1559 by Pennie Morfin RN  Safety Promotion/Fall Prevention:   clutter free environment maintained   nonskid shoes/slippers when out of bed  Intervention: Prevent Skin Injury  Recent Flowsheet Documentation  Taken 4/29/2022 1559 by Pennie Morfin RN  Body Position: position changed independently  Taken 4/29/2022 1540 by Pennie Morfin RN  Body Position: position changed independently  Intervention: Prevent and Manage VTE (Venous Thromboembolism) Risk  Recent Flowsheet Documentation  Taken 4/29/2022 1559 by Pennie Morfin RN  Activity Management: activity adjusted per tolerance     Problem: Functional Decline (Chronic Kidney Disease)  Goal: Optimal Functional Ability  Intervention: Optimize Functional Ability  Recent Flowsheet Documentation  Taken 4/29/2022 1559 by Pennie Morfin RN  Activity  Management: activity adjusted per tolerance     Problem: Renal Function Impairment (Chronic Kidney Disease)  Goal: Minimize Renal Failure Effects  Intervention: Monitor and Support Renal Function  Recent Flowsheet Documentation  Taken 4/29/2022 1559 by Pennie Morfin, RN  Medication Review/Management:   high-risk medications identified   medications reviewed     Problem: Infection Progression (Sepsis/Septic Shock)  Goal: Absence of Infection Signs and Symptoms  Intervention: Promote Recovery  Recent Flowsheet Documentation  Taken 4/29/2022 1559 by Pennie Morfin, RN  Activity Management: activity adjusted per tolerance   Goal Outcome Evaluation:  Alysia had a quiet shift. Tunneled line placed without difficulty. No complaints of pain this shift. Did have some postprocedure nausea that was helped by zofran. Tolerated dialysis well. Hoping to discharge tomorrow
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
## 2022-05-01 LAB
BACTERIA BLD CULT: NO GROWTH
BACTERIA BLD CULT: NO GROWTH

## 2022-05-02 ENCOUNTER — PATIENT OUTREACH (OUTPATIENT)
Dept: CARE COORDINATION | Facility: CLINIC | Age: 37
End: 2022-05-02
Payer: COMMERCIAL

## 2022-05-02 DIAGNOSIS — Z71.89 OTHER SPECIFIED COUNSELING: ICD-10-CM

## 2022-05-02 NOTE — PROGRESS NOTES
Clinic Care Coordination Contact  [facility]/Voicemail       Clinical Data: Care Coordinator Outreach  Outreach attempted x 1.  Left message on patient's voicemail with call back information and requested return call.  Plan: Care Coordinator will try to reach patient again in 1-2 business days.    GIL Moseley  [phone]  [facility]

## 2022-05-03 NOTE — PROGRESS NOTES
Clinic Care Coordination Contact  [facility]/Voicemail       Clinical Data: Care Coordinator Outreach  Outreach attempted x 2.  Left message on patient's voicemail with call back information and requested return call.  Plan: Care Coordinator will do no further outreaches at this time.    GIL Moseley  [phone]  [facility]

## 2022-05-03 NOTE — TELEPHONE ENCOUNTER
Central Prior Authorization Team   Phone: [phone]      I called [employer] ([phone]) and spoke with a rep whom stated that the patient's benefits with them ended at the end of April and they could not help with finding out the status of the appeal as since the benefits ended, she no longer had access.    Please have the patient contact Leonel.      I will not be able to go forward checking on the appeal since her coverage ended.

## 2022-05-03 NOTE — TELEPHONE ENCOUNTER
Left message to call back for: Prior auth  Information to relay to patient: LMTCB, Please see message below.

## 2022-05-04 NOTE — TELEPHONE ENCOUNTER
Left message to call back for: Appeal  Information to relay to patient: LMTCB, Please see message below

## 2022-05-27 RX ORDER — GLIPIZIDE 5 MG/1
5 TABLET, FILM COATED, EXTENDED RELEASE ORAL DAILY
Qty: 90 TABLET | Refills: 3 | OUTPATIENT
Start: 2022-05-27

## 2022-05-27 NOTE — TELEPHONE ENCOUNTER
"Routing refill request to provider for review/approval because:  Drug not active on patient's medication list    Last Written Prescription Date:    Last Fill Quantity: ,  # refills:    Last office visit provider:  4/4/22     Requested Prescriptions   Pending Prescriptions Disp Refills     glipiZIDE (GLUCOTROL XL) 5 MG 24 hr tablet 90 tablet 3     Sig: Take 1 tablet (5 mg) by mouth daily       Sulfonylurea Agents Failed - 5/25/2022  3:26 PM        Failed - Medication is active on med list        Failed - Patient has a recent creatinine (normal) within the past 12 mos.     Recent Labs   Lab Test 04/25/22  0441   CR 9.57*       Ok to refill medication if creatinine is low          Passed - Patient has documented A1c within the specified period of time.     If HgbA1C is 8 or greater, it needs to be on file within the past 3 months.  If less than 8, must be on file within the past 6 months.     Recent Labs   Lab Test 04/04/22  1158   A1C 5.0             Passed - Patient is age 18 or older        Passed - No active pregnancy on record        Passed - Patient has not had a positive pregnancy test within the past 12 mos.        Passed - Recent (6 mo) or future (30 days) visit within the authorizing provider's specialty     Patient had office visit in the last 6 months or has a visit in the next 30 days with authorizing provider or within the authorizing provider's specialty.  See \"Patient Info\" tab in inbasket, or \"Choose Columns\" in Meds & Orders section of the refill encounter.                 Shadia Bui RN 05/26/22 7:08 PM  "

## 2022-06-01 ENCOUNTER — ANCILLARY PROCEDURE (OUTPATIENT)
Dept: VASCULAR ULTRASOUND | Facility: CLINIC | Age: 37
End: 2022-06-01
Attending: SURGERY
Payer: COMMERCIAL

## 2022-06-01 DIAGNOSIS — N18.9 CHRONIC KIDNEY DISEASE, UNSPECIFIED CKD STAGE: ICD-10-CM

## 2022-06-01 PROCEDURE — 93990 DOPPLER FLOW TESTING: CPT | Mod: 26 | Performed by: SURGERY

## 2022-06-01 PROCEDURE — 93990 DOPPLER FLOW TESTING: CPT

## 2022-06-14 ENCOUNTER — OFFICE VISIT (OUTPATIENT)
Dept: VASCULAR SURGERY | Facility: CLINIC | Age: 37
End: 2022-06-14
Payer: COMMERCIAL

## 2022-06-14 VITALS — SYSTOLIC BLOOD PRESSURE: 160 MMHG | HEART RATE: 83 BPM | TEMPERATURE: 98.4 F | DIASTOLIC BLOOD PRESSURE: 83 MMHG

## 2022-06-14 DIAGNOSIS — N18.4 CHRONIC KIDNEY DISEASE, STAGE IV (SEVERE) (H): ICD-10-CM

## 2022-06-14 DIAGNOSIS — N18.9 CHRONIC KIDNEY DISEASE, UNSPECIFIED CKD STAGE: Primary | ICD-10-CM

## 2022-06-14 PROCEDURE — 99024 POSTOP FOLLOW-UP VISIT: CPT | Performed by: SURGERY

## 2022-06-14 RX ORDER — VITAMIN B COMPLEX
TABLET ORAL DAILY
COMMUNITY
End: 2022-09-22

## 2022-06-14 NOTE — PATIENT INSTRUCTIONS
Jung Hernandez ,    Thank you for entrusting your care with us today. After your visit today with Dr Farrukh Joya this is the plan that was discussed at your appointment.    You can start using your fistula at dialysis now.     2.  Please follow up in 6 months with Jessica with ultrasound.         I am including additional information on these things and our contact information if you have any questions or concerns.   Please do not hesitate to reach out to us if you felt we did not answer your questions or you are unsure of the treatment plan after your visit today. Our number is [phone].Thank you for trusting us with your care.         Again thank you for your time.     Rhoda Hernandez RN

## 2022-06-14 NOTE — PROGRESS NOTES
Patient is in clinic today to see MD Crystal Joya.      Patient is here for a post op to discuss s/p left AVF 4/13/22..    The patient does not smoke.    Pt is currently taking ASA.    The patient states he/she are NOT wearing compression .    Swelling is observed in N/A.    Pt rates pain 0/10 located at .    Pts symptoms include none in  extremity.    Patients condition is stable.    The provider has been notified that the patient has no complaints.

## 2022-06-14 NOTE — NURSING NOTE
Initial BP (!) 160/83 (BP Location: Left arm, Patient Position: Sitting, Cuff Size: Adult Large)   Pulse 83   Temp 98.4  F (36.9  C) (Tympanic)  Estimated body mass index is 35.52 kg/m  as calculated from the following:    Height as of 4/23/22: 1.524 m (5').    Weight as of 4/30/22: 82.5 kg (181 lb 14.4 oz). .    Serene Javed LPN on 6/14/2022 at 1:45 PM

## 2022-06-19 NOTE — PROGRESS NOTES
VASCULAR SURGERY PROGRESS NOTE   VASCULAR SURGEON: Sobeida Joya MD, RPVI     LOCATION:  [facility]     Alysia Hernandez   Medical Record #:  [id number]  YOB: 1985  Age:  37 year old     Date of Service: 6/14/2022    PRIMARY CARE PROVIDER: No Ref-Primary, Physician      Reason for visit: Follow-up    IMPRESSION: 37-year-old female who comes to vascular surgery clinic for follow-up.  Patient status post left brachiocephalic fistula creation.  Ultrasound did show grade patency of the fistula.  Concern for depth of the cephalic vein that could potentially create a challenge for an access.    RECOMMENDATION/RISKS:  Okay to start using fistula.  If there is a challenge we might consider possible transposition/translocation of the fistula.    HPI:  Alysia Hernandez is a 37 year old female who was seen today i for follow-up.  Patient doing well in regards and denies any complaints.    REVIEW OF SYSTEMS:    A 12 point ROS was reviewed and is negative     PHH:    Past Medical History:   Diagnosis Date     Acute respiratory failure with hypoxia (H) 04/10/2022     Chronic kidney disease, stage V (H)      Diabetes (H) 02/09/2020     Diabetes mellitus (H)      Fluid overload 04/10/2022     HTN (hypertension) 09/14/2015     Hypertensive retinopathy of both eyes 04/19/2013     Nephrotic range proteinuria 06/11/2020     Obesity (BMI 30.0-34.9) 02/09/2020     Renal failure, unspecified chronicity 02/09/2020     Smoking 05/04/2016          Past Surgical History:   Procedure Laterality Date     CREATE FISTULA ARTERIOVENOUS UPPER EXTREMITY Left 4/13/2022    Procedure: CREATION, ARTERIOVENOUS FISTULA,LEFT  UPPER EXTREMITY;THROMBELECTOMY LEFT CEPHALIC VEIN.;  Surgeon: Sobeida Joya MD;  Location: [facility] OR     IR CVC NON TUNNEL LINE REMOVAL  4/28/2022     IR CVC NON TUNNEL PLACEMENT  4/26/2022     IR CVC TUNNEL PLACEMENT > 5 YRS OF AGE  4/29/2022       ALLERGIES:  Patient has no known  allergies.    MEDS:    Current Outpatient Medications:      amLODIPine (NORVASC) 10 MG tablet, Take 1 tablet (10 mg) by mouth daily, Disp: 90 tablet, Rfl: 3     aspirin (ASA) 81 MG EC tablet, Take 1 tablet (81 mg) by mouth daily, Disp: 90 tablet, Rfl: 3     Blood Glucose Monitoring Suppl (CONTOUR BLOOD GLUCOSE SYSTEM) w/Device KIT, 1 Device by Percutaneous route daily, Disp: 1 kit, Rfl: 0     calcitRIOL (ROCALTROL) 0.25 MCG capsule, Take 1 capsule (0.25 mcg) by mouth three times a week (Patient taking differently: Take 0.25 mcg by mouth three times a week Mon-Wed-Fri), Disp: 30 capsule, Rfl: 1     carvedilol (COREG) 25 MG tablet, Take 1 tablet (25 mg) by mouth 2 times daily (with meals), Disp: 180 tablet, Rfl: 1     Continuous Blood Gluc Sensor (FREESTYLE HARINI 2 SENSOR) [facility] [place], 1 Application every 14 days, Disp: 2 each, Rfl: 3     Glucose Blood (MARIA DEL CARMEN CONTOUR TEST) strip, Use to test blood sugars 2 times daily or as directed., Disp: 100 strip, Rfl: prn     Vitamin D3 (CHOLECALCIFEROL) 25 mcg (1000 units) tablet, Take by mouth daily, Disp: , Rfl:     SOCIAL HABITS:    History   Smoking Status     Former Smoker     Types: Cigarettes     Quit date: 4/12/2022   Smokeless Tobacco     Never Used     Comment: currently on E -cig. -- 11/8/13     Social History    Substance and Sexual Activity      Alcohol use: No      History   Drug Use No       FAMILY HISTORY:    Family History   Problem Relation Age of Onset     Diabetes Mother         50     Cerebrovascular Disease Mother      Hyperlipidemia Father      No Known Problems Sister      No Known Problems Brother      Cerebrovascular Disease Paternal Aunt        PE:  BP (!) 160/83 (BP Location: Left arm, Patient Position: Sitting, Cuff Size: Adult Large)   Pulse 83   Temp 98.4  F (36.9  C) (Tympanic)   Wt Readings from Last 1 Encounters:   04/30/22 82.5 kg (181 lb 14.4 oz)     There is no height or weight on file to calculate BMI.    EXAM:  GENERAL: This is a well-developed  37 year old female who appears her stated age  EYES: Grossly normal.  MOUTH: Buccal mucosa normal   CARDIAC: Normal   CHEST/LUNG: Clear to auscultation bilaterally  GASTROINTESINAL soft nontender nondistended  MUSCULOSKELETAL: Grossly normal and both lower extremities are intact.  HEME/LYMPH: No lymphedema  NEUROLOGIC: Focally intact, Alert and oriented x 3.   PSYCH: appropriate affect  INTEGUMENT: No open lesions or ulcers  Pulse Exam:           DIAGNOSTIC STUDIES:     Images:  US Ext Arterial Venous Dialys Acs Graft    Result Date: 6/2/2022  LEFT ARM HEMODIALYSIS FISTULA ULTRASOUND (Date: 06/01/22) Indication:  SURVEILLANCE LEFT BRACHIAL ARTERY TO CEPHALIC VEIN FISTULA DONE 4/13/2022.   Access Description:  Left arm Previous: NONE  Technique: Color Doppler and spectral waveform analysis performed at the inflow native artery and outflow native vein. Location Description Dimensions AP X Width (cm) Blood Flow Volume (cc/min) Velocities (cm/sec) Waveform Pattern Inflow Native Artery Distal Brachial  1590  339   Monophasic Arterial/ Venous Anastamosis   970  585 Monophasic Outflow Native Vein Distal Cephalic 0.76 X 1.11  2197   Monophasic Outflow Native Vein Mid Cephalic 0.73 X 0.71  650  Monophasic Outflow Native Vein Proximal Cephalic 0.65 X 0.77  1868  Monophasic Subclavian Vein     Monophasic Radial Art.  At Wrist    67 Triphasic Ulnar Art.  At Wrist    64 Triphasic  Comments: LEFT MID CEPHALIC VEIN BRANCH (3.3 mm/ 361  cc/ min / 306 cm/s); LEFT DISTAL CEPHALIC VEIN BRANCH ( 2.5 mm/ 72 ml / min / 194 cm/s) Impression: Patent left brachiocephalic fistula with acceptable flow.      I personally reviewed the images and my interpretation is rate patency of the left brachiocephalic fistula.    LABS:      Sodium   Date Value Ref Range Status   04/25/2022 134 (L) 136 - 145 mmol/L Final   04/24/2022 133 (L) 136 - 145 mmol/L Final   04/23/2022 132 (L) 136 - 145 mmol/L Final   11/08/2013 136.0 133.0 - 144.0 mmol/L Final    09/26/2012 135.0 133.0 - 144.0 mmol/L Final     Urea Nitrogen   Date Value Ref Range Status   04/25/2022 73 (H) 8 - 22 mg/dL Final   04/24/2022 71 (H) 8 - 22 mg/dL Final   04/23/2022 71 (H) 8 - 22 mg/dL Final   11/08/2013 7.0 5.0 - 24.0 mg/dL Final   09/26/2012 12.0 5.0 - 24.0 mg/dL Final     Hemoglobin   Date Value Ref Range Status   04/28/2022 7.6 (L) 11.7 - 15.7 g/dL Final   04/24/2022 8.5 (L) 11.7 - 15.7 g/dL Final   04/23/2022 9.9 (L) 11.7 - 15.7 g/dL Final     Platelet Count   Date Value Ref Range Status   04/28/2022 150 150 - 450 10e3/uL Final   04/24/2022 262 150 - 450 10e3/uL Final   04/23/2022 303 150 - 450 10e3/uL Final     BNP   Date Value Ref Range Status   04/23/2022 419 (H) 0 - 64 pg/mL Final       30 minutes spent on the day of encounter doing chart review, history and exam, documentation, and further activities as noted.       Sobeida Joya MD, [facility]  VASCULAR SURGERY

## 2022-07-21 ENCOUNTER — TELEPHONE (OUTPATIENT)
Dept: VASCULAR SURGERY | Facility: CLINIC | Age: 37
End: 2022-07-21

## 2022-07-21 NOTE — TELEPHONE ENCOUNTER
Patient states that she needs to schedule another procedure with Dr. Joya for her dialysis access as her vein is too deep? Please call patient to discuss.

## 2022-07-25 ENCOUNTER — TELEPHONE (OUTPATIENT)
Dept: VASCULAR SURGERY | Facility: CLINIC | Age: 37
End: 2022-07-25

## 2022-07-25 ENCOUNTER — OFFICE VISIT (OUTPATIENT)
Dept: VASCULAR SURGERY | Facility: CLINIC | Age: 37
End: 2022-07-25
Attending: SURGERY
Payer: COMMERCIAL

## 2022-07-25 VITALS — HEART RATE: 88 BPM | DIASTOLIC BLOOD PRESSURE: 86 MMHG | SYSTOLIC BLOOD PRESSURE: 142 MMHG

## 2022-07-25 DIAGNOSIS — N18.9 CHRONIC KIDNEY DISEASE, UNSPECIFIED CKD STAGE: Primary | ICD-10-CM

## 2022-07-25 PROCEDURE — 99213 OFFICE O/P EST LOW 20 MIN: CPT | Performed by: SURGERY

## 2022-07-25 PROCEDURE — G0463 HOSPITAL OUTPT CLINIC VISIT: HCPCS

## 2022-07-25 ASSESSMENT — PAIN SCALES - GENERAL: PAINLEVEL: NO PAIN (0)

## 2022-07-25 NOTE — PROGRESS NOTES
VASCULAR SURGERY PROGRESS NOTE   VASCULAR SURGEON: Sobeida Joya MD, RPVI     LOCATION:  [facility]     Alysia Hernandez   Medical Record #:  [id number]  YOB: 1985  Age:  37 year old     Date of Service: 7/25/2022    PRIMARY CARE PROVIDER: No Ref-Primary, Physician      Reason for visit: Follow-up    IMPRESSION: 37-year-old female who comes to vascular surgery clinic for follow-up.  Patient has no history of left brachiocephalic fistula creation.  Most recent ultrasound did show good flow through the fistula with significant branches at the level of the outflow.  Couple trials of dialysis were made but unfortunately were unsuccessful.  Per dialysis nurse there was concern for a fistula being too deep.    RECOMMENDATION/RISKS: Would recommend starting with fistulogram to look for possible outflow stenosis.  Would recommend embolization of branches if possible.  Follow-up 2 to 3 weeks after fistulogram is done    HPI:  Alysia Hernandez is a 37 year old female who was seen today for follow-up.  Patient is doing well in all regards to denies any significant complaints    REVIEW OF SYSTEMS:    A 12 point ROS was reviewed and is negative     PHH:    Past Medical History:   Diagnosis Date     Acute respiratory failure with hypoxia (H) 04/10/2022     Chronic kidney disease, stage V (H)      Diabetes (H) 02/09/2020     Diabetes mellitus (H)      Fluid overload 04/10/2022     HTN (hypertension) 09/14/2015     Hypertensive retinopathy of both eyes 04/19/2013     Nephrotic range proteinuria 06/11/2020     Obesity (BMI 30.0-34.9) 02/09/2020     Renal failure, unspecified chronicity 02/09/2020     Smoking 05/04/2016          Past Surgical History:   Procedure Laterality Date     CREATE FISTULA ARTERIOVENOUS UPPER EXTREMITY Left 4/13/2022    Procedure: CREATION, ARTERIOVENOUS FISTULA,LEFT  UPPER EXTREMITY;THROMBELECTOMY LEFT CEPHALIC VEIN.;  Surgeon: Sobeida Joya MD;  Location:   [facility] OR     IR CVC NON TUNNEL LINE REMOVAL  4/28/2022     IR CVC NON TUNNEL PLACEMENT  4/26/2022     IR CVC TUNNEL PLACEMENT > 5 YRS OF AGE  4/29/2022       ALLERGIES:  Patient has no known allergies.    MEDS:    Current Outpatient Medications:      amLODIPine (NORVASC) 10 MG tablet, Take 1 tablet (10 mg) by mouth daily, Disp: 90 tablet, Rfl: 3     aspirin (ASA) 81 MG EC tablet, Take 1 tablet (81 mg) by mouth daily, Disp: 90 tablet, Rfl: 3     Blood Glucose Monitoring Suppl (CONTOUR BLOOD GLUCOSE SYSTEM) w/Device KIT, 1 Device by Percutaneous route daily, Disp: 1 kit, Rfl: 0     calcitRIOL (ROCALTROL) 0.25 MCG capsule, Take 1 capsule (0.25 mcg) by mouth three times a week (Patient taking differently: Take 0.25 mcg by mouth three times a week Mon-Wed-Fri), Disp: 30 capsule, Rfl: 1     carvedilol (COREG) 25 MG tablet, Take 1 tablet (25 mg) by mouth 2 times daily (with meals), Disp: 180 tablet, Rfl: 1     Continuous Blood Gluc Sensor (FREESTYLE HARINI 2 SENSOR) [facility] 1 Application every 14 days, Disp: 2 each, Rfl: 3     Glucose Blood (MARIA DEL CARMEN CONTOUR TEST) strip, Use to test blood sugars 2 times daily or as directed., Disp: 100 strip, Rfl: prn     Vitamin D3 (CHOLECALCIFEROL) 25 mcg (1000 units) tablet, Take by mouth daily, Disp: , Rfl:     SOCIAL HABITS:    History   Smoking Status     Current Every Day Smoker     Packs/day: 0.25     Types: Cigarettes   Smokeless Tobacco     Never Used     Comment: currently on E -cig. -- 11/8/13     Social History    Substance and Sexual Activity      Alcohol use: No      History   Drug Use No       FAMILY HISTORY:    Family History   Problem Relation Age of Onset     Diabetes Mother         50     Cerebrovascular Disease Mother      Hyperlipidemia Father      No Known Problems Sister      No Known Problems Brother      Cerebrovascular Disease Paternal Aunt        PE:  BP (!) 142/86   Pulse 88   Wt Readings from Last 1 Encounters:   04/30/22 82.5 kg (181 lb 14.4 oz)     There is  no height or weight on file to calculate BMI.    EXAM:  GENERAL: This is a well-developed 37 year old female who appears her stated age  EYES: Grossly normal.  MOUTH: Buccal mucosa normal   CARDIAC: Normal   CHEST/LUNG: Clear to auscultation bilaterally  GASTROINTESINAL soft nontender nondistended  MUSCULOSKELETAL: Grossly normal and both lower extremities are intact.  HEME/LYMPH: No lymphedema  NEUROLOGIC: Focally intact, Alert and oriented x 3.   PSYCH: appropriate affect  INTEGUMENT: No open lesions or ulcers  Pulse Exam: Palpable thrill is noted over the proximal fistula.  Unfortunately fades out distally          DIAGNOSTIC STUDIES:     Images:  No results found.        LABS:      Sodium   Date Value Ref Range Status   04/25/2022 134 (L) 136 - 145 mmol/L Final   04/24/2022 133 (L) 136 - 145 mmol/L Final   04/23/2022 132 (L) 136 - 145 mmol/L Final   11/08/2013 136.0 133.0 - 144.0 mmol/L Final   09/26/2012 135.0 133.0 - 144.0 mmol/L Final     Urea Nitrogen   Date Value Ref Range Status   04/25/2022 73 (H) 8 - 22 mg/dL Final   04/24/2022 71 (H) 8 - 22 mg/dL Final   04/23/2022 71 (H) 8 - 22 mg/dL Final   11/08/2013 7.0 5.0 - 24.0 mg/dL Final   09/26/2012 12.0 5.0 - 24.0 mg/dL Final     Hemoglobin   Date Value Ref Range Status   04/28/2022 7.6 (L) 11.7 - 15.7 g/dL Final   04/24/2022 8.5 (L) 11.7 - 15.7 g/dL Final   04/23/2022 9.9 (L) 11.7 - 15.7 g/dL Final     Platelet Count   Date Value Ref Range Status   04/28/2022 150 150 - 450 10e3/uL Final   04/24/2022 262 150 - 450 10e3/uL Final   04/23/2022 303 150 - 450 10e3/uL Final     BNP   Date Value Ref Range Status   04/23/2022 419 (H) 0 - 64 pg/mL Final       30 minutes spent on the day of encounter doing chart review, history and exam, documentation, and further activities as noted.         Sobeida Joya MD, [facility]  VASCULAR SURGERY

## 2022-07-25 NOTE — NURSING NOTE
[facility]        Patient is here for a visit to discuss dialysis having trouble accessing for Diaylsis    Pt is currently taking Aspirin and Statin.    BP (!) 142/86   Pulse 88     The provider has been notified that the patient has no concerns.     Questions patient would like addressed today are: N/A.    Refills are needed: No    Has homecare services and agency name:  Dayami Dillon LPN

## 2022-07-25 NOTE — PATIENT INSTRUCTIONS
Alysia Hernandez,    Your visit to [facility] for your procedure is coming soon and we look forward to seeing you! This friendly reminder and pre-procedure checklist will help to ensure your procedure goes smoothly and meets your expectations. At [facility], our goal is to provide you with a great patient experience and to deliver genuine, professional care to every patient.     Please complete all the steps in advance of your visit. If you have any questions about the items listed below, please give our office a call. We can be reached at [phone] or visit our website at [email or web address] for more information.     Procedure: Left Arm Fistulogram    Procedure Date :  TBD    Procedure Time :  TBD    Arrival Time: TBD    Admission Type: Outpatient    Procedure Location: [facility]:  [address] [place] [address] (phone: [phone], Fax: [phone])    Surgeon: MD Syd Pierce    COVID PCR Test: Take an at home test 1-2 days before procedure and bring a photo of the negative test with you    2 week Post Procedure Appointment with Dr Farrukh Joya and also ultrasound: TBD    If you take blood thinners: SEE SPECIFIC INSTRUCTIONS BELOW    PLEASE DO NOT STOP YOUR ASPIRIN OR PLAVIX UNLESS SPECIFICALLY DIRECTED BY THE VASCULAR SURGEON TO STOP!  In most cases Vascular providers want you to continue these. This is different from most NON vascular surgeries and may not be well known by your Primary Care Provider  Aspirin:   PLEASE DO NOT STOP THIS MEDICATION PRIOR TO SURGERY/ PROCEDURE.       Prepare for the peripheral angiography as follows:  Do not eat 8 hours before your procedure. You may have clear liquids up to 2 hours before surgery.  If your provider says to take your normal medicines, swallow them with only small sips of water.  Tell your healthcare provider about all medicines you take and any  allergies you may have.  Arrange for a family member or friend to drive you home.  If you are on insulin, please take only 1/2 the dose the night before and HOLD the day of the procedure.   If you are on Metformin, please HOLD for 48 hours after the procedure.     Peripheral Angiography    Peripheral angiography is an outpatient procedure that makes a  map  of the vessels (arteries) in your lower body, legs, and arms, using X-ray and dye.This map can show where blood flow may be blocked.    An angiogram is commonly performed under sedation with the use of local anesthesia.    The procedure usually starts with a needle put into the femoral (groin) artery. From one treatment site, areas all over the body can be treated.  After access is established, catheters (thin tubes) and wires are threaded through the arterial system to a specific area of interest or throughout the entire body.  As a contrast agent (iodine dye) is injected, X-ray images are taken to let your provider view the flow of the dye and identify blockages. The surgeon can then choose the best mode of therapy for you - whether during or following the angiogram. This decision depends on your symptoms and the severity and characteristics of the blockages.  Two common therapies that can be provided during the angiogram are balloon angioplasty and stent placement.                   Angioplasty can be used to open arterial blockages. Guided by X-ray, your provider navigates through the blockage with a wire and introduces a special device equipped with an inflatable balloon. After positioning the balloon device across the blocked portion of the artery, the provider inflates the balloon to expand the artery and compress the blockage. The balloon is then deflated and removed while keeping the wire in place across the area that has been treated. Next, contrast dye is injected to assess the result. Treatment is considered a success if blood flow is improved and  less than 30% of the blockage remains. If the vessel is still considerably narrowed, placing a stent may be the next step.    Stents are used to prop open an artery at the site of a narrowing. Stents are generally placed after balloon angioplasty when there is residual narrowing or insufficient blood flow in a treated vessel. Stents are considered a permanent implant and cannot be used if you have a metal allergy. Stents that are used in the leg are constructed of a nickel-titanium alloy (Nitinol), a memory-shaped metal. This alloy has a predetermined size and shape at body temperature and expands to this size and shape after being introduced through a catheter. These stents resist kinking and are flexible so that damage from activities that involve your legs is minimized.  Your procedure may require other techniques to address the problem or plaque.     If surgery is felt to be a better option, your vascular provider will obtain any additional X-ray images needed to plan a surgical bypass of the blocked vessel/s and will then conclude the angiogram.      During the procedure  Here is what to expect:  You may get medicine through an IV (intravenous) line to relax you. You re given an injection to numb the insertion site. Then, a tiny skin cut (incision) is made near an artery in your groin.  Your provider inserts a thin tube (catheter) through the incision. He or she then threads the catheter into an artery while looking at a video monitor.  Contrast  dye  is injected into the catheter to confirm position. You may feel warmth or pressure in your legs and back. You lie still as X-rays are taken. The catheter is then taken out.  After the procedure  You ll be taken to a recovery area. A healthcare provider will apply pressure to the site for about 10 minutes. Your healthcare provider will tell you how long to lie down and keep the insertion site still. Your healthcare provider will discuss the results with you soon  after the procedure.      Angiogram Procedure Discharge Instructions:     1. If you received sedation for your procedure: Do not drive or operate heavy machinery for the rest of the day.  2. Avoid strenuous activity for 72 hours (3 days):                        - Do not lift greater than 10 pounds.                        - Excessive exercise                        - Straining                        - Return to your normal activities as you tolerate after the 3 days restriction  3. Avoid tub baths, Jacuzzis, hot tubs and pools for 72 hours (3 days) or until puncture site is will healed.  4. You may shower beginning tomorrow. Do not scrub puncture site(s) until well healed, pat dry.  5. You can expect to return to work 1-2 days after your procedure - depending on the nature of your profession.  6. It is normal to have some tenderness and minimal swelling at puncture site. A small area of discoloration may be present. Tenderness typically subsides in 24-48 hours. A small knot may also be present at puncture site for 6-8 weeks, this can be a normal part of the healing process.       After the angiogram If you:      1. Experience any bleeding or active swelling from puncture site: Lie down, firmly apply pressure to puncture site and CALL 9-1-1  2. Fever greater than 101 degrees Fahrenheit.  3. Redness, swelling, warmth to touch, or purulent (yellow/green/foul smelling) drainage from the puncture site.  4. Increasing pain, tenderness or swelling at puncture site OR of arm/leg near puncture site.  5. Feeling weak or faint.  6. Change in color, temperature, or sensation of arm/leg where puncture was made.  7. You can t feel your thrill (pulse at your dialysis fistula site) or it feels weak (If you had fistulogram done).     Call us with any other questions or concerns after your procedure: [phone]      All invasive procedures can have complications. While the risk of an angiogram is low it is not zero. The most common  "complications are related to the arterial access site.       Risks/ Complications   Bruising is Common  You will likely have bruising (ecchymosis) where the artery was entered.    Pain and Bleeding  Less commonly, patients experience pain and bleeding that may include blood collecting under the skin (hematoma).    Blockage and Leakage   In rare cases, the access artery can become blocked. Infrequently, patients experience persistent leakage of blood where the artery was entered, which can result in the formation of a pseudoaneurysm--a blood-filled sac--that may require further treatment.  Other complications related to an angiogram include:   Allergic reaction to the iodine contrast dye, which can lead to the development of kidney failure.  Very rarely during balloon angioplasty and/or stent placement, part of the arterial blockage can break off (embolism) and travel to more distant arteries. This can worsen blood flow.        Notify our office right away, if you have any changes in your health status, or if you develop a cold, flu, diarrhea, infection, fever or sore throat before your scheduled surgery date. We can be reached at [phone] Monday-Friday 8 am-4:30 pm if you have any questions.   Thank you for choosing [facility]      [] PCR COVID-19 test is required within 4 days of surgery  If your test is positive or you fail to get tested, your surgery will be postponed.     [] Contact your insurance regarding coverage  If you would like a Good Rajni Estimate for your upcoming procedure at [facility], contact Cost of Care Estimates   Advocates are available Monday through Friday 8am - 5pm   [phone]  You may also submit a request online at [email or web address]  - Complete the secure online form found under \"Services and Procedure Pricing\"   If your procedure is at [facility] please contact the numbers below for Cost of Care Estimates.   - Facility " Charge: [phone]    Anesthesiology charge:  [phone]      [] DO NOT BRING FMLA WITH TO SURGERY.  These should be sent to the provider's office by fax to [phone].     [] Day of Surgery  Medications - Take as indicated with sips of water.   Wear comfortable loose fitting clothes. Wear your glasses-Not contacts. Do not wear jewelry and remove body piercing's. Surgery may be cancelled if they are not removed.   You may have 1 family member wait in the lobby during your surgery. Visitor restrictions are subject to change. Please verify with the surgery nurse when they call.   If same day surgery-Have a someone come with you to surgery that can help you understand the surgeon's instructions, drive you home and stay with you overnight the first night.    [] If the community sees a new COVID-19 surge, your procedure may need to be postponed. We will contact you if this happens.    [] You will receive a call from a surgery nurse 1-3 days prior to surgery. They will go over more details with you.       Before Your Procedure or Hospital Admission Testing for COVID-19  Thank you for choosing [facility] for your health care needs. The COVID-19 pandemic is a very challenging time for everyone.   Our goal is to keep you and our team here at [facility] safe and healthy. We ve taken many steps to make this happen.   For example:   We test and screen our staff, care teams and patients for COVID-19.   Everyone at [facility] must wear a mask and stay 6 feet apart.   We are limiting hospital and clinic visitors.  Before you come in  You must get tested for COVID-19, even if you ve been vaccinated.   If you re going home on the same day as your procedure (surgery):  1 to 2 days before your procedure, take an at-home, rapid antigen test. You can buy these at many pharmacy stores. Or you can order free, at-home tests at covid.gov/tests. If you   can t find an at-home, rapid antigen test, please schedule  a PCR test with  [employer] by calling [phone], or visiting [email or web address]. We   can t accept tests that are more than 4 days old.   If your test is negative, please take a photo of the test. Show the photo to the nurse when you come in for your procedure.  If your test is positive, please see the  If your test shows you have COVID-19  section on this page.    If you re staying overnight in the hospital:  4 days before your procedure, please get a PCR test from a lab.   To schedule a PCR test with  [facility], call [phone]. Or, visit [email or web address].    If your test is negative, please ask the testing location to fax your results to us at [phone].  If your test is positive, please see the  If your test shows you have COVID-19  section below.    After your test and before your procedure, please follow these safety guidelines:   Limit trips outside your home.   Limit the number of people you see.   Always wear a face covering outside your home.   Use social distancing. Stay 6 feet away from others whenever you can.   Wash your hands often.    If your test shows you have COVID-19  If your test is positive, please tell your surgeon s office right away. A positive test means that you have COVID-19.  We ll probably have to postpone your admission, surgery or procedure. Your care team will discuss this with you. After that, we ll let you know what to   do and when you can re-schedule.      If your test shows you DON T have COVID-19.   Even if your test is negative, you can still get COVID-19. It s rare, but sometimes the test result is wrong. You could also catch the virus after taking   the test. There s a very small chance that you could catch COVID-19 in the hospital or surgery center. [facility] has taken many steps to prevent this from happening.   Possible surgery delay like you, we want your surgery to happen when it s scheduled. But  sometimes the hospital is so full that it s not safe for you to have your surgery. This is   especially true during the pandemic. Your surgery may need to be re-scheduled at a later date. If this happens, we will call and tell you.   Day of your surgery or procedure   Please come wearing a face covering that covers both your nose and mouth.   When you arrive, we ll ask you some questions to find out if you ve had any exposures to COVID-19, or have any signs of COVID-19.   No matter where you took a test, we ll need to have the results. You can ask your testing location to fax the results to us at [phone]. If you   took a rapid antigen at-home test, you can bring a photo of the results.    Ask your care team if you can have visitors. All visitors must wear face coverings and will be screened for exposure to, or signs of, COVID-19.    The rules for visitors change often, depending on how much the virus is spreading. To learn more, see Visiting a Loved One in the Hospital during   the COVID-19 Outbreak.Please call your care team, hospital or surgery center if you have any questions. We thank you for your understanding and for choosing [facility]   for your care.   Questions and answers  Does it matter how I get tested for COVID-19?   Yes. If the plan is for you to go home on the same day as your procedure, you can take a rapid antigen, at-home test 1 to 2 days before you come in. If your test is negative, please take a photo of your test. Show it to the nurse when you come to the hospital. If you can t find a rapid antigen, at-home test, you can take a PCR test at a lab instead. If the plan is for you to stay in the hospital after your surgery, you ll need to get a PCR test from a lab 4 days before your procedure. Ask the testing location to fax your results to [phone]. If we don t get your results, we may have to delay or cancel your treatment.  Do I need to get tested at [facility]?  No.  However, if you need a PCR test from a lab, we recommend using an [facility] lab. We ll get the results more quickly and easily that way. To schedule a test, please call [phone]. Or, visit [email or web address]      For informational purposes only. Not to replace the advice of your health care provider. Copyright   2020 [facility]. All rights reserved. Clinically reviewed by Infection Prevention and the [facility] COVID-19 Clinical Team.   [employer] 561354 - Rev 05/26/22.

## 2022-07-25 NOTE — TELEPHONE ENCOUNTER
Left message for patient that fistulogram was scheduled for 8/11/22 at 8 am at [place]. Follow up appointments made. Went over other instructions with patient in clinic.

## 2022-08-03 ENCOUNTER — TELEPHONE (OUTPATIENT)
Dept: VASCULAR SURGERY | Facility: CLINIC | Age: 37
End: 2022-08-03

## 2022-08-04 NOTE — IR NOTE
IR Procedure Pre-call    Spoke with: Alysia  Arrival and procedure time: 0900/1000  Patient has : Yes  Patient has covid test: Pt will do home rapid test  Patient NPO 8 hours prior: Yes    Pre-call completed.   August 4, 2022 2:45 PM  Kelley Carrasquillo RN

## 2022-08-10 RX ORDER — HEPARIN SODIUM 200 [USP'U]/100ML
1 INJECTION, SOLUTION INTRAVENOUS CONTINUOUS PRN
Status: DISCONTINUED | OUTPATIENT
Start: 2022-08-10 | End: 2022-08-12 | Stop reason: HOSPADM

## 2022-08-11 ENCOUNTER — HOSPITAL ENCOUNTER (OUTPATIENT)
Dept: INTERVENTIONAL RADIOLOGY/VASCULAR | Facility: HOSPITAL | Age: 37
Discharge: HOME | End: 2022-08-11
Attending: SURGERY | Admitting: RADIOLOGY
Payer: COMMERCIAL

## 2022-08-11 VITALS
RESPIRATION RATE: 16 BRPM | BODY MASS INDEX: 35.53 KG/M2 | DIASTOLIC BLOOD PRESSURE: 72 MMHG | HEIGHT: 60 IN | TEMPERATURE: 97.9 F | WEIGHT: 181 LBS | OXYGEN SATURATION: 100 % | SYSTOLIC BLOOD PRESSURE: 152 MMHG | HEART RATE: 77 BPM

## 2022-08-11 DIAGNOSIS — N18.9 CHRONIC KIDNEY DISEASE, UNSPECIFIED CKD STAGE: ICD-10-CM

## 2022-08-11 PROCEDURE — 272N000302 HC DEVICE INFLATION CR5

## 2022-08-11 PROCEDURE — 272N000188 HC ACCESSORY CR12

## 2022-08-11 PROCEDURE — 272N000566 HC SHEATH CR3

## 2022-08-11 PROCEDURE — 272N000500 HC NEEDLE CR2

## 2022-08-11 PROCEDURE — 36902 INTRO CATH DIALYSIS CIRCUIT: CPT

## 2022-08-11 PROCEDURE — C1769 GUIDE WIRE: HCPCS

## 2022-08-11 PROCEDURE — 272N000637 HC COIL/EMBOLIC DEVICE CR20

## 2022-08-11 PROCEDURE — 76937 US GUIDE VASCULAR ACCESS: CPT

## 2022-08-11 PROCEDURE — 255N000002 HC RX 255 OP 636: Performed by: RADIOLOGY

## 2022-08-11 PROCEDURE — C1887 CATHETER, GUIDING: HCPCS

## 2022-08-11 PROCEDURE — 272N000117 HC CATH CR2

## 2022-08-11 PROCEDURE — 250N000009 HC RX 250: Performed by: NURSE PRACTITIONER

## 2022-08-11 PROCEDURE — 99152 MOD SED SAME PHYS/QHP 5/>YRS: CPT

## 2022-08-11 PROCEDURE — 272N000119 HC CATH CR4

## 2022-08-11 PROCEDURE — C1725 CATH, TRANSLUMIN NON-LASER: HCPCS

## 2022-08-11 PROCEDURE — 250N000011 HC RX IP 250 OP 636: Performed by: NURSE PRACTITIONER

## 2022-08-11 RX ORDER — IODIXANOL 320 MG/ML
100 INJECTION, SOLUTION INTRAVASCULAR ONCE
Status: COMPLETED | OUTPATIENT
Start: 2022-08-11 | End: 2022-08-11

## 2022-08-11 RX ORDER — NALOXONE HYDROCHLORIDE 0.4 MG/ML
0.4 INJECTION, SOLUTION INTRAMUSCULAR; INTRAVENOUS; SUBCUTANEOUS
Status: DISCONTINUED | OUTPATIENT
Start: 2022-08-11 | End: 2022-08-12 | Stop reason: HOSPADM

## 2022-08-11 RX ORDER — NALOXONE HYDROCHLORIDE 0.4 MG/ML
0.2 INJECTION, SOLUTION INTRAMUSCULAR; INTRAVENOUS; SUBCUTANEOUS
Status: DISCONTINUED | OUTPATIENT
Start: 2022-08-11 | End: 2022-08-12 | Stop reason: HOSPADM

## 2022-08-11 RX ORDER — LIDOCAINE 40 MG/G
CREAM TOPICAL
Status: DISCONTINUED | OUTPATIENT
Start: 2022-08-11 | End: 2022-08-12 | Stop reason: HOSPADM

## 2022-08-11 RX ORDER — FENTANYL CITRATE 50 UG/ML
25-50 INJECTION, SOLUTION INTRAMUSCULAR; INTRAVENOUS EVERY 5 MIN PRN
Status: DISCONTINUED | OUTPATIENT
Start: 2022-08-11 | End: 2022-08-12 | Stop reason: HOSPADM

## 2022-08-11 RX ORDER — FLUMAZENIL 0.1 MG/ML
0.2 INJECTION, SOLUTION INTRAVENOUS
Status: DISCONTINUED | OUTPATIENT
Start: 2022-08-11 | End: 2022-08-12 | Stop reason: HOSPADM

## 2022-08-11 RX ORDER — ONDANSETRON 2 MG/ML
4 INJECTION INTRAMUSCULAR; INTRAVENOUS EVERY 6 HOURS PRN
Status: DISCONTINUED | OUTPATIENT
Start: 2022-08-11 | End: 2022-08-12 | Stop reason: HOSPADM

## 2022-08-11 RX ADMIN — MIDAZOLAM HYDROCHLORIDE 1 MG: 1 INJECTION, SOLUTION INTRAMUSCULAR; INTRAVENOUS at 10:33

## 2022-08-11 RX ADMIN — MIDAZOLAM HYDROCHLORIDE 1 MG: 1 INJECTION, SOLUTION INTRAMUSCULAR; INTRAVENOUS at 10:29

## 2022-08-11 RX ADMIN — FENTANYL CITRATE 50 MCG: 50 INJECTION, SOLUTION INTRAMUSCULAR; INTRAVENOUS at 10:46

## 2022-08-11 RX ADMIN — FENTANYL CITRATE 50 MCG: 50 INJECTION, SOLUTION INTRAMUSCULAR; INTRAVENOUS at 10:38

## 2022-08-11 RX ADMIN — FENTANYL CITRATE 50 MCG: 50 INJECTION, SOLUTION INTRAMUSCULAR; INTRAVENOUS at 11:02

## 2022-08-11 RX ADMIN — IODIXANOL 60 ML: 320 INJECTION, SOLUTION INTRAVASCULAR at 12:05

## 2022-08-11 RX ADMIN — LIDOCAINE HYDROCHLORIDE 20 ML: 10 INJECTION, SOLUTION INFILTRATION; PERINEURAL at 10:25

## 2022-08-11 RX ADMIN — MIDAZOLAM HYDROCHLORIDE 1 MG: 1 INJECTION, SOLUTION INTRAMUSCULAR; INTRAVENOUS at 10:42

## 2022-08-11 RX ADMIN — FENTANYL CITRATE 50 MCG: 50 INJECTION, SOLUTION INTRAMUSCULAR; INTRAVENOUS at 10:30

## 2022-08-11 RX ADMIN — MIDAZOLAM HYDROCHLORIDE 1 MG: 1 INJECTION, SOLUTION INTRAMUSCULAR; INTRAVENOUS at 11:28

## 2022-08-11 RX ADMIN — ONDANSETRON 4 MG: 2 INJECTION INTRAMUSCULAR; INTRAVENOUS at 10:26

## 2022-08-11 RX ADMIN — MIDAZOLAM HYDROCHLORIDE 1 MG: 1 INJECTION, SOLUTION INTRAMUSCULAR; INTRAVENOUS at 11:11

## 2022-08-11 RX ADMIN — MIDAZOLAM HYDROCHLORIDE 1 MG: 1 INJECTION, SOLUTION INTRAMUSCULAR; INTRAVENOUS at 10:54

## 2022-08-11 RX ADMIN — FENTANYL CITRATE 50 MCG: 50 INJECTION, SOLUTION INTRAMUSCULAR; INTRAVENOUS at 11:21

## 2022-08-11 NOTE — PRE-PROCEDURE
GENERAL PRE-PROCEDURE:   Procedure:  Fistulogram  Date/Time:  8/11/2022 9:43 AM    Written consent obtained?: Yes    Risks and benefits: Risks, benefits and alternatives were discussed    Consent given by:  Patient  Patient states understanding of procedure being performed: Yes    Patient's understanding of procedure matches consent: Yes    Procedure consent matches procedure scheduled: Yes    Expected level of sedation:  Moderate  Appropriately NPO:  Yes  ASA Class:  3  Mallampati  :  Grade 2- soft palate, base of uvula, tonsillar pillars, and portion of posterior pharyngeal wall visible  Lungs:  Lungs clear with good breath sounds bilaterally  Heart:  Normal heart sounds and rate  History & Physical reviewed:  History and physical reviewed and no updates needed  Statement of review:  I have reviewed the lab findings, diagnostic data, medications, and the plan for sedation

## 2022-08-11 NOTE — DISCHARGE INSTRUCTIONS
Fistulogram Discharge Instructions:  You had a fistulogram (evaluation of your fistula/graft). A puncture was made into your fistula/graft and your fistula/graft and blood vessels were evaluated for narrowing and clot. Please follow these instructions as you recover:    Care Instructions:   - If you received sedation for your procedure, do not drive or operate heavy machinery for the rest of the day.  - Resume your normal activities as you tolerate.  - Remove dressing tomorrow.   - You may shower tomorrow.     Follow Up:  - Follow up with your nephrologists/dialysis unit for your dialysis plan.    Please seek medical evaluation for:  - Uncontrolled bleeding from puncture site.   - Fever (greater than 101 F (38.3C)).  - Purulent (yellow/green/foul smelling) drainage from puncture site.   - Increasing pain at puncture site.  - Increasing redness at puncture site.   Call [facility] ([phone])*** with any questions or concerns.

## 2022-08-11 NOTE — PROCEDURES
[facility]    Procedure: Imaging Procedure Note    Date/Time: 8/11/2022 12:11 PM  Performed by: Syd Pierce MD  Authorized by: Syd Pierce MD       UNIVERSAL PROTOCOL   Site Marked: Yes  Prior Images Obtained and Reviewed:  Yes  Required items: Required blood products, implants, devices and special equipment available    Patient identity confirmed:  Verbally with patient  Patient was reevaluated immediately before administering moderate or deep sedation or anesthesia  Confirmation Checklist:  Patient's identity using two indicators, relevant allergies, procedure was appropriate and matched the consent or emergent situation and correct equipment/implants were available  Time out: Immediately prior to the procedure a time out was called    Universal Protocol: the Joint Commission Universal Protocol was followed    Preparation: Patient was prepped and draped in usual sterile fashion      SEDATION  Patient Sedated: Yes    Vital signs: Vital signs monitored during sedation    See dictated procedure note for full details.    PROCEDURE  Describe Procedure: LUE fistulogram  Patient Tolerance:  Patient tolerated the procedure well with no immediate complications  Length of time physician/provider present for 1:1 monitoring during sedation: 90

## 2022-08-11 NOTE — IP AVS SNAPSHOT
[facility]  [address] [place] [id number]  Phone: [phone]  Fax: [phone]                                      After Visit Summary   8/11/2022    Alysia Hernandez   MRN: [id number]           After Visit Summary Signature Page    I have received my discharge instructions, and my questions have been answered. I have discussed any challenges I see with this plan with the nurse or doctor.    ..........................................................................................................................................  Patient/Patient Representative Signature      ..........................................................................................................................................  Patient Representative Print Name and Relationship to Patient    ..................................................               ................................................  Date                                   Time    ..........................................................................................................................................  Reviewed by Signature/Title    ...................................................              ..............................................  Date                                               Time          22EPIC Rev 08/18

## 2022-08-11 NOTE — H&P
"  Interventional Radiology - History and Physical  8/11/2022    Procedure Requested: Fistulogram  Requesting Provider: Toan BROWN    HPI: Alysia Hernandez is a 37 year old female PMH HTN, DM2, and CKD 5     Dialysis MWF at [facility] via a tunneled HD catheter, placed per below.     Underwent surgical creation of a LEFT brachiocephalic AVF by Dr. Joya 4/13/2022.  Has attempted to utilize fistula on a couple of occasions, unsuccessful.  Per chart review, concern that fistula is \"too deep\".    Seen by Dr. Joya 7/25/2022.  Per chart review \"Would recommend starting with fistulogram to look for possible outflow stenosis.  Would recommend embolization of branches if possible.  Follow-up 2 to 3 weeks after fistulogram is done\"    Last dialysis 8/10/2022 (full run completed), next dialysis 8/12/2022.   Dialysis under the care of Dr. Martinez.    Last fistulogram: first fistulogram    Presents today for a fistulogram 2/2 concerns RE maturation, difficulty accessing, possible outflow stenosis      Imaging:   [place]     DATE: 4/29/2022 2:03 PM     PROCEDURE: TUNNELED CENTRAL VENOUS CATHETER PLACEMENT     INTERVENTIONAL RADIOLOGIST: Aden Boone MD     INDICATION: 37-year-old female need of dialysis     MODERATE SEDATION: Versed 1 mg and fentanyl 75 mcg were administered intravenously with continuous vital signs monitoring by the radiology nurse under my direct supervision. Patient was alert and oriented post procedure. Total face to face moderate   sedation time: 15 minutes.     FLUOROSCOPIC TIME: 0.2 minutes.     RADIATION DOSE: Air Kerma: 2 mGy     CONTRAST: None.     COMPLICATIONS: No immediate complications.     STERILE BARRIER TECHNIQUE: Maximum sterile barrier technique was used. Cutaneous antisepsis was performed at the operative site with application of 2% chlorhexidine and large sterile drape. Prior to the procedure, the  and assistant performed   hand hygiene and wore hat, mask, sterile " gown, and sterile gloves during the entire procedure.     PROCEDURE/TECHNIQUE: The procedure including the risks, benefits, and alternatives to the procedure itself were discussed with the patient. After all questions were answered informed consent obtained. The patient was then brought to the Interventional   Radiology suite, placed in the supine position, and a timeout was performed. A limited preliminary ultrasound demonstrated a patent right internal jugular vein. The right side of the patient's neck and upper anterior chest were then sterilely prepped and   draped. After giving local anesthesia, a 21 gauge needle was used to puncture the right internal jugular vein from a low lateral approach under ultrasound guidance with an ultrasound image stored. A 0.018 inch wire was then advanced through the needle   into the central veins under fluoroscopic guidance. The needle was exchanged over the wire for a 5 [place] coaxial transitional dilator. The wire tip was then situated at the cavoatrial junction as confirmed fluoroscopically, and a length measurement was   made. The wire and inner 3 [place] dilator were then exchanged through the 5 [place] outer dilator for a 0.035 inch guidewire, which was advanced under fluoroscopic guidance into the IVC. A site on the upper anterior right chest was then anesthetized using   lidocaine, as was a subcutaneous track connecting this to the venipuncture site. An #11 blade was then used to make a small incision on the upper anterior right chest. The proximal end of a 19 cm tip-to-cuff length double lumen hemodialysis catheter was   then tunnelled from the incision to the venipuncture site. The dilator in the vein was exchanged over the 0.035 inch wire for a dilator followed by a peel-away sheath. The wire was then removed under controlled respiration, the catheter was advanced   through the peel-away sheath, and the peel-away sheath was removed. Final fluoroscopic image demonstrated  the catheter tip to be positioned at the cavoatrial junction. The catheter was accessed and noted to aspirate and flush well. The venipuncture   incision was then closed with a skin glue.  The external hub of the catheter was secured with 2-0 nylon stitches. The catheter was flushed with 1000 unit/ml heparinized saline. Sterile dressings were applied. The patient appeared to tolerate the   procedure well.     The central venous catheter insertion checklist was reviewed prior to placement and followed throughout the procedure.     FINDINGS:   1. ULTRASOUND: Patent and compressible right internal jugular vein. Images recorded without and with compression.  2. Final fluoroscopic image demonstrates a right internal jugular tunneled central venous catheter with tip at the cavoatrial junction.                                                                      IMPRESSION:    1.  Successful right tunneled central venous catheter placement.       NPO Status: MN   Anticoagulation/Antiplatelets/Bleeding tendencies:  Aspirin 81 mg PO QD   Antibiotics: Antibiotics not clinically indicated for fistulogram.  Recommend Ancef 2 g  IV x1 ordered for procedure for procedural prophylaxis per clinical practice guidelines if tunneled hemodialysis catheter placed.     Review of Systems: A comprehensive 10-point review of systems was performed. All systems were reviewed and negative with exception to those reported in the HPI.    PMH:  Past Medical History:   Diagnosis Date     Acute respiratory failure with hypoxia (H) 04/10/2022     Chronic kidney disease, stage V (H)      Diabetes (H) 02/09/2020     Diabetes mellitus (H)      Fluid overload 04/10/2022     HTN (hypertension) 09/14/2015     Hypertensive retinopathy of both eyes 04/19/2013     Nephrotic range proteinuria 06/11/2020     Obesity (BMI 30.0-34.9) 02/09/2020     Renal failure, unspecified chronicity 02/09/2020     Smoking 05/04/2016       PSH:  Past Surgical History:    Procedure Laterality Date     CREATE FISTULA ARTERIOVENOUS UPPER EXTREMITY Left 4/13/2022    Procedure: CREATION, ARTERIOVENOUS FISTULA,LEFT  UPPER EXTREMITY;THROMBELECTOMY LEFT CEPHALIC VEIN.;  Surgeon: Sobeida Joya MD;  Location: [facility] Main OR     IR CVC NON TUNNEL LINE REMOVAL  4/28/2022     IR CVC NON TUNNEL PLACEMENT  4/26/2022     IR CVC TUNNEL PLACEMENT > 5 YRS OF AGE  4/29/2022       ALLERGIES:  No Known Allergies    MEDICATIONS:  Current Outpatient Medications   Medication     amLODIPine (NORVASC) 10 MG tablet     aspirin (ASA) 81 MG EC tablet     Blood Glucose Monitoring Suppl (CONTOUR BLOOD GLUCOSE SYSTEM) w/Device KIT     calcitRIOL (ROCALTROL) 0.25 MCG capsule     carvedilol (COREG) 25 MG tablet     Continuous Blood Gluc Sensor (FREESTYLE HARINI 2 SENSOR) MISC     Glucose Blood (VAY[facility] CONTOUR TEST) strip     Vitamin D3 (CHOLECALCIFEROL) 25 mcg (1000 units) tablet     Current Facility-Administered Medications   Medication     heparin (PRESSURE BAG) 2 Units/mL in 0.9% NaCl (500 mL)     lidocaine (LMX4) cream     lidocaine 1 % 0.1-1 mL     sodium chloride (PF) 0.9% PF flush 3 mL     sodium chloride (PF) 0.9% PF flush 3 mL         LABS:  No results found for: INR   Hemoglobin   Date Value Ref Range Status   04/28/2022 7.6 (L) 11.7 - 15.7 g/dL Final   ]  Platelet Count   Date Value Ref Range Status   04/28/2022 150 150 - 450 10e3/uL Final     Creatinine   Date Value Ref Range Status   04/25/2022 9.57 (HH) 0.60 - 1.10 mg/dL Final   11/08/2013 0.5 (L) 0.6 - 1.3 mg/dL Final     Potassium   Date Value Ref Range Status   04/30/2022 4.0 3.5 - 5.0 mmol/L Final   11/08/2013 4.0 3.4 - 5.3 mmol/L Final     HCG Qualitative: declined by pt.     EXAM:  BP (!) 142/63 (BP Location: Right arm)   Pulse 79   Temp 98.4  F (36.9  C) (Oral)   Resp 16   Ht 1.524 m (5')   Wt 82.1 kg (181 lb)   SpO2 100%   BMI 35.35 kg/m    General:  Stable.  In no acute distress.    Neuro:  A&O x 3. Moves all extremities  equally.  Resp:  Lungs clear to auscultation bilaterally.  Cardio:  S1S2 and reg, without murmur, clicks or rubs    LEFT fistula appreciated.  Aneurysmal: no.  Thrill and bruit appreciated: yes, but difficult to appreciate, faint.  No open areas, discharge or drainage appreciated.  Marked.       Pre-Sedation Assessment:  Mallampati Airway Classification:  II - Faucial pillars and soft palate may be seen, but uvula is masked by the base of the tongue  Previous reaction to anesthesia/sedation:  Yes: n/v  Sedation plan based on assessment: Moderate (conscious) sedation  ASA Classification: Class 3 - SEVERE SYSTEMIC DISEASE, DEFINITE FUNCTIONAL LIMITATIONS.   Code Status: FULL CODE    ASSESSMENT: 37 year old female     - Complex PMH, per above  - HD per above  - Concerns RE: possible outflow stenosis, difficulty accessing AVF, maturation  - N/V with sedation  - Current HD tunneled line in place, working well.     Presents for a fistulogram    PLAN:    Proceed with fistulogram with possible intervention, with sedation    - Zofran per MAR prior to sedation  - Follow up with Dr. Joya in 2-3 weeks, as directed      Procedure, risks/benefits, details, alternatives, and sedation reviewed with patient/family and she verbalized understanding. All questions answered. OK to proceed with above radiology procedure.       HOUSTON NAJERA NP  Interventional Radiology  [phone]

## 2022-08-23 ENCOUNTER — ANCILLARY PROCEDURE (OUTPATIENT)
Dept: VASCULAR ULTRASOUND | Facility: CLINIC | Age: 37
End: 2022-08-23
Attending: SURGERY
Payer: COMMERCIAL

## 2022-08-23 DIAGNOSIS — N18.9 CHRONIC KIDNEY DISEASE, UNSPECIFIED CKD STAGE: ICD-10-CM

## 2022-08-23 PROCEDURE — 93990 DOPPLER FLOW TESTING: CPT | Mod: 26 | Performed by: SURGERY

## 2022-08-23 PROCEDURE — 93990 DOPPLER FLOW TESTING: CPT

## 2022-08-25 ENCOUNTER — VIRTUAL VISIT (OUTPATIENT)
Dept: VASCULAR SURGERY | Facility: CLINIC | Age: 37
End: 2022-08-25
Attending: SURGERY
Payer: COMMERCIAL

## 2022-08-25 DIAGNOSIS — Z99.2 ENCOUNTER REGARDING VASCULAR ACCESS FOR DIALYSIS FOR ESRD (H): Primary | ICD-10-CM

## 2022-08-25 DIAGNOSIS — N18.6 ENCOUNTER REGARDING VASCULAR ACCESS FOR DIALYSIS FOR ESRD (H): Primary | ICD-10-CM

## 2022-08-25 PROCEDURE — 99213 OFFICE O/P EST LOW 20 MIN: CPT | Mod: TEL | Performed by: PHYSICIAN ASSISTANT

## 2022-08-25 NOTE — PROGRESS NOTES
VASCULAR SURGERY PROGRESS NOTE    LOCATION:  [facility] - Scheduled for video visit, issues with audio so converted to telephone visit     Alysia Hernandez  Medical Record #: [id number]  YOB: 1985  Age: 37 year old     Date of Service: 8/25/2022    PRIMARY CARE PROVIDER: No Ref-Primary, Physician    Reason for visit:  Follow up s/p left upper extremity fistulogram     IMPRESSION: 38 YO female status post left brachiocephalic fistula creation. Dialysis had inconsistent success using fistula so patient underwent fistulogram with embolization of two dominant competing veins and balloon angioplasty of the arteriovenous anastomosis. Repeat ultrasound done today shows patent fistulas with excellent flow volumes.     RECOMMENDATION/RISKS: OK to begin using fistula for dialysis access at this time. If the fistula remains difficult to access, can consider possible transposition/translocation as there has been concern for the fistula being too deep.     HPI:  Alysia Hernandez is a 37 year old female with past medical history significant for hypertension, obesity, type 2 diabetes mellitus, and end stage renal disease on hemodialysis. She is status post left brachiocephalic fistula creation followed by recent fistulogram with embolization of two dominant competing veins and balloon angioplasty of the arteriovenous anastomosis.     Today, Mrs. Hernandez is doing well. Denies any pain, numbness, or weakness to left arm/hand. Her incision is well healed. She currently has a tunneled catheter in place that is functioning without issue for dialysis access. Is looking forward to getting this out at some point. Fistula has not yet been accessed since her recent procedure.  No other concerns today.     REVIEW OF SYSTEMS:    A 12 point ROS was reviewed and is negative except for what is listed above in HPI.    PHH:    Past Medical History:   Diagnosis Date     Acute respiratory failure with hypoxia (H) 04/10/2022      Chronic kidney disease, stage V (H)      Diabetes (H) 02/09/2020     Diabetes mellitus (H)      Fluid overload 04/10/2022     HTN (hypertension) 09/14/2015     Hypertensive retinopathy of both eyes 04/19/2013     Nephrotic range proteinuria 06/11/2020     Obesity (BMI 30.0-34.9) 02/09/2020     Renal failure, unspecified chronicity 02/09/2020     Smoking 05/04/2016     Past Surgical History:   Procedure Laterality Date     CREATE FISTULA ARTERIOVENOUS UPPER EXTREMITY Left 4/13/2022    Procedure: CREATION, ARTERIOVENOUS FISTULA,LEFT  UPPER EXTREMITY;THROMBELECTOMY LEFT CEPHALIC VEIN.;  Surgeon: Sobeida Joya MD;  Location: [facility] OR     IR CVC NON TUNNEL LINE REMOVAL  4/28/2022     IR CVC NON TUNNEL PLACEMENT  4/26/2022     IR CVC TUNNEL PLACEMENT > 5 YRS OF AGE  4/29/2022     IR DIALYSIS FISTULOGRAM LEFT  8/11/2022     ALLERGIES:  Patient has no known allergies.    MEDS:    Current Outpatient Medications:      amLODIPine (NORVASC) 10 MG tablet, Take 1 tablet (10 mg) by mouth daily, Disp: 90 tablet, Rfl: 3     aspirin (ASA) 81 MG EC tablet, Take 1 tablet (81 mg) by mouth daily, Disp: 90 tablet, Rfl: 3     Blood Glucose Monitoring Suppl (CONTOUR BLOOD GLUCOSE SYSTEM) w/Device KIT, 1 Device by Percutaneous route daily, Disp: 1 kit, Rfl: 0     calcitRIOL (ROCALTROL) 0.25 MCG capsule, Take 1 capsule (0.25 mcg) by mouth three times a week (Patient taking differently: Take 0.25 mcg by mouth three times a week Mon-Wed-Fri), Disp: 30 capsule, Rfl: 1     carvedilol (COREG) 25 MG tablet, Take 1 tablet (25 mg) by mouth 2 times daily (with meals), Disp: 180 tablet, Rfl: 1     Continuous Blood Gluc Sensor (FREESTYLE HARINI 2 SENSOR) [facility], 1 Application every 14 days, Disp: 2 each, Rfl: 3     Glucose Blood (MARIA DEL CARMEN CONTOUR TEST) strip, Use to test blood sugars 2 times daily or as directed., Disp: 100 strip, Rfl: prn     Vitamin D3 (CHOLECALCIFEROL) 25 mcg (1000 units) tablet, Take by mouth daily, Disp: , Rfl:      SOCIAL HABITS:    History   Smoking Status     Current Every Day Smoker     Packs/day: 0.25     Types: Cigarettes   Smokeless Tobacco     Never Used     Comment: currently on E -cig. -- 11/8/13     Social History    Substance and Sexual Activity      Alcohol use: No      History   Drug Use No     FAMILY HISTORY:    Family History   Problem Relation Age of Onset     Diabetes Mother         50     Cerebrovascular Disease Mother      Hyperlipidemia Father      No Known Problems Sister      No Known Problems Brother      Cerebrovascular Disease Paternal Aunt      PE:  There were no vitals taken for this visit.  Wt Readings from Last 1 Encounters:   08/11/22 181 lb (82.1 kg)     There is no height or weight on file to calculate BMI.    EXAM:  No exam done, telephone/video visit     DIAGNOSTIC STUDIES:     Images:    IR Dialysis Fistulogram Left    Result Date: 8/11/2022  INDICATION: 37-year-old female with nonmaturing left brachial artery to cephalic vein fistula. Baseline physical examination reveals a palpable thrill.     IMPRESSION:    1. Left brachial artery to cephalic vein fistulogram demonstrates multiple competing veins overlying the upper arm. Widely patent outflow cephalic vein and central veins. There is moderate stenosis at the arteriovenous anastomosis.   2. Successful embolization of the two dominant competing veins and balloon angioplasty of the arteriovenous anastomosis. Post intervention imaging demonstrates excellent result with marked decrease in filling of competing veins and no significant residual stenosis at the arteriovenous anastomosis.     US Ext Arterial Venous Dialysis Acs Graft     Impression:   Patent left brachiocephalic fistula with excellent flow volumes and adequate caliber to suggest successful maturation of fistula.    LABS:      Sodium   Date Value Ref Range Status   04/25/2022 134 (L) 136 - 145 mmol/L Final   04/24/2022 133 (L) 136 - 145 mmol/L Final   04/23/2022 132 (L) 136 -  145 mmol/L Final   11/08/2013 136.0 133.0 - 144.0 mmol/L Final   09/26/2012 135.0 133.0 - 144.0 mmol/L Final     Urea Nitrogen   Date Value Ref Range Status   04/25/2022 73 (H) 8 - 22 mg/dL Final   04/24/2022 71 (H) 8 - 22 mg/dL Final   04/23/2022 71 (H) 8 - 22 mg/dL Final   11/08/2013 7.0 5.0 - 24.0 mg/dL Final   09/26/2012 12.0 5.0 - 24.0 mg/dL Final     Hemoglobin   Date Value Ref Range Status   04/28/2022 7.6 (L) 11.7 - 15.7 g/dL Final   04/24/2022 8.5 (L) 11.7 - 15.7 g/dL Final   04/23/2022 9.9 (L) 11.7 - 15.7 g/dL Final     Platelet Count   Date Value Ref Range Status   04/28/2022 150 150 - 450 10e3/uL Final   04/24/2022 262 150 - 450 10e3/uL Final   04/23/2022 303 150 - 450 10e3/uL Final     BNP   Date Value Ref Range Status   04/23/2022 419 (H) 0 - 64 pg/mL Final     20 minutes spent on the day of encounter doing chart review, history and exam, documentation, and further activities as noted.     5 minutes spent on video/telephone with patient     Jessica Arzate PA-C  VASCULAR SURGERY

## 2022-09-03 ENCOUNTER — HEALTH MAINTENANCE LETTER (OUTPATIENT)
Age: 37
End: 2022-09-03

## 2022-09-12 ENCOUNTER — TRANSFERRED RECORDS (OUTPATIENT)
Dept: HEALTH INFORMATION MANAGEMENT | Facility: CLINIC | Age: 37
End: 2022-09-12

## 2022-09-12 LAB — RETINOPATHY: POSITIVE

## 2022-09-22 ENCOUNTER — OFFICE VISIT (OUTPATIENT)
Dept: FAMILY MEDICINE | Facility: CLINIC | Age: 37
End: 2022-09-22
Payer: COMMERCIAL

## 2022-09-22 VITALS
WEIGHT: 184.6 LBS | DIASTOLIC BLOOD PRESSURE: 81 MMHG | HEIGHT: 60 IN | OXYGEN SATURATION: 100 % | BODY MASS INDEX: 36.24 KG/M2 | HEART RATE: 90 BPM | SYSTOLIC BLOOD PRESSURE: 131 MMHG

## 2022-09-22 DIAGNOSIS — I10 PRIMARY HYPERTENSION: ICD-10-CM

## 2022-09-22 DIAGNOSIS — N18.6 ESRD (END STAGE RENAL DISEASE) ON DIALYSIS (H): ICD-10-CM

## 2022-09-22 DIAGNOSIS — E11.21 TYPE 2 DIABETES MELLITUS WITH DIABETIC NEPHROPATHY, WITHOUT LONG-TERM CURRENT USE OF INSULIN (H): Primary | ICD-10-CM

## 2022-09-22 DIAGNOSIS — Z99.2 ESRD (END STAGE RENAL DISEASE) ON DIALYSIS (H): ICD-10-CM

## 2022-09-22 DIAGNOSIS — F17.200 TOBACCO DEPENDENCE: ICD-10-CM

## 2022-09-22 DIAGNOSIS — E66.01 MORBID OBESITY (H): ICD-10-CM

## 2022-09-22 PROBLEM — N25.81 SECONDARY HYPERPARATHYROIDISM OF RENAL ORIGIN (H): Status: ACTIVE | Noted: 2022-08-30

## 2022-09-22 PROBLEM — N17.9 ACUTE RENAL FAILURE SUPERIMPOSED ON CHRONIC KIDNEY DISEASE, UNSPECIFIED CKD STAGE, UNSPECIFIED ACUTE RENAL FAILURE TYPE: Status: RESOLVED | Noted: 2021-09-03 | Resolved: 2022-09-22

## 2022-09-22 PROBLEM — T22.292A: Status: RESOLVED | Noted: 2022-04-23 | Resolved: 2022-09-22

## 2022-09-22 PROBLEM — N17.9 ACUTE ON CHRONIC RENAL FAILURE (H): Status: RESOLVED | Noted: 2021-09-03 | Resolved: 2022-09-22

## 2022-09-22 PROBLEM — R50.9 FEVER, UNSPECIFIED FEVER CAUSE: Status: RESOLVED | Noted: 2022-04-23 | Resolved: 2022-09-22

## 2022-09-22 PROBLEM — N19 RENAL FAILURE, UNSPECIFIED CHRONICITY: Status: RESOLVED | Noted: 2020-02-09 | Resolved: 2022-09-22

## 2022-09-22 PROBLEM — J96.01 ACUTE RESPIRATORY FAILURE WITH HYPOXIA (H): Status: RESOLVED | Noted: 2022-04-23 | Resolved: 2022-09-22

## 2022-09-22 PROBLEM — N18.4 CKD (CHRONIC KIDNEY DISEASE) STAGE 4, GFR 15-29 ML/MIN (H): Status: RESOLVED | Noted: 2021-09-03 | Resolved: 2022-09-22

## 2022-09-22 PROBLEM — N18.9 ACUTE RENAL FAILURE SUPERIMPOSED ON CHRONIC KIDNEY DISEASE, UNSPECIFIED CKD STAGE, UNSPECIFIED ACUTE RENAL FAILURE TYPE: Status: RESOLVED | Noted: 2021-09-03 | Resolved: 2022-09-22

## 2022-09-22 PROBLEM — N18.9 ACUTE ON CHRONIC RENAL FAILURE (H): Status: RESOLVED | Noted: 2021-09-03 | Resolved: 2022-09-22

## 2022-09-22 LAB
ALBUMIN SERPL BCG-MCNC: 3.9 G/DL (ref 3.5–5.2)
ALP SERPL-CCNC: 70 U/L (ref 35–104)
ALT SERPL W P-5'-P-CCNC: 8 U/L (ref 10–35)
ANION GAP SERPL CALCULATED.3IONS-SCNC: 14 MMOL/L (ref 7–15)
AST SERPL W P-5'-P-CCNC: 13 U/L (ref 10–35)
BASOPHILS # BLD AUTO: 0.1 10E3/UL (ref 0–0.2)
BASOPHILS NFR BLD AUTO: 1 %
BILIRUB SERPL-MCNC: 0.3 MG/DL
BUN SERPL-MCNC: 33.8 MG/DL (ref 6–20)
CALCIUM SERPL-MCNC: 9.1 MG/DL (ref 8.6–10)
CHLORIDE SERPL-SCNC: 95 MMOL/L (ref 98–107)
CREAT SERPL-MCNC: 6.88 MG/DL (ref 0.51–0.95)
DEPRECATED HCO3 PLAS-SCNC: 25 MMOL/L (ref 22–29)
EOSINOPHIL # BLD AUTO: 0.5 10E3/UL (ref 0–0.7)
EOSINOPHIL NFR BLD AUTO: 7 %
ERYTHROCYTE [DISTWIDTH] IN BLOOD BY AUTOMATED COUNT: 14.1 % (ref 10–15)
GFR SERPL CREATININE-BSD FRML MDRD: 7 ML/MIN/1.73M2
GLUCOSE SERPL-MCNC: 172 MG/DL (ref 70–99)
HBA1C MFR BLD: 7.5 % (ref 0–5.6)
HCT VFR BLD AUTO: 37.5 % (ref 35–47)
HGB BLD-MCNC: 11.8 G/DL (ref 11.7–15.7)
IMM GRANULOCYTES # BLD: 0 10E3/UL
IMM GRANULOCYTES NFR BLD: 0 %
LYMPHOCYTES # BLD AUTO: 1.6 10E3/UL (ref 0.8–5.3)
LYMPHOCYTES NFR BLD AUTO: 20 %
MCH RBC QN AUTO: 27.5 PG (ref 26.5–33)
MCHC RBC AUTO-ENTMCNC: 31.5 G/DL (ref 31.5–36.5)
MCV RBC AUTO: 87 FL (ref 78–100)
MONOCYTES # BLD AUTO: 0.9 10E3/UL (ref 0–1.3)
MONOCYTES NFR BLD AUTO: 12 %
NEUTROPHILS # BLD AUTO: 5 10E3/UL (ref 1.6–8.3)
NEUTROPHILS NFR BLD AUTO: 61 %
PLATELET # BLD AUTO: 227 10E3/UL (ref 150–450)
POTASSIUM SERPL-SCNC: 4.9 MMOL/L (ref 3.4–5.3)
PROT SERPL-MCNC: 8 G/DL (ref 6.4–8.3)
RBC # BLD AUTO: 4.29 10E6/UL (ref 3.8–5.2)
SODIUM SERPL-SCNC: 134 MMOL/L (ref 136–145)
WBC # BLD AUTO: 8.1 10E3/UL (ref 4–11)

## 2022-09-22 PROCEDURE — 85025 COMPLETE CBC W/AUTO DIFF WBC: CPT | Performed by: FAMILY MEDICINE

## 2022-09-22 PROCEDURE — 99215 OFFICE O/P EST HI 40 MIN: CPT | Performed by: FAMILY MEDICINE

## 2022-09-22 PROCEDURE — 80053 COMPREHEN METABOLIC PANEL: CPT | Performed by: FAMILY MEDICINE

## 2022-09-22 PROCEDURE — 83036 HEMOGLOBIN GLYCOSYLATED A1C: CPT | Performed by: FAMILY MEDICINE

## 2022-09-22 PROCEDURE — 36415 COLL VENOUS BLD VENIPUNCTURE: CPT | Performed by: FAMILY MEDICINE

## 2022-09-22 RX ORDER — GLIPIZIDE 2.5 MG/1
2.5 TABLET, EXTENDED RELEASE ORAL DAILY
Qty: 90 TABLET | Refills: 1 | Status: SHIPPED | OUTPATIENT
Start: 2022-09-22 | End: 2023-03-03

## 2022-09-22 NOTE — PROGRESS NOTES
Assessment & Plan     ICD-10-CM    1. Type 2 diabetes mellitus with diabetic nephropathy, without long-term current use of insulin (H)  E11.21 Hemoglobin A1c     Hemoglobin A1c     glipiZIDE (GLUCOTROL XL) 2.5 MG 24 hr tablet     AMB Adult Diabetes Educator Referral   2. Morbid obesity (H)  E66.01    3. ESRD (end stage renal disease) on dialysis (H)  N18.6 CBC with platelets and differential    Z99.2 Comprehensive metabolic panel (BMP + Alb, Alk Phos, ALT, AST, Total. Bili, TP)   4. Tobacco dependence  F17.200    5. Primary hypertension  I10      Medical decision making: Patient with morbid obesity complicated with type 2 diabetes and hypertension presents today for follow-up.  She is now on hemodialysis for ESRD and goes to [facility] dialysis 3 times a week.  She has been evaluated for kidney transplant.  Her diabetes which was in remission due to acute illness during hospitalization in April is back now with A1c of 7.5.  Discussed with patient that ideally insulin is indicated.  Patient dislikes needlesticks and pokes and defers it.  She is willing to see the diabetes educator.  Meanwhile we will start with glipizide at a low dose 2.5 mg daily.  Advise cutting down on carbs.  Patient informs me that after her dialysis, she is usually carb craving.  Currently she is on short-term disability and planning to go on long-term disability.  She does continue to smoke.  Strongly recommend that she quit smoking.  She does have nicotine patches.  Regarding her blood pressure, she is on amlodipine with blood pressure in reasonable range.  However, she informs me that after her run of dialysis, she is dizzy.  Discussed fluid balance.  Discussed with patient that she is due for Pap smear and annual physical.  She will schedule it in near future.    More than 40 minutes spent with the patient including chart review, recent visit with the transplant physician and work-up, patient interview, documentation.     Nicotine/Tobacco  Cessation:  She reports that she has been smoking cigarettes. She has been smoking about 0.25 packs per day. She has never used smokeless tobacco.  Nicotine/Tobacco Cessation Plan:   Self help information given to patient      BMI:   Estimated body mass index is 36.05 kg/m  as calculated from the following:    Height as of this encounter: 1.524 m (5').    Weight as of this encounter: 83.7 kg (184 lb 9.6 oz).   Weight management plan: Discussed healthy diet and exercise guidelines    MEDICATIONS:   Orders Placed This Encounter   Medications     glipiZIDE (GLUCOTROL XL) 2.5 MG 24 hr tablet     Sig: Take 1 tablet (2.5 mg) by mouth daily     Dispense:  90 tablet     Refill:  1          - Continue other medications without change  Work on weight loss  Regular exercise    Return in about 3 months (around 12/22/2022) for Routine preventive.    Toby Howard MD  [facility] LYDIA Hatfield is a 37 year old accompanied by her N/A, presenting for the following health issues:  Diabetes (Pt is fasting today. A1C done today)      History of Present Illness       Diabetes:   She presents for follow up of diabetes.  She is checking home blood glucose one time daily. She checks blood glucose after meals.  Blood glucose is sometimes over 200 and never under 70. She is aware of hypoglycemia symptoms including shakiness and dizziness. She is concerned about blood sugar frequently over 200. She is having burning in feet.         She eats 0-1 servings of fruits and vegetables daily.She consumes 1 sweetened beverage(s) daily.She exercises with enough effort to increase her heart rate 9 or less minutes per day.  She exercises with enough effort to increase her heart rate 3 or less days per week.   She is taking medications regularly.     Patient Active Problem List   Diagnosis     Other specified visual disturbances     Tinnitus     Diabetes type 2, uncontrolled (H)     Morbid obesity (H)     HTN  (hypertension)     Hypertensive retinopathy of both eyes     Microalbuminuria     Smoking     CKD (chronic kidney disease) stage 5, GFR less than 15 ml/min (H)     ESRD (end stage renal disease) on dialysis (H)     Secondary hyperparathyroidism of renal origin (H)     Tobacco dependence     Current Outpatient Medications   Medication     amLODIPine (NORVASC) 10 MG tablet     aspirin (ASA) 81 MG EC tablet     Blood Glucose Monitoring Suppl (CONTOUR BLOOD GLUCOSE SYSTEM) w/Device KIT     calcitRIOL (ROCALTROL) 0.25 MCG capsule     carvedilol (COREG) 25 MG tablet     Continuous Blood Gluc Sensor (FREESTYLE HARINI 2 SENSOR) MISC     glipiZIDE (GLUCOTROL XL) 2.5 MG 24 hr tablet     Glucose Blood (MARIA DEL CARMEN CONTOUR TEST) strip     No current facility-administered medications for this visit.         Review of Systems   Constitutional, HEENT, cardiovascular, pulmonary, GI, , musculoskeletal, neuro, skin, endocrine and psych systems are negative, except as otherwise noted.      Objective    /81 (BP Location: Right arm, Patient Position: Sitting, Cuff Size: Adult Large)   Pulse 90   Ht 1.524 m (5')   Wt 83.7 kg (184 lb 9.6 oz)   SpO2 100%   BMI 36.05 kg/m    Body mass index is 36.05 kg/m .  Physical Exam   GENERAL: alert and no distress  PSYCH: mentation appears normal, affect normal/bright    Results for orders placed or performed in visit on 09/22/22 (from the past 24 hour(s))   CBC with platelets and differential    Narrative    The following orders were created for panel order CBC with platelets and differential.  Procedure                               Abnormality         Status                     ---------                               -----------         ------                     CBC with platelets and d...[[id number]]                      Final result                 Please view results for these tests on the individual orders.   Hemoglobin A1c   Result Value Ref Range    Hemoglobin A1C 7.5 (H) 0.0 - 5.6 %    CBC with platelets and differential   Result Value Ref Range    WBC Count 8.1 4.0 - 11.0 10e3/uL    RBC Count 4.29 3.80 - 5.20 10e6/uL    Hemoglobin 11.8 11.7 - 15.7 g/dL    Hematocrit 37.5 35.0 - 47.0 %    MCV 87 78 - 100 fL    MCH 27.5 26.5 - 33.0 pg    MCHC 31.5 31.5 - 36.5 g/dL    RDW 14.1 10.0 - 15.0 %    Platelet Count 227 150 - 450 10e3/uL    % Neutrophils 61 %    % Lymphocytes 20 %    % Monocytes 12 %    % Eosinophils 7 %    % Basophils 1 %    % Immature Granulocytes 0 %    Absolute Neutrophils 5.0 1.6 - 8.3 10e3/uL    Absolute Lymphocytes 1.6 0.8 - 5.3 10e3/uL    Absolute Monocytes 0.9 0.0 - 1.3 10e3/uL    Absolute Eosinophils 0.5 0.0 - 0.7 10e3/uL    Absolute Basophils 0.1 0.0 - 0.2 10e3/uL    Absolute Immature Granulocytes 0.0 <=0.4 10e3/uL

## 2022-10-06 ENCOUNTER — VIRTUAL VISIT (OUTPATIENT)
Dept: URGENT CARE | Facility: CLINIC | Age: 37
End: 2022-10-06
Payer: COMMERCIAL

## 2022-10-06 DIAGNOSIS — Z99.2 ESRD (END STAGE RENAL DISEASE) ON DIALYSIS (H): ICD-10-CM

## 2022-10-06 DIAGNOSIS — J22 LOWER RESPIRATORY INFECTION: ICD-10-CM

## 2022-10-06 DIAGNOSIS — N18.5 CKD (CHRONIC KIDNEY DISEASE) STAGE 5, GFR LESS THAN 15 ML/MIN (H): ICD-10-CM

## 2022-10-06 DIAGNOSIS — E66.01 MORBID OBESITY (H): ICD-10-CM

## 2022-10-06 DIAGNOSIS — R05.1 ACUTE COUGH: Primary | ICD-10-CM

## 2022-10-06 DIAGNOSIS — N18.6 ESRD (END STAGE RENAL DISEASE) ON DIALYSIS (H): ICD-10-CM

## 2022-10-06 DIAGNOSIS — F17.200 TOBACCO DEPENDENCE: ICD-10-CM

## 2022-10-06 PROCEDURE — 99213 OFFICE O/P EST LOW 20 MIN: CPT | Mod: GT

## 2022-10-06 RX ORDER — BENZONATATE 100 MG/1
CAPSULE ORAL
Qty: 30 CAPSULE | Refills: 0 | Status: SHIPPED | OUTPATIENT
Start: 2022-10-06 | End: 2022-11-08

## 2022-10-06 RX ORDER — AZITHROMYCIN 250 MG/1
TABLET, FILM COATED ORAL
Qty: 6 TABLET | Refills: 0 | Status: SHIPPED | OUTPATIENT
Start: 2022-10-06 | End: 2022-10-11

## 2022-10-06 NOTE — PATIENT INSTRUCTIONS
Azithromycin 250 mg as directed- 2 pills together at the same time today then 1 pill a day for the next 4 days.  Tessalon Perles- take 1-2 capsules 3 times daily as needed for cough.    Rest! Your body needs more rest to heal.  Drink plenty of fluids (warm fluids like tea or soup are soothing and reduce cough)  Sit in the bathroom with a hot shower running and breathe in the steam.  Honey may soothe your sore throat and help manage your cough- may take straight or in warm water with lemon juice. Monitor blood sugars.  Avoid smoke (cigarettes, bonfires, fireplace, wood burning stoves).    Good handwashing is the best way to prevent spread of germs  Present to emergency room if you develop trouble breathing, swallowing or cough-up blood.  Follow up with your primary care provider if symptoms worsen or fail to improve as expected.

## 2022-10-06 NOTE — PROGRESS NOTES
Assessment & Plan     Acute cough  - benzonatate (TESSALON) 100 MG capsule; Take 1-2 capsules by mouth up to 3 times daily as needed for cough.    Morbid obesity (H)  Increased risk for infection    CKD (chronic kidney disease) stage 5, GFR less than 15 ml/min (H)  No dose adjustment of Tessalon or azithromycin    ESRD (end stage renal disease) on dialysis (H)  No dose adjustment of chest on the azithromycin    Tobacco dependence  Increased risk for infection    Lower respiratory infection  - azithromycin (ZITHROMAX) 250 MG tablet; Take 2 tablets (500 mg) by mouth daily for 1 day, THEN 1 tablet (250 mg) daily for 4 days.    If symptoms do not improve in 5 days, follow-up in urgent care for further evaluation.  Consider chest x-ray.    Return in about 1 week (around 10/13/2022) for visit with primary care provider or at urgent care if not improving.     Estimated body mass index is 36.05 kg/m  as calculated from the following:    Height as of 9/22/22: 1.524 m (5').    Weight as of 9/22/22: 83.7 kg (184 lb 9.6 oz).  GFR Estimate   Date Value Ref Range Status   09/22/2022 7 (L) >60 mL/min/1.73m2 Final     Comment:     Effective December 21, 2021 eGFRcr in adults is calculated using the 2021 CKD-EPI creatinine equation which includes age and gender (Stevenson et al., [facility], DOI: 10.1056/MGCCxa3708628)   01/29/2021 17 (L) >60 mL/min/1.73m2 Final     ALEJANDRA Noe [facility]    Subjective   Alysia Hernandez is a 37 year old who presents for the following health issues    HPI    Alysia presents via video for evaluation of a cough.  Symptoms first began 2 to 3 weeks ago.  She states her cough feels like it is coming from a tickle in her throat versus deep in her chest.  Cough is much worse when she lays down at night and significantly better clinically.  She does have a sore throat but otherwise no associated symptoms.  She has not had nasal congestion, runny nose, malaise.  She has had heartburn  before and this feels different.  She states the cough seems to be getting worse she given her time sleeping at night because of the cough.  She has used DayQuil, Delsym and Robitussin these have not been effective.    Review of Systems   ROS negative except as stated above.      Objective    Physical Exam   GENERAL: Healthy, alert and no distress  EYES: Eyes grossly normal to inspection.  No discharge or erythema, or obvious scleral/conjunctival abnormalities.  HENT: Normal cephalic/atraumatic.  External ears, nose and mouth without ulcers or lesions.  No nasal drainage visible.  RESP: No audible cough wheeze or visible cyanosis.  No visible retractions or increased work of breathing.    SKIN: Visible skin clear. No significant rash, abnormal pigmentation or lesions.  NEURO: Cranial nerves grossly intact.  Mentation and speech appropriate for age.  PSYCH: Mentation appears normal, affect normal/bright, judgement and insight intact, normal speech and appearance well-groomed.    Type of service:  Video Visit  Video Start Time: 1:08pm  Video End Time: 1:16pm  Originating Location (pt. Location): Home  Distant Location (provider location[facility]  [facility]  Platform used for Video Visit: [employer]

## 2022-10-12 DIAGNOSIS — E11.22 TYPE 2 DIABETES MELLITUS WITH CHRONIC KIDNEY DISEASE, WITHOUT LONG-TERM CURRENT USE OF INSULIN, UNSPECIFIED CKD STAGE (H): ICD-10-CM

## 2022-10-17 ENCOUNTER — TELEPHONE (OUTPATIENT)
Dept: VASCULAR SURGERY | Facility: CLINIC | Age: 37
End: 2022-10-17

## 2022-10-17 DIAGNOSIS — N18.4 CHRONIC KIDNEY DISEASE, STAGE IV (SEVERE) (H): Primary | ICD-10-CM

## 2022-10-17 DIAGNOSIS — N18.9 CHRONIC KIDNEY DISEASE, UNSPECIFIED CKD STAGE: ICD-10-CM

## 2022-10-17 DIAGNOSIS — Z99.2 ENCOUNTER REGARDING VASCULAR ACCESS FOR DIALYSIS FOR ESRD (H): ICD-10-CM

## 2022-10-17 DIAGNOSIS — N18.6 ENCOUNTER REGARDING VASCULAR ACCESS FOR DIALYSIS FOR ESRD (H): ICD-10-CM

## 2022-10-17 NOTE — TELEPHONE ENCOUNTER
Patient called to states she was supposed to call back if they were having continued problems with her dialysis access, and they are.  She would like a call back to discuss what she should do moving forward.     [phone]

## 2022-10-17 NOTE — TELEPHONE ENCOUNTER
Jessica WILSON recommended repeat ultrasound of left AVF and see Dr Joya. Message left for patient.

## 2022-10-19 NOTE — TELEPHONE ENCOUNTER
"Prescription approved per [facility] Refill Protocol.    Last Written Prescription Date: 9/3/21  Last Fill Quantity: 90, # refills: 3  Last office visit provider: Anita 9/22/22        Requested Prescriptions   Pending Prescriptions Disp Refills     aspirin (ASA) 81 MG EC tablet 90 tablet 0     Sig: Take 1 tablet (81 mg) by mouth daily       Analgesics (Non-Narcotic Tylenol and ASA Only) Passed - 10/12/2022 10:03 AM        Passed - Recent (12 mo) or future (30 days) visit within the authorizing provider's specialty     Patient has had an office visit with the authorizing provider or a provider within the authorizing providers department within the previous 12 mos or has a future within next 30 days. See \"Patient Info\" tab in inbasket, or \"Choose Columns\" in Meds & Orders section of the refill encounter.              Passed - Patient is age 20 years or older     If ASA is flagged for ages under 20 years old. Forward to provider for confirmation Ryes Syndrome is not a concern.              Passed - Medication is active on med list                 Jenniffer Phipps RN 10/19/22 9:37 AM    "

## 2022-10-28 DIAGNOSIS — I10 ESSENTIAL HYPERTENSION: ICD-10-CM

## 2022-10-28 NOTE — TELEPHONE ENCOUNTER
Medication Request  Medication name:   Pending Prescriptions:                       Disp   Refills    amLODIPine (NORVASC) 10 MG tablet         90 tab*3            Sig: Take 1 tablet (10 mg) by mouth daily    [facility]ed [facility]  When was patient last seen for this?:  9/22/22  Patient offered appointment:  No  Okay to leave a detailed message: yes

## 2022-10-29 RX ORDER — AMLODIPINE BESYLATE 10 MG/1
10 TABLET ORAL DAILY
Qty: 90 TABLET | Refills: 3 | Status: SHIPPED | OUTPATIENT
Start: 2022-10-29

## 2022-11-07 NOTE — PROGRESS NOTES
Patient is in clinic today to see MD Crystal Joya.      Patient is here for a follow up to discuss fistula.    The patient does smoke.    Pt is currently taking ASA.    The patient states he/she are NOT wearing compression .    Pt dialyzes days: M W F    Swelling is observed in none.    Pt rates pain 0/10 located at .    Pts symptoms include  in  extremity.    Patients condition is stable.    The provider has been notified that the patient has no complaints.

## 2022-11-08 ENCOUNTER — HOSPITAL ENCOUNTER (OUTPATIENT)
Dept: ULTRASOUND IMAGING | Facility: CLINIC | Age: 37
Discharge: HOME | End: 2022-11-08
Attending: SURGERY | Admitting: SURGERY
Payer: MEDICARE

## 2022-11-08 ENCOUNTER — OFFICE VISIT (OUTPATIENT)
Dept: VASCULAR SURGERY | Facility: CLINIC | Age: 37
End: 2022-11-08
Attending: SURGERY
Payer: MEDICARE

## 2022-11-08 VITALS — HEART RATE: 81 BPM | SYSTOLIC BLOOD PRESSURE: 140 MMHG | TEMPERATURE: 99 F | DIASTOLIC BLOOD PRESSURE: 70 MMHG

## 2022-11-08 DIAGNOSIS — N18.4 CHRONIC KIDNEY DISEASE, STAGE IV (SEVERE) (H): ICD-10-CM

## 2022-11-08 DIAGNOSIS — N18.9 CHRONIC KIDNEY DISEASE, UNSPECIFIED CKD STAGE: Primary | ICD-10-CM

## 2022-11-08 DIAGNOSIS — N18.9 CHRONIC KIDNEY DISEASE, UNSPECIFIED CKD STAGE: ICD-10-CM

## 2022-11-08 PROCEDURE — 93990 DOPPLER FLOW TESTING: CPT

## 2022-11-08 PROCEDURE — 99213 OFFICE O/P EST LOW 20 MIN: CPT | Performed by: SURGERY

## 2022-11-08 RX ORDER — CALCIUM ACETATE 667 MG/1
CAPSULE ORAL
COMMUNITY
Start: 2022-10-10

## 2022-11-08 ASSESSMENT — PAIN SCALES - GENERAL: PAINLEVEL: NO PAIN (0)

## 2022-11-08 NOTE — NURSING NOTE
Initial BP (!) 140/70   Pulse 81   Temp 99  F (37.2  C) (Tympanic)  Estimated body mass index is 36.05 kg/m  as calculated from the following:    Height as of 9/22/22: 1.524 m (5').    Weight as of 9/22/22: 83.7 kg (184 lb 9.6 oz). .    Serene Javed LPN on 11/8/2022 at 3:18 PM

## 2022-11-08 NOTE — PATIENT INSTRUCTIONS
Jung Hatfield,    Thank you for entrusting your care with us today. After your visit today with MD Sobeida Joya this is the plan that was discussed at your appointment.    Plan a fistulagram with Dr Jesus Pierce      I am including additional information on these things and our contact information if you have any questions or concerns.   Please do not hesitate to reach out to us if you felt we did not answer your questions or you are unsure of the treatment plan after your visit today. Our number is [phone].Thank you for trusting us with your care.         Again thank you for your time.     Rhoda Hernandez RN

## 2022-11-10 ENCOUNTER — TELEPHONE (OUTPATIENT)
Dept: VASCULAR SURGERY | Facility: CLINIC | Age: 37
End: 2022-11-10

## 2022-11-10 NOTE — PROGRESS NOTES
VASCULAR SURGERY PROGRESS NOTE   VASCULAR SURGEON: Sobeida Joya MD, RPVI     LOCATION:  [facility]     Alysia Hernandez   Medical Record #:  [id number]  YOB: 1985  Age:  37 year old     Date of Service: 11/8/2022    PRIMARY CARE PROVIDER: No Ref-Primary, Physician      Reason for visit: Follow-up    IMPRESSION: 37-year-old female with a history of left brachiocephalic fistula creation.  Patient has undergone a fistulogram back in August for treatment of AV anastomosis stenosis as well as embolization of few venous branches.  Patient states that on third fistulogram the dialysis sessions have improved until recently when the nurses started cannulation.  She was referred to vascular surgery for second opinion and she was recommended to translocation.  Today's ultrasound did show relatively superficial position of the fistula.  The proximal portion of his wound is 6 mm deep but more distal portion of the fistula dives and is 1.3 cm deep.  There is definitely a 10 cm portion of the fistula that is very superficial and  can be used for dialysis.  Ultrasound also did not show restenosis of the AV  anastomosis with velocities being more more than 500 cm/s.    RECOMMENDATION/RISKS: We will proceed with fistulogram to treat the anastomotic stenosis.  Should that fail over time, would recommend a revision of the AV anastomosis.  At the same time we might consider doing translocation of the proximal portion of the vein.    HPI:  Alysia Hernandez is a 37 year old female who was seen today for follow-up.  Patient is doing well in all regards and denies any significant complaints.  She states that dialysis sessions have been difficult due to problematic cannulation.    REVIEW OF SYSTEMS:    A 12 point ROS was reviewed and is negative     PHH:    Past Medical History:   Diagnosis Date     Acute respiratory failure with hypoxia (H) 04/10/2022     Chronic kidney disease, stage V (H)      Diabetes (H)  02/09/2020     Diabetes mellitus (H)      Fluid overload 04/10/2022     Ganglion 9/14/2015     HTN (hypertension) 09/14/2015     Hypertensive retinopathy of both eyes 04/19/2013     Nephrotic range proteinuria 06/11/2020     Obesity (BMI 30.0-34.9) 02/09/2020     Renal failure, unspecified chronicity 02/09/2020     Smoking 05/04/2016          Past Surgical History:   Procedure Laterality Date     CREATE FISTULA ARTERIOVENOUS UPPER EXTREMITY Left 4/13/2022    Procedure: CREATION, ARTERIOVENOUS FISTULA,LEFT  UPPER EXTREMITY;THROMBELECTOMY LEFT CEPHALIC VEIN.;  Surgeon: Sobeida Joya MD;  Location: [facility] Main OR     IR CVC NON TUNNEL LINE REMOVAL  4/28/2022     IR CVC NON TUNNEL PLACEMENT > 5 YRS  4/26/2022     IR CVC TUNNEL PLACEMENT > 5 YRS OF AGE  4/29/2022     IR DIALYSIS FISTULOGRAM LEFT  8/11/2022       ALLERGIES:  Patient has no known allergies.    MEDS:    Current Outpatient Medications:      amLODIPine (NORVASC) 10 MG tablet, Take 1 tablet (10 mg) by mouth daily, Disp: 90 tablet, Rfl: 3     aspirin (ASA) 81 MG EC tablet, Take 1 tablet (81 mg) by mouth daily, Disp: 90 tablet, Rfl: 0     Blood Glucose Monitoring Suppl (CONTOUR BLOOD GLUCOSE SYSTEM) w/Device KIT, 1 Device by Percutaneous route daily, Disp: 1 kit, Rfl: 0     calcitRIOL (ROCALTROL) 0.25 MCG capsule, Take 1 capsule (0.25 mcg) by mouth three times a week (Patient taking differently: Take 0.25 mcg by mouth three times a week Mon-Wed-Fri), Disp: 30 capsule, Rfl: 1     carvedilol (COREG) 25 MG tablet, Take 1 tablet (25 mg) by mouth 2 times daily (with meals), Disp: 180 tablet, Rfl: 1     Continuous Blood Gluc Sensor (FREESTYLE HARINI 2 SENSOR) [facility], 1 Application every 14 days, Disp: 2 each, Rfl: 3     glipiZIDE (GLUCOTROL XL) 2.5 MG 24 hr tablet, Take 1 tablet (2.5 mg) by mouth daily, Disp: 90 tablet, Rfl: 1     Glucose Blood (MARIA DEL CARMEN CONTOUR TEST) strip, Use to test blood sugars 2 times daily or as directed., Disp: 100 strip, Rfl: prn      calcium acetate (PHOSLO) 667 MG CAPS capsule, TAKE 1 CAPSULE BY MOUTH THREE TIMES DAILY WITH MEALS, Disp: , Rfl:     SOCIAL HABITS:    History   Smoking Status     Every Day     Packs/day: 0.25     Types: Cigarettes   Smokeless Tobacco     Never     Social History    Substance and Sexual Activity      Alcohol use: No      History   Drug Use No       FAMILY HISTORY:    Family History   Problem Relation Age of Onset     Diabetes Mother         50     Cerebrovascular Disease Mother      Hyperlipidemia Father      No Known Problems Sister      No Known Problems Brother      Cerebrovascular Disease Paternal Aunt        PE:  BP (!) 140/70   Pulse 81   Temp 99  F (37.2  C) (Tympanic)   Wt Readings from Last 1 Encounters:   09/22/22 184 lb 9.6 oz (83.7 kg)     There is no height or weight on file to calculate BMI.    EXAM:  GENERAL: This is a well-developed 37 year old female who appears her stated age  EYES: Grossly normal.  MOUTH: Buccal mucosa normal   CARDIAC: Normal   CHEST/LUNG: Clear to auscultation bilaterally  GASTROINTESINAL soft nontender nondistended  MUSCULOSKELETAL: Grossly normal and both lower extremities are intact.  HEME/LYMPH: No lymphedema  NEUROLOGIC: Focally intact, Alert and oriented x 3.   PSYCH: appropriate affect  INTEGUMENT: No open lesions or ulcers  Pulse Exam: Excellent palpable thrill over the AV fistula.          DIAGNOSTIC STUDIES:     Images:  US Ext Arterial Venous Dialys Acs Graft    Result Date: 11/9/2022  US EXTREMITY ARTERIAL VENOUS DIALYSIS ACCESS GRAFT  11/8/2022 3:10 PM CLINICAL HISTORY/INDICATION: Chronic kidney disease, unspecified CKD stage; Chronic kidney disease, stage IV (severe) (H) COMPARISON: Fistulogram from 8/11/2022 TECHNIQUE: Grayscale, color-flow, and spectral waveform analysis with velocities were performed of the dialysis access circuit. FINDINGS: The arterial inflow is patent with diameters ranging between 6.1 and 5.9 millimeters. The arterial anastomosis is  patent. The fistula is patent. Outflow diameters range 8.4-9.9 mm. Hyperdynamic low resistance waveforms throughout the venous outflow. Total outflow volume is 3537 mL/min.     IMPRESSION: Patent arteriovenous fistula. LOUIE AGUAYO MD   SYSTEM ID:  [id number]        LABS:      Sodium   Date Value Ref Range Status   09/22/2022 134 (L) 136 - 145 mmol/L Final   04/25/2022 134 (L) 136 - 145 mmol/L Final   04/24/2022 133 (L) 136 - 145 mmol/L Final   11/08/2013 136.0 133.0 - 144.0 mmol/L Final   09/26/2012 135.0 133.0 - 144.0 mmol/L Final     Urea Nitrogen   Date Value Ref Range Status   09/22/2022 33.8 (H) 6.0 - 20.0 mg/dL Final   04/25/2022 73 (H) 8 - 22 mg/dL Final   04/24/2022 71 (H) 8 - 22 mg/dL Final   04/23/2022 71 (H) 8 - 22 mg/dL Final   11/08/2013 7.0 5.0 - 24.0 mg/dL Final   09/26/2012 12.0 5.0 - 24.0 mg/dL Final     Hemoglobin   Date Value Ref Range Status   09/22/2022 11.8 11.7 - 15.7 g/dL Final   04/28/2022 7.6 (L) 11.7 - 15.7 g/dL Final   04/24/2022 8.5 (L) 11.7 - 15.7 g/dL Final     Platelet Count   Date Value Ref Range Status   09/22/2022 227 150 - 450 10e3/uL Final   04/28/2022 150 150 - 450 10e3/uL Final   04/24/2022 262 150 - 450 10e3/uL Final     BNP   Date Value Ref Range Status   04/23/2022 419 (H) 0 - 64 pg/mL Final       30 minutes spent on the day of encounter doing chart review, history and exam, documentation, and further activities as noted.         Sobeida Joya MD, [facility]  VASCULAR SURGERY

## 2022-11-16 ENCOUNTER — TELEPHONE (OUTPATIENT)
Dept: VASCULAR SURGERY | Facility: CLINIC | Age: 37
End: 2022-11-16

## 2022-11-16 NOTE — TELEPHONE ENCOUNTER
Pt left voicemail indicating she would like to reschedule her fistulagram that is scheduled on 11/17 with Dr. Pierce.  Pt did not say what date would work.  Writer cancelled fistulagram and LMTCB on pt's phone to reschedule.

## 2022-12-01 NOTE — TELEPHONE ENCOUNTER
Left message for patient to call back either way if she would like to reschedule her fistulogram with Dr. Pierce or not.

## 2023-03-03 DIAGNOSIS — E11.21 TYPE 2 DIABETES MELLITUS WITH DIABETIC NEPHROPATHY, WITHOUT LONG-TERM CURRENT USE OF INSULIN (H): ICD-10-CM

## 2023-03-03 RX ORDER — GLIPIZIDE 2.5 MG/1
2.5 TABLET, EXTENDED RELEASE ORAL DAILY
Qty: 90 TABLET | Refills: 0 | Status: SHIPPED | OUTPATIENT
Start: 2023-03-03 | End: 2023-08-01

## 2023-04-29 ENCOUNTER — HEALTH MAINTENANCE LETTER (OUTPATIENT)
Age: 38
End: 2023-04-29

## 2023-07-21 DIAGNOSIS — E11.21 TYPE 2 DIABETES MELLITUS WITH DIABETIC NEPHROPATHY, WITHOUT LONG-TERM CURRENT USE OF INSULIN (H): ICD-10-CM

## 2023-07-21 RX ORDER — GLIPIZIDE 2.5 MG/1
2.5 TABLET, EXTENDED RELEASE ORAL DAILY
Qty: 90 TABLET | Refills: 0 | OUTPATIENT
Start: 2023-07-21

## 2023-07-21 NOTE — TELEPHONE ENCOUNTER
FYI - Status Update    Who is Calling: patient    Update:  Incoming call from patient wanting refill on her GlipiZide, took the last pill on 7/19. Please advise     Does caller want a call/response back: Yes     Could we send this information to you in [employer] or would you prefer to receive a phone call?:   Patient would prefer a phone call   Okay to leave a detailed message?: Yes at Cell number on file:    Telephone Information:   Mobile [phone]

## 2023-07-21 NOTE — TELEPHONE ENCOUNTER
"Routing refill request to provider for review/approval because:  Labs out of range:  Creatinine  Labs not current:  A1c  Patient needs to be seen because it has been more than 6 months since last office visit.    Last Written Prescription Date: 3/3/23  Last Fill Quantity: 90, # refills: 0  Last office visit provider: Anita 9/22/22    Requested Prescriptions   Pending Prescriptions Disp Refills    glipiZIDE (GLUCOTROL XL) 2.5 MG 24 hr tablet 90 tablet 0     Sig: Take 1 tablet (2.5 mg) by mouth daily       Sulfonylurea Agents Failed - 7/21/2023 10:24 AM        Failed - Patient has documented A1c within the specified period of time.     If HgbA1C is 8 or greater, it needs to be on file within the past 3 months.  If less than 8, must be on file within the past 6 months.     Recent Labs   Lab Test 09/22/22  0813   A1C 7.5*             Failed - Patient has a recent creatinine (normal) within the past 12 mos.     Recent Labs   Lab Test 09/22/22  0813   CR 6.88*       Ok to refill medication if creatinine is low          Failed - Recent (6 mo) or future (30 days) visit within the authorizing provider's specialty     Patient had office visit in the last 6 months or has a visit in the next 30 days with authorizing provider or within the authorizing provider's specialty.  See \"Patient Info\" tab in inbasket, or \"Choose Columns\" in Meds & Orders section of the refill encounter.            Passed - Medication is active on med list        Passed - Patient is age 18 or older        Passed - No active pregnancy on record        Passed - Patient has not had a positive pregnancy test within the past 12 mos.             Jenniffer Salcedo RN 07/21/23 10:45 AM    "

## 2023-07-28 ENCOUNTER — TELEPHONE (OUTPATIENT)
Dept: FAMILY MEDICINE | Facility: CLINIC | Age: 38
End: 2023-07-28
Payer: MEDICARE

## 2023-07-28 DIAGNOSIS — E11.21 TYPE 2 DIABETES MELLITUS WITH DIABETIC NEPHROPATHY, WITHOUT LONG-TERM CURRENT USE OF INSULIN (H): ICD-10-CM

## 2023-07-28 NOTE — TELEPHONE ENCOUNTER
Reason for Call:  Appointment Request    Patient requesting this type of appt: Chronic Diease Management/Medication/Follow-Up    Requested provider: No Ref-Primary, Physician    Reason patient unable to be scheduled: Not within requested timeframe    When does patient want to be seen/preferred time: 1-2 days    Comments: Patient needs a follow up med check for diabetes and needs a refill asap but an appointment is needed first.  There is no openings until Septometer.     Could we send this information to you in [employer] or would you prefer to receive a phone call?:   Patient would prefer a phone call   Okay to leave a detailed message?: Yes at Cell number on file:    Telephone Information:   Mobile [phone]       Call taken on 7/28/2023 at 7:38 AM by Allie Can

## 2023-08-01 RX ORDER — GLIPIZIDE 2.5 MG/1
2.5 TABLET, EXTENDED RELEASE ORAL DAILY
Qty: 30 TABLET | Refills: 0 | Status: SHIPPED | OUTPATIENT
Start: 2023-08-01 | End: 2023-09-05

## 2023-08-01 NOTE — TELEPHONE ENCOUNTER
Please asked patient if her hemoglobin is being checked with dialysis and if her A1c has been checked.  Glipizide can cause hypoglycemia although she is on very low-dose.  She should make sure she also checks her blood sugar.  I will see her soon.  I am only sending 1 month refill.    Toby Howard MD

## 2023-08-01 NOTE — TELEPHONE ENCOUNTER
Patient Returning Call    Reason for call:  Returning Maria Fernanda's call    Information relayed to patient:  Yes, read Dr. Howard's notation. Patient states that she is already scheduled with Dr. Howard on 9/19.     Patient has additional questions:  Yes    What are your questions/concerns:  Wants to know if Dr. Howard would refill her med (GlipiZide) since she's scheduled.     Who does the patient want to speak with:  RN    Is an  needed?:  No      Could we send this information to you in [employer] or would you prefer to receive a phone call?:   Patient would prefer a phone call   Okay to leave a detailed message?: Yes at Cell number on file:    Telephone Information:   Mobile [phone]

## 2023-08-01 NOTE — TELEPHONE ENCOUNTER
Patient scheduled on 08/14/23.   Patient requesting glipizide to be refilled.   Rx pended, please advise.

## 2023-08-01 NOTE — TELEPHONE ENCOUNTER
Patient states that she thinks they check her A1c but is unsure. She states that they don't ever saying anything bad about her blood sugars.   Patient will review this with you at her upcoming appt.

## 2023-08-08 NOTE — TELEPHONE ENCOUNTER
Incoming call from patient stating that pharmacy is waiting on Dr. Howard to approve her refill (glipiZide). States she needs it today, and wants it called in.

## 2023-08-08 NOTE — TELEPHONE ENCOUNTER
Spoke with [facility], they just wanted clarification on if the glipizide would be for a 3-month supply.  Explained that Dr. Howard intends to only do 30-day supply until patient is seen.  [facility] will reach out to patient.    Reach out to Alysia and inform her that glipizide should be available at patient's pharmacy.    Eric Sue RN     [facility]

## 2023-09-05 DIAGNOSIS — E11.21 TYPE 2 DIABETES MELLITUS WITH DIABETIC NEPHROPATHY, WITHOUT LONG-TERM CURRENT USE OF INSULIN (H): ICD-10-CM

## 2023-09-06 RX ORDER — GLIPIZIDE 2.5 MG/1
2.5 TABLET, EXTENDED RELEASE ORAL DAILY
Qty: 30 TABLET | Refills: 0 | Status: SHIPPED | OUTPATIENT
Start: 2023-09-06 | End: 2024-07-16

## 2023-09-26 ENCOUNTER — OFFICE VISIT (OUTPATIENT)
Dept: FAMILY MEDICINE | Facility: CLINIC | Age: 38
End: 2023-09-26
Payer: MEDICARE

## 2023-09-26 VITALS
RESPIRATION RATE: 16 BRPM | HEIGHT: 60 IN | HEART RATE: 84 BPM | DIASTOLIC BLOOD PRESSURE: 68 MMHG | BODY MASS INDEX: 37.62 KG/M2 | WEIGHT: 191.6 LBS | SYSTOLIC BLOOD PRESSURE: 137 MMHG | OXYGEN SATURATION: 95 %

## 2023-09-26 DIAGNOSIS — E11.21 TYPE 2 DIABETES MELLITUS WITH DIABETIC NEPHROPATHY, WITHOUT LONG-TERM CURRENT USE OF INSULIN (H): Primary | ICD-10-CM

## 2023-09-26 DIAGNOSIS — R06.83 SNORING: ICD-10-CM

## 2023-09-26 DIAGNOSIS — Z99.2 ESRD (END STAGE RENAL DISEASE) ON DIALYSIS (H): ICD-10-CM

## 2023-09-26 DIAGNOSIS — N18.6 ESRD (END STAGE RENAL DISEASE) ON DIALYSIS (H): ICD-10-CM

## 2023-09-26 PROBLEM — N18.5 CKD (CHRONIC KIDNEY DISEASE) STAGE 5, GFR LESS THAN 15 ML/MIN (H): Status: RESOLVED | Noted: 2022-04-04 | Resolved: 2023-09-26

## 2023-09-26 PROBLEM — R80.9 MICROALBUMINURIA: Status: RESOLVED | Noted: 2020-02-09 | Resolved: 2023-09-26

## 2023-09-26 LAB
CHOLEST SERPL-MCNC: 146 MG/DL
HBA1C MFR BLD: 5.5 % (ref 0–5.6)
HDLC SERPL-MCNC: 32 MG/DL
HOLD SPECIMEN: NORMAL
LDLC SERPL CALC-MCNC: 81 MG/DL
NONHDLC SERPL-MCNC: 114 MG/DL
TRIGL SERPL-MCNC: 164 MG/DL

## 2023-09-26 PROCEDURE — 80061 LIPID PANEL: CPT | Performed by: FAMILY MEDICINE

## 2023-09-26 PROCEDURE — 36415 COLL VENOUS BLD VENIPUNCTURE: CPT | Performed by: FAMILY MEDICINE

## 2023-09-26 PROCEDURE — 99214 OFFICE O/P EST MOD 30 MIN: CPT | Performed by: FAMILY MEDICINE

## 2023-09-26 PROCEDURE — 83036 HEMOGLOBIN GLYCOSYLATED A1C: CPT | Performed by: FAMILY MEDICINE

## 2023-09-26 NOTE — PROGRESS NOTES
Assessment & Plan     ICD-10-CM    1. Type 2 diabetes mellitus with diabetic nephropathy, without long-term current use of insulin (H)  E11.21 HEMOGLOBIN A1C     Lipid panel reflex to direct LDL Fasting     HEMOGLOBIN A1C     Lipid panel reflex to direct LDL Fasting      2. ESRD (end stage renal disease) on dialysis (H)  N18.6 Albumin Random Urine Quantitative with Creat Ratio    Z99.2 Albumin Random Urine Quantitative with Creat Ratio      3. Snoring  R06.83 Adult Sleep Eval & Management  Referral        Patient with hypertension, type 2 diabetes and end-stage renal failure on dialysis is here today for follow-up.  She goes to [facility] dialysis.  She is not on the transplant list yet due to previous missed appointment.  Her diabetes is well controlled and she has been off her glipizide for a bit.  I have asked her to stop taking the glipizide.  Recheck A1c and then plan if she needs to be on glipizide still.  Blood pressure is under fair control.  She does have a mole on her forehead that she would like to be removed.  She will follow-up for healthcare maintenance in removal of skin lesion in 3 months time.  She does report heroic snoring and refer to sleep study.     Nicotine/Tobacco Cessation:  She reports that she has been smoking cigarettes. She has been smoking an average of .25 packs per day. She has never used smokeless tobacco.  Nicotine/Tobacco Cessation Plan:         BMI:   Estimated body mass index is 37.42 kg/m  as calculated from the following:    Height as of this encounter: 1.524 m (5').    Weight as of this encounter: 86.9 kg (191 lb 9.6 oz).       Patient Instructions   STOP GLIPIZIDE    RECHECK A1C IN 3 MONTHS    Toby Howard MD  [facility]    Frances Hatfield is a 38 year old, presenting for the following health issues:  Diabetes (Pt is fasting today, no concerns. ) and Sleep Problem (Sleep issues lately, for a couple weeks, has been told she is  snoring a lot also. Waking up almost every hour. )        9/26/2023     9:34 AM   Additional Questions   Roomed by Omaira Moreno CMA   Accompanied by N/A       History of Present Illness       Diabetes:   She presents for follow up of diabetes.    She is not checking blood glucose.         She has no concerns regarding her diabetes at this time.    The patient has had a diabetic eye exam in the last 12 months. Eye exam performed on not sure. Location of last eye exam [facility] [place].        She eats 0-1 servings of fruits and vegetables daily.She exercises with enough effort to increase her heart rate 9 or less minutes per day.  She exercises with enough effort to increase her heart rate 3 or less days per week.   She is taking medications regularly.     Patient Active Problem List   Diagnosis    Other specified visual disturbances    Tinnitus    Morbid obesity (H)    HTN (hypertension)    Hypertensive retinopathy of both eyes    Smoking    ESRD (end stage renal disease) on dialysis (H)    Secondary hyperparathyroidism of renal origin (H)    Tobacco dependence     Current Outpatient Medications   Medication    amLODIPine (NORVASC) 10 MG tablet    aspirin (ASA) 81 MG EC tablet    Blood Glucose Monitoring Suppl (CONTOUR BLOOD GLUCOSE SYSTEM) w/Device KIT    calcitRIOL (ROCALTROL) 0.25 MCG capsule    calcium acetate (PHOSLO) 667 MG CAPS capsule    carvedilol (COREG) 25 MG tablet    Continuous Blood Gluc Sensor (FREESTYLE HARINI 2 SENSOR) MISC    glipiZIDE (GLUCOTROL XL) 2.5 MG 24 hr tablet    Glucose Blood (MARIA DEL CARMEN CONTOUR TEST) strip     No current facility-administered medications for this visit.         Review of Systems   Constitutional, HEENT, cardiovascular, pulmonary, gi and gu systems are negative, except as otherwise noted.      Objective    /68 (BP Location: Left arm, Patient Position: Sitting, Cuff Size: Adult Large)   Pulse 84   Resp 16   Ht 1.524 m (5')   Wt 86.9 kg (191 lb 9.6 oz)   SpO2 95%    BMI 37.42 kg/m    Body mass index is 37.42 kg/m .  Physical Exam   GENERAL: healthy, alert and no distress  SKIN: Large mole - noted on forehead      Results for orders placed or performed in visit on 09/26/23 (from the past 24 hour(s))   HEMOGLOBIN A1C   Result Value Ref Range    Hemoglobin A1C 5.5 0.0 - 5.6 %   Extra Tube    Narrative    The following orders were created for panel order Extra Tube.  Procedure                               Abnormality         Status                     ---------                               -----------         ------                     Extra Green Top (Lithium...[[id number]]                      In process                   Please view results for these tests on the individual orders.

## 2023-10-09 NOTE — PATIENT INSTRUCTIONS
I want you to go to ER    You have declined medical  transport    Your BP is very high today.    Despite repeat check it remains high    You may need medications and then be monitored until it is in reasonable range. This cannot be pursued in clinic setting                 none

## 2023-10-23 ENCOUNTER — TELEPHONE (OUTPATIENT)
Dept: FAMILY MEDICINE | Facility: CLINIC | Age: 38
End: 2023-10-23
Payer: MEDICARE

## 2023-10-24 ENCOUNTER — TRANSFERRED RECORDS (OUTPATIENT)
Dept: HEALTH INFORMATION MANAGEMENT | Facility: CLINIC | Age: 38
End: 2023-10-24
Payer: MEDICARE

## 2023-10-24 LAB — RETINOPATHY: POSITIVE

## 2024-04-27 ENCOUNTER — HEALTH MAINTENANCE LETTER (OUTPATIENT)
Age: 39
End: 2024-04-27

## 2024-06-18 ENCOUNTER — E-VISIT (OUTPATIENT)
Dept: URGENT CARE | Facility: CLINIC | Age: 39
End: 2024-06-18
Payer: MEDICARE

## 2024-06-18 DIAGNOSIS — R05.1 ACUTE COUGH: Primary | ICD-10-CM

## 2024-06-18 PROCEDURE — 99207 PR NON-BILLABLE SERV PER CHARTING: CPT | Performed by: FAMILY MEDICINE

## 2024-06-18 NOTE — PATIENT INSTRUCTIONS
Dear Alysia Hernandez,    We are sorry you are not feeling well. Based on the responses you provided, it is recommended that you be seen in-person in urgent care so we can better evaluate your symptoms. Please click here to find the nearest urgent care location to you.   You will not be charged for this Visit. Thank you for trusting us with your care.    MARGA VELASQUEZ CNP

## 2024-07-06 ENCOUNTER — HEALTH MAINTENANCE LETTER (OUTPATIENT)
Age: 39
End: 2024-07-06

## 2024-07-16 ENCOUNTER — OFFICE VISIT (OUTPATIENT)
Dept: FAMILY MEDICINE | Facility: CLINIC | Age: 39
End: 2024-07-16
Payer: MEDICARE

## 2024-07-16 VITALS
OXYGEN SATURATION: 98 % | RESPIRATION RATE: 16 BRPM | HEART RATE: 74 BPM | DIASTOLIC BLOOD PRESSURE: 67 MMHG | WEIGHT: 181.2 LBS | BODY MASS INDEX: 35.57 KG/M2 | SYSTOLIC BLOOD PRESSURE: 130 MMHG | HEIGHT: 60 IN

## 2024-07-16 DIAGNOSIS — N18.6 ESRD (END STAGE RENAL DISEASE) ON DIALYSIS (H): ICD-10-CM

## 2024-07-16 DIAGNOSIS — I10 ESSENTIAL HYPERTENSION: ICD-10-CM

## 2024-07-16 DIAGNOSIS — E11.21 TYPE 2 DIABETES MELLITUS WITH DIABETIC NEPHROPATHY, WITHOUT LONG-TERM CURRENT USE OF INSULIN (H): Primary | ICD-10-CM

## 2024-07-16 DIAGNOSIS — Z99.2 ESRD (END STAGE RENAL DISEASE) ON DIALYSIS (H): ICD-10-CM

## 2024-07-16 DIAGNOSIS — R09.82 POSTNASAL DRIP: ICD-10-CM

## 2024-07-16 LAB
HBA1C MFR BLD: 6.2 % (ref 0–5.6)
HGB BLD-MCNC: 10.7 G/DL (ref 11.7–15.7)

## 2024-07-16 PROCEDURE — 99214 OFFICE O/P EST MOD 30 MIN: CPT | Performed by: FAMILY MEDICINE

## 2024-07-16 PROCEDURE — 80048 BASIC METABOLIC PNL TOTAL CA: CPT | Performed by: FAMILY MEDICINE

## 2024-07-16 PROCEDURE — 83036 HEMOGLOBIN GLYCOSYLATED A1C: CPT | Performed by: FAMILY MEDICINE

## 2024-07-16 PROCEDURE — 36415 COLL VENOUS BLD VENIPUNCTURE: CPT | Performed by: FAMILY MEDICINE

## 2024-07-16 PROCEDURE — 85018 HEMOGLOBIN: CPT | Performed by: FAMILY MEDICINE

## 2024-07-16 RX ORDER — LANCETS
EACH MISCELLANEOUS
Qty: 100 EACH | Refills: 6 | Status: SHIPPED | OUTPATIENT
Start: 2024-07-16

## 2024-07-16 RX ORDER — LISINOPRIL 20 MG/1
20 TABLET ORAL DAILY
COMMUNITY
Start: 2024-07-09 | End: 2024-07-16

## 2024-07-16 RX ORDER — CLONIDINE HYDROCHLORIDE 0.2 MG/1
0.1 TABLET ORAL 2 TIMES DAILY
COMMUNITY
Start: 2024-07-16

## 2024-07-16 RX ORDER — CLONIDINE HYDROCHLORIDE 0.2 MG/1
0.2 TABLET ORAL 2 TIMES DAILY
COMMUNITY
Start: 2024-07-09 | End: 2024-07-16

## 2024-07-16 RX ORDER — FLUTICASONE PROPIONATE 50 MCG
1 SPRAY, SUSPENSION (ML) NASAL DAILY
Qty: 15.8 ML | Refills: 0 | Status: SHIPPED | OUTPATIENT
Start: 2024-07-16

## 2024-07-16 RX ORDER — HYDRALAZINE HYDROCHLORIDE 25 MG/1
1 TABLET, FILM COATED ORAL
COMMUNITY
Start: 2024-05-09 | End: 2024-07-16

## 2024-07-16 RX ORDER — BENZONATATE 100 MG/1
200 CAPSULE ORAL 3 TIMES DAILY PRN
COMMUNITY
End: 2024-07-16

## 2024-07-16 ASSESSMENT — ENCOUNTER SYMPTOMS: COUGH: 1

## 2024-07-16 NOTE — PROGRESS NOTES
Assessment & Plan     Type 2 diabetes mellitus with diabetic nephropathy, without long-term current use of insulin (H)  Fluctuating A1c, no longer on glipizide due to risk of hypoglycemia.  Recheck A1c today and will give her testing supplies.  Patient may benefit from CGM since hemodialysis may be affecting the accuracy of A1c results.  Patient notes some neuropathy symptoms, but monofilament testing was normal today.  Recommend paring down trophic changes.  Up-to-date on eye exam.  - HEMOGLOBIN A1C; Future  - blood glucose monitoring (NO BRAND SPECIFIED) meter device kit; Use to test blood sugar 1 times daily or as directed. Preferred blood glucose meter OR supplies to accompany: Blood Glucose Monitor Brands: per insurance.  - blood glucose (NO BRAND SPECIFIED) test strip; Use to test blood sugar 1 times daily or as directed. To accompany: Blood Glucose Monitor Brands: per insurance.  - thin (NO BRAND SPECIFIED) lancets; Use with lanceting device. To accompany: Blood Glucose Monitor Brands: per insurance.    Essential hypertension  Elevated today but has not taken her BP medications yet.  Antihypertensives being managed by nephrology.  - Basic metabolic panel  (Ca, Cl, CO2, Creat, Gluc, K, Na, BUN); Future    ESRD (end stage renal disease) on dialysis (H)  Undergoes dialysis Monday Wednesday Friday, follows with Dr. Miller.   - Hemoglobin; Future    Postnasal drip  Initially thought cough was secondary to lisinopril, but this was only briefly started and cough has persisted.  Suspect postnasal drip based on exam so recommend fluticasone.  Reviewed proper administration technique.  - fluticasone (FLONASE) 50 MCG/ACT nasal spray; Spray 1 spray into both nostrils daily    Frances Hatfield is a 39 year old, presenting for the following health issues:  Diabetes (Diabetic check- pt is NOT fasting today. /) and Cough (Cough for about a month, dry cough. Pt has not tried anything OTC.)        7/16/2024     2:44 PM    Additional Questions   Roomed by Omaira Moreno CMA     History of Present Illness       Reason for visit:  Cough and diabetes checkup  Symptom onset:  More than a month  Symptoms include:  Dry cough  Symptom intensity:  Moderate  Symptom progression:  Staying the same  Had these symptoms before:  Yes  Has tried/received treatment for these symptoms:  Yes  Previous treatment was successful:  Yes  Prior treatment description:  Antibiotics and tesslon pearls    She eats 0-1 servings of fruits and vegetables daily.She consumes 2 sweetened beverage(s) daily.She exercises with enough effort to increase her heart rate 9 or less minutes per day.  She exercises with enough effort to increase her heart rate 3 or less days per week.   She is taking medications regularly.     Over 1 month of cough. Coughs so hard she has urinary incontinence. Cough started after lisinopril but cough persisted after stopping lisinopril. Cough with losartan as well. No congestion, rhinorrhea.  Dry cough worse in the evenings and when turning on her left side.  Often needs to cough up mucus for it to stop.  Has been using cough suppressants..    Hemoglobin A1C   Date Value Ref Range Status   09/26/2023 5.5 0.0 - 5.6 % Final     Comment:     Normal <5.7%   Prediabetes 5.7-6.4%    Diabetes 6.5% or higher     Note: Adopted from ADA consensus guidelines.   09/22/2022 7.5 (H) 0.0 - 5.6 % Final   04/04/2022 5.0 0.0 - 5.6 % Final     Comment:     Normal <5.7%   Prediabetes 5.7-6.4%    Diabetes 6.5% or higher     Note: Adopted from ADA consensus guidelines.   11/08/2013 11.8 (H) 4.1 - 5.7 % Final   04/19/2013 11.6 (H) 4.1 - 5.7 % Final   09/26/2012 9.4 (H) 4.1 - 5.7 % Final         Continues on dialysis Monday Wednesday Friday.  Follows with nephrology monthly.    Currently unemployed, looking for a job.    BP at home in the 130s to 160s systolic.  Did not take her BP medications today.      Objective    BP (!) 159/78 (BP Location: Left arm, Patient  Position: Sitting, Cuff Size: Adult Large)   Pulse 77   Resp 16   Ht 1.524 m (5')   Wt 82.2 kg (181 lb 3.2 oz)   SpO2 98%   BMI 35.39 kg/m    Body mass index is 35.39 kg/m .  Physical Exam   GENERAL: alert and no distress  EYES: Eyes grossly normal to inspection, PERRL and conjunctivae and sclerae normal  HENT: ear canals and TM's normal, nose and mouth without ulcers or lesions, postnasal drip  NECK: no adenopathy, no asymmetry, masses, or scars  RESP: lungs clear to auscultation - no rales, rhonchi or wheezes  CV: regular rate and rhythm, normal S1 S2, no S3 or S4, no murmur  PSYCH: mentation appears normal, affect normal/bright  Foot exam: normal DP and PT pulses, normal sensory exam, normal monofilament exam, and trophic changes bottom of left great toe and bottom of left lateral foot              Signed Electronically by: Farheen Casiano DO

## 2024-07-17 LAB
ANION GAP SERPL CALCULATED.3IONS-SCNC: 18 MMOL/L (ref 7–15)
BUN SERPL-MCNC: 36.5 MG/DL (ref 6–20)
CALCIUM SERPL-MCNC: 10.5 MG/DL (ref 8.8–10.4)
CHLORIDE SERPL-SCNC: 88 MMOL/L (ref 98–107)
CREAT SERPL-MCNC: 8.42 MG/DL (ref 0.51–0.95)
EGFRCR SERPLBLD CKD-EPI 2021: 6 ML/MIN/1.73M2
GLUCOSE SERPL-MCNC: 192 MG/DL (ref 70–99)
HCO3 SERPL-SCNC: 25 MMOL/L (ref 22–29)
POTASSIUM SERPL-SCNC: 4.8 MMOL/L (ref 3.4–5.3)
SODIUM SERPL-SCNC: 131 MMOL/L (ref 135–145)

## 2024-11-20 ENCOUNTER — TRANSFERRED RECORDS (OUTPATIENT)
Dept: MULTI SPECIALTY CLINIC | Facility: CLINIC | Age: 39
End: 2024-11-20

## 2024-11-20 LAB — RETINOPATHY: NORMAL

## 2024-12-18 NOTE — NURSING NOTE
Discharge Summary - Hospitalist   Name: Randal Romeo 60 y.o. male I MRN: [id number]  Unit/Bed#: S -01 I Date of Admission: 12/15/2024   Date of Service: 12/18/2024 I Hospital Day: 2     Assessment & Plan  Sepsis (HCC)  Patient met sepsis criteria time of admission with tachycardia and tachypnea  Source likely bacteremia with bacteriuria  Blood cultures growing gram-negative rods-Enterobacter  Urine culture also growing gram-negative rods-Enterobacter  Stable for discharge   Levaquin through 12/22   Appreciate ID input     Bacteremia due to Enterobacter species  Patient remains AFVSS. WBC 11  Suspected secondary to acute cystitis   Continue Levaquin through 12/22  Black box warning discussed  Infectious disease following    Asymptomatic bacteriuria  Now with evidence of GNR bacteremia   Changed to PO Levaquin through 12/22  Appreciate ID input    Chronic Obstructive Pulmonary Disease  COPD on Breztri  Not in acute exacerbation  Continue home regimen   Malignant neoplasm of upper lobe of right lung (HCC)  Patient had previous VATS in January 2021 for right lung mass  Follows with pulmonology as outpatient.    Gastroesophageal reflux disease without esophagitis  Continue famotidine  MARIANA (obstructive sleep apnea)  Obstructive sleep apnea on CPAP  Continue CPAP at home   Status post placement of cardiac pacemaker  Status post cardiac pacemaker due to sinus pauses which was done on 11/27/2024.  Missed his outpatient follow-up appointment today, will need to reschedule  Spoke with inpatient cardiology, approval to remove Steri-Strips, incision well-approximated     Medical Problems       Resolved Problems  Date Reviewed: 12/18/2024   None       Discharging Physician / Practitioner: Pieter Méndez PA-C  PCP: RAY Redmond  Admission Date:   Admission Orders (From admission, onward)       Ordered        12/16/24 1452  INPATIENT ADMISSION  Once            12/15/24 1653  Place in Observation  Once               Faxed nursing order Sandro carrillo to start using fistula.    "            Discharge Date: 12/18/24    Consultations During Hospital Stay:  Infectious Disease - Dr. Herbert     Procedures Performed:   CXR  CTA PE Study  CT Abdomen Pelvis     Significant Findings / Test Results:   CXR: No acute pulmonary disease   CTA PE Study: No evidence for pulmonary embolism. Mild emphysematous change.   CT Abdomen/Pelvis: No CT evidence of an infectious etiology within the abdomen or pelvis     Incidental Findings:   None     Test Results Pending at Discharge (will require follow up):   None     Outpatient Tests Requested:  None    Complications:  None    Reason for Admission: Sepsis, UTI, Bacteremia     Hospital Course:   Randal Romeo is a 60 y.o. male patient who originally presented to the hospital on 12/15/2024 due to chills. Septic work up was undergone in the ED. CXR and CTA PE Study was negative. UA was positive and he was treated for presumptive UTI. Due to sepsis criteria antibiotics were kept broad with Cefepime. He was admitted and ID was consulted. Urine and blood cultures grew Enterobacter. Sensitivities showed that Levaquin was an option and he will be on this until 12/22. He was discharged home in stable condition.     Hospital Course: No notes on file      Please see above list of diagnoses and related plan for additional information.     Condition at Discharge: stable    Discharge Day Visit / Exam:   Subjective:  Patient reports feeling well today. Denies urinary complaints. Denies fevers or chills. Wants to go home.   Vitals: Blood Pressure: 127/81 (12/18/24 0724)  Pulse: 60 (12/18/24 0724)  Temperature: 97.7 °F (36.5 °C) (12/18/24 0724)  Temp Source: Oral (12/16/24 1514)  Respirations: 16 (12/17/24 1530)  Height: 5' 10.5\" (179.1 cm) (12/15/24 1900)  Weight - Scale: 122 kg (268 lb 15.4 oz) (12/15/24 1630)  SpO2: 96 % (12/18/24 0724)  Physical Exam  Constitutional:       General: He is not in acute distress.     Appearance: Normal appearance. He is normal weight. He is not " ill-appearing or diaphoretic.   HENT:      Head: Normocephalic and atraumatic.      Mouth/Throat:      Mouth: Mucous membranes are moist.   Eyes:      General: No scleral icterus.     Pupils: Pupils are equal, round, and reactive to light.   Cardiovascular:      Rate and Rhythm: Normal rate and regular rhythm.      Pulses: Normal pulses.      Heart sounds: Normal heart sounds, S1 normal and S2 normal. No murmur heard.     No systolic murmur is present.      No diastolic murmur is present.      No gallop. No S3 or S4 sounds.   Pulmonary:      Effort: Pulmonary effort is normal. No accessory muscle usage or respiratory distress.      Breath sounds: Normal breath sounds. No stridor. No wheezing, rhonchi or rales.   Chest:      Chest wall: No tenderness.   Abdominal:      General: Bowel sounds are normal. There is no distension.      Palpations: Abdomen is soft.      Tenderness: There is no abdominal tenderness. There is no guarding.   Musculoskeletal:      Right lower leg: No edema.      Left lower leg: No edema.   Skin:     General: Skin is warm and dry.      Coloration: Skin is not jaundiced.   Neurological:      General: No focal deficit present.      Mental Status: He is alert. Mental status is at baseline.      Motor: No tremor or seizure activity.   Psychiatric:         Behavior: Behavior is cooperative.          Discussion with Family: Updated  (wife) via phone.    Discharge instructions/Information to patient and family:   See after visit summary for information provided to patient and family.      Provisions for Follow-Up Care:  See after visit summary for information related to follow-up care and any pertinent home health orders.      Mobility at time of Discharge:   Basic Mobility Inpatient Raw Score: 24  JH-HLM Goal: 8: Walk 250 feet or more  JH-HLM Achieved: 8: Walk 250 feet ot more  HLM Goal achieved. Continue to encourage appropriate mobility.     Disposition:   Home    Planned Readmission:  None    Discharge Medications:  See after visit summary for reconciled discharge medications provided to patient and/or family.      Administrative Statements   Discharge Statement:  I have spent a total time of 45 minutes in caring for this patient on the day of the visit/encounter. >30 minutes of time was spent on: Diagnostic results, Instructions for management, Impressions, Counseling / Coordination of care, Documenting in the medical record, Reviewing / ordering tests, medicine, procedures  , and Communicating with other healthcare professionals .    **Please Note: This note may have been constructed using a voice recognition system**

## 2025-01-07 ENCOUNTER — OFFICE VISIT (OUTPATIENT)
Dept: FAMILY MEDICINE | Facility: CLINIC | Age: 40
End: 2025-01-07
Payer: MEDICARE

## 2025-01-07 VITALS
SYSTOLIC BLOOD PRESSURE: 150 MMHG | DIASTOLIC BLOOD PRESSURE: 74 MMHG | TEMPERATURE: 98.7 F | RESPIRATION RATE: 16 BRPM | WEIGHT: 175 LBS | OXYGEN SATURATION: 100 % | HEART RATE: 66 BPM | BODY MASS INDEX: 34.18 KG/M2

## 2025-01-07 DIAGNOSIS — Z99.2 ESRD (END STAGE RENAL DISEASE) ON DIALYSIS (H): ICD-10-CM

## 2025-01-07 DIAGNOSIS — I10 PRIMARY HYPERTENSION: ICD-10-CM

## 2025-01-07 DIAGNOSIS — N18.6 ESRD (END STAGE RENAL DISEASE) ON DIALYSIS (H): ICD-10-CM

## 2025-01-07 DIAGNOSIS — E11.21 TYPE 2 DIABETES MELLITUS WITH DIABETIC NEPHROPATHY, WITHOUT LONG-TERM CURRENT USE OF INSULIN (H): Primary | ICD-10-CM

## 2025-01-07 DIAGNOSIS — L98.9 DERMATOSIS: ICD-10-CM

## 2025-01-07 DIAGNOSIS — L81.9 PIGMENTATION ABNORMALITY OF SKIN: ICD-10-CM

## 2025-01-07 PROBLEM — E87.5 HYPERKALEMIA: Status: ACTIVE | Noted: 2024-03-26

## 2025-01-07 PROBLEM — D84.9 IMMUNOLOGIC DEFICIENCY SYNDROME: Status: ACTIVE | Noted: 2024-03-26

## 2025-01-07 PROBLEM — R11.2 INTRACTABLE NAUSEA AND VOMITING: Status: ACTIVE | Noted: 2024-07-18

## 2025-01-07 LAB
BASOPHILS # BLD AUTO: 0 10E3/UL (ref 0–0.2)
BASOPHILS NFR BLD AUTO: 0 %
EOSINOPHIL # BLD AUTO: 0.2 10E3/UL (ref 0–0.7)
EOSINOPHIL NFR BLD AUTO: 3 %
ERYTHROCYTE [DISTWIDTH] IN BLOOD BY AUTOMATED COUNT: 16.2 % (ref 10–15)
EST. AVERAGE GLUCOSE BLD GHB EST-MCNC: 131 MG/DL
HBA1C MFR BLD: 6.2 % (ref 0–5.6)
HCT VFR BLD AUTO: 39.5 % (ref 35–47)
HGB BLD-MCNC: 12.1 G/DL (ref 11.7–15.7)
HOLD SPECIMEN: NORMAL
IMM GRANULOCYTES # BLD: 0 10E3/UL
IMM GRANULOCYTES NFR BLD: 0 %
LYMPHOCYTES # BLD AUTO: 1.1 10E3/UL (ref 0.8–5.3)
LYMPHOCYTES NFR BLD AUTO: 13 %
MCH RBC QN AUTO: 26.9 PG (ref 26.5–33)
MCHC RBC AUTO-ENTMCNC: 30.6 G/DL (ref 31.5–36.5)
MCV RBC AUTO: 88 FL (ref 78–100)
MONOCYTES # BLD AUTO: 0.8 10E3/UL (ref 0–1.3)
MONOCYTES NFR BLD AUTO: 10 %
NEUTROPHILS # BLD AUTO: 6.1 10E3/UL (ref 1.6–8.3)
NEUTROPHILS NFR BLD AUTO: 73 %
PLATELET # BLD AUTO: 258 10E3/UL (ref 150–450)
RBC # BLD AUTO: 4.49 10E6/UL (ref 3.8–5.2)
WBC # BLD AUTO: 8.3 10E3/UL (ref 4–11)

## 2025-01-07 PROCEDURE — G2211 COMPLEX E/M VISIT ADD ON: HCPCS | Performed by: FAMILY MEDICINE

## 2025-01-07 PROCEDURE — 80061 LIPID PANEL: CPT | Performed by: FAMILY MEDICINE

## 2025-01-07 PROCEDURE — 85025 COMPLETE CBC W/AUTO DIFF WBC: CPT | Performed by: FAMILY MEDICINE

## 2025-01-07 PROCEDURE — 83036 HEMOGLOBIN GLYCOSYLATED A1C: CPT | Performed by: FAMILY MEDICINE

## 2025-01-07 PROCEDURE — 36415 COLL VENOUS BLD VENIPUNCTURE: CPT | Performed by: FAMILY MEDICINE

## 2025-01-07 PROCEDURE — 99214 OFFICE O/P EST MOD 30 MIN: CPT | Performed by: FAMILY MEDICINE

## 2025-01-07 RX ORDER — SODIUM ZIRCONIUM CYCLOSILICATE 10 G/10G
10 POWDER, FOR SUSPENSION ORAL
COMMUNITY
Start: 2025-01-03

## 2025-01-07 RX ORDER — ONDANSETRON 4 MG/1
4 TABLET, ORALLY DISINTEGRATING ORAL PRN
COMMUNITY
Start: 2024-12-04

## 2025-01-07 NOTE — PROGRESS NOTES
Assessment & Plan     ICD-10-CM    1. Type 2 diabetes mellitus with diabetic nephropathy, without long-term current use of insulin (H)  E11.21 HEMOGLOBIN A1C     HEMOGLOBIN A1C     Lipid panel reflex to direct LDL Fasting     Continuous Glucose Sensor (FREESTYLE HARINI 2 SENSOR) [facility] [place]     Lipid panel reflex to direct LDL Fasting      2. Pigmentation abnormality of skin  L81.9 Adult Dermatology  Referral      3. Dermatosis  L98.9 Adult Dermatology  Referral      4. ESRD (end stage renal disease) on dialysis (H)  N18.6 Adult Dermatology  Referral    Z99.2 CBC with platelets and differential     CBC with platelets and differential      5. Primary hypertension  I10         Patient with diabetes, hypertension and ESRD on dialysis presents today for follow-up  1: Type 2 diabetes: Remains under fair control.  Since starting dialysis, she has not had need for any medication.  She is also quit drinking  2: ESRD: Continue dialysis per renal.  She does not qualify for Medicare assistance for renal failure and renal transplant as she does not have any additional insurance?.  Referral to care management  3: Primary hypertension: Blood pressure under suboptimal control.  However, she has fluctuating blood pressure from her renal dialysis and can get hypotensive.  She uses clonidine as per kidney specialist.  She is otherwise on amlodipine that she takes regularly along with her carvedilol.  4: Dermatosis, skin lesion and pigmentation of the skin.  Possibly related to dialysis.  Refer to dermatology.    Nicotine/Tobacco Cessation  She reports that she has been smoking cigarettes. She has never used smokeless tobacco.  Nicotine/Tobacco Cessation Plan  Self help information given to patient      BMI  Estimated body mass index is 34.18 kg/m  as calculated from the following:    Height as of 7/16/24: 1.524 m (5').    Weight as of this encounter: 79.4 kg (175 lb).         MEDICATIONS:   Orders Placed This  Encounter   Medications    LOKELMA 10 g PACK packet     Sig: Take 10 g by mouth. Taking once a month    ondansetron (ZOFRAN ODT) 4 MG ODT tab     Sig: Take 4 mg by mouth as needed for nausea.    Continuous Glucose Sensor (FREESTYLE HARINI 2 SENSOR) [facility]     Sig: Change every 14 days.     Dispense:  2 each     Refill:  5          - Continue other medications without change  See Patient Instructions    Declines Pap smear and immunizations    The longitudinal plan of care for the diagnosis(es)/condition(s) as documented were addressed during this visit. Due to the added complexity in care, I will continue to support Alysia in the subsequent management and with ongoing continuity of care.      Subjective   Alysia is a 39 year old, presenting for the following health issues:  Diabetes (Medication check ) and Derm Problem (Would like referral to dermatology /)        1/7/2025    12:59 PM   Additional Questions   Roomed by Dayanara RODRÍGUEZ CMA     History of Present Illness       Diabetes:   She presents for follow up of diabetes.    She is not checking blood glucose.        She is concerned about blood sugar frequently over 200 and other.   She is having numbness in feet and burning in feet.  The patient has had a diabetic eye exam in the last 12 months. Eye exam performed on 11/20/2024. Location of last eye exam [facility].        Hypertension: She presents for follow up of hypertension.  She does check blood pressure  regularly outside of the clinic. Outside blood pressures have been over 140/90. She does not follow a low salt diet.     She eats 0-1 servings of fruits and vegetables daily.She consumes 2 sweetened beverage(s) daily.She exercises with enough effort to increase her heart rate 9 or less minutes per day.  She exercises with enough effort to increase her heart rate 3 or less days per week.   She is taking medications regularly.     Patient Active Problem List   Diagnosis    Other specified visual disturbances    Tinnitus     Morbid obesity (H)    Type 2 diabetes mellitus with diabetic nephropathy, without long-term current use of insulin (H)    HTN (hypertension)    Hypertensive retinopathy of both eyes    Smoking    ESRD (end stage renal disease) on dialysis (H)    Secondary hyperparathyroidism of renal origin (H)    Tobacco dependence    Dependence on renal dialysis (H)    Hyperkalemia    Immunologic deficiency syndrome (H)    Intractable nausea and vomiting     Current Outpatient Medications   Medication Sig Dispense Refill    amLODIPine (NORVASC) 10 MG tablet Take 1 tablet (10 mg) by mouth daily 90 tablet 3    blood glucose (NO BRAND SPECIFIED) test strip Use to test blood sugar 1 times daily or as directed. To accompany: Blood Glucose Monitor Brands: per insurance. 100 strip 6    blood glucose monitoring (NO BRAND SPECIFIED) meter device kit Use to test blood sugar 1 times daily or as directed. Preferred blood glucose meter OR supplies to accompany: Blood Glucose Monitor Brands: per insurance. 1 kit 0    Blood Glucose Monitoring Suppl (CONTOUR BLOOD GLUCOSE SYSTEM) w/Device KIT 1 Device by Percutaneous route daily 1 kit 0    calcium acetate (PHOSLO) 667 MG CAPS capsule TAKE 1 CAPSULE BY MOUTH THREE TIMES DAILY WITH MEALS      carvedilol (COREG) 25 MG tablet Take 1 tablet (25 mg) by mouth 2 times daily (with meals) 180 tablet 1    cloNIDine (CATAPRES) 0.2 MG tablet Take 0.5 tablets (0.1 mg) by mouth 2 times daily      Continuous Glucose Sensor (FREESTYLE HARINI 2 SENSOR) MISC Change every 14 days. 2 each 5    Glucose Blood (MARIA DEL CARMEN CONTOUR TEST) strip Use to test blood sugars 2 times daily or as directed. 100 strip prn    LOKELMA 10 g PACK packet Take 10 g by mouth. Taking once a month      ondansetron (ZOFRAN ODT) 4 MG ODT tab Take 4 mg by mouth as needed for nausea.      thin (NO BRAND SPECIFIED) lancets Use with lanceting device. To accompany: Blood Glucose Monitor Brands: per insurance. 100 each 6     No current  facility-administered medications for this visit.         Review of Systems  Constitutional, HEENT, cardiovascular, pulmonary, gi and gu systems are negative, except as otherwise noted.      Objective    Wt 79.4 kg (175 lb)   LMP 12/27/2024 (Approximate)   BMI 34.18 kg/m    Body mass index is 34.18 kg/m .  Physical Exam   GENERAL: alert and no distress  Noted acne-like lesion and pigmentation with bronze the skin.  Noted black skin lesion like keratoacanthoma on the left upper lid    Results for orders placed or performed in visit on 01/07/25 (from the past 24 hours)   HEMOGLOBIN A1C   Result Value Ref Range    Estimated Average Glucose 131 (H) <117 mg/dL    Hemoglobin A1C 6.2 (H) 0.0 - 5.6 %   Extra Tube    Narrative    The following orders were created for panel order Extra Tube.  Procedure                               Abnormality         Status                     ---------                               -----------         ------                     Extra Green Top (Lithium...[[id number]]                      Final result                 Please view results for these tests on the individual orders.   Extra Green Top (Lithium Heparin) Tube   Result Value Ref Range    Hold Specimen [facility]    CBC with platelets and differential    Narrative    The following orders were created for panel order CBC with platelets and differential.  Procedure                               Abnormality         Status                     ---------                               -----------         ------                     CBC with platelets and d...[[id number]]  Abnormal            Final result                 Please view results for these tests on the individual orders.   CBC with platelets and differential   Result Value Ref Range    WBC Count 8.3 4.0 - 11.0 10e3/uL    RBC Count 4.49 3.80 - 5.20 10e6/uL    Hemoglobin 12.1 11.7 - 15.7 g/dL    Hematocrit 39.5 35.0 - 47.0 %    MCV 88 78 - 100 fL    MCH 26.9 26.5 - 33.0 pg    MCHC 30.6 (L)  31.5 - 36.5 g/dL    RDW 16.2 (H) 10.0 - 15.0 %    Platelet Count 258 150 - 450 10e3/uL    % Neutrophils 73 %    % Lymphocytes 13 %    % Monocytes 10 %    % Eosinophils 3 %    % Basophils 0 %    % Immature Granulocytes 0 %    Absolute Neutrophils 6.1 1.6 - 8.3 10e3/uL    Absolute Lymphocytes 1.1 0.8 - 5.3 10e3/uL    Absolute Monocytes 0.8 0.0 - 1.3 10e3/uL    Absolute Eosinophils 0.2 0.0 - 0.7 10e3/uL    Absolute Basophils 0.0 0.0 - 0.2 10e3/uL    Absolute Immature Granulocytes 0.0 <=0.4 10e3/uL           Signed Electronically by: Toby Howard MD

## 2025-01-08 LAB
CHOLEST SERPL-MCNC: 192 MG/DL
FASTING STATUS PATIENT QL REPORTED: NO
HDLC SERPL-MCNC: 23 MG/DL
LDLC SERPL CALC-MCNC: 119 MG/DL
NONHDLC SERPL-MCNC: 169 MG/DL
TRIGL SERPL-MCNC: 251 MG/DL

## 2025-01-25 ENCOUNTER — MYC MEDICAL ADVICE (OUTPATIENT)
Dept: FAMILY MEDICINE | Facility: CLINIC | Age: 40
End: 2025-01-25
Payer: COMMERCIAL

## 2025-01-28 NOTE — TELEPHONE ENCOUNTER
Disability parking certification application completed and placed in outbox.  Sending patient message to come and     Toby Howard MD

## 2025-02-14 ENCOUNTER — TRANSFERRED RECORDS (OUTPATIENT)
Dept: HEALTH INFORMATION MANAGEMENT | Facility: CLINIC | Age: 40
End: 2025-02-14
Payer: COMMERCIAL

## 2025-02-14 LAB — RETINOPATHY: POSITIVE

## 2025-03-03 ENCOUNTER — MYC MEDICAL ADVICE (OUTPATIENT)
Dept: INTERVENTIONAL RADIOLOGY/VASCULAR | Facility: CLINIC | Age: 40
End: 2025-03-03
Payer: COMMERCIAL

## 2025-03-10 NOTE — PROGRESS NOTES
Interventional Radiology - Pre-Procedure Evaluation:  Outpatient - [facility]  03/11/2025     Procedure Requested: fistulogram  Requested by: Dr. Herring    History and Physical Reviewed: H&P documented within 30 days (by Dr. Herring on 2/26/25). I have personally reviewed the patient's medical history and have updated the medical record as necessary.    HPI: Alysia Hernandez is a 39 year old female with PMHx HTN, DM and HD dependent ESRD.     Dialyzes via a left upper extremity brachiocephalic arteriovenous fistula on MWF at [facility] under the care of Dr. Herring. Fistula created by Dr. Joya 4/13/22. Most recent dialysis full run 3/10 with next scheduled 3/12. Most recent fistulogram in 2022 as noted below in imaging.    Presents for fistulogram 2/2 high venous pressures at dialysis, fistulogram requested to evaluate for outflow vein stenosis.       IMAGING:  Study Result    Narrative & Impression   LOCATION: [facility]  DATE: 8/11/2022     PROCEDURE: DIALYSIS ARTERIOVENOUS SHUNT EVALUATION AND ANASTOMOTIC / SEBASTIEN-ANASTOMOTIC ARTERIAL ANGIOPLASTY, COIL EMBOLIZATION OF COMPETING VEINS IN A DIALYSIS CIRCUITx2, ULTRASOUND GUIDANCE FOR VASCULAR ACCESS     INTERVENTIONAL RADIOLOGIST: Syd Pierce MD     INDICATION: 37-year-old female with nonmaturing left brachial artery to cephalic vein fistula. Baseline physical examination reveals a palpable thrill.     CONSENT: The risks, benefits and alternatives dialysis arteriovenous shunt evaluation with possible angioplasty, stent placement, or thrombolysis were discussed with the patient  in detail. All questions were answered. Informed consent was given to   proceed with the procedure.     MODERATE SEDATION: Versed 6 mg IV; Fentanyl 250 mcg IV. During the time out, immediately prior to the administration of medications, the patient was reassessed for adequacy to receive conscious sedation.  Under physician supervision,  Versed and fentanyl   were administered for moderate sedation. Pulse oximetry, heart rate and blood pressure were continuously monitored by an independent trained observer. The physician spent 90 minutes of face-to-face sedation time with the patient.     CONTRAST: 60 cc Visipaque  ANTIBIOTICS: None.  ADDITIONAL MEDICATIONS: None.     FLUOROSCOPIC TIME: 20.8 minutes.  RADIATION DOSE: Air Kerma: 132 mGy.     COMPLICATIONS: No immediate complications.     UNIVERSAL PROTOCOL: The operative site was marked and any prior imaging was reviewed. Required items including blood products, implants, devices and special equipment was made available. Patient identity was confirmed either verbally, with demographic   information, hospital assigned identification or other identification markers. A timeout was performed immediately prior to the procedure.     STERILE BARRIER TECHNIQUE: Maximum sterile barrier technique was used. Cutaneous antisepsis was performed at the operative site with application of 2% chlorhexidine and large sterile drape. Prior to the procedure, the  and assistant performed   hand hygiene and wore hat, mask, sterile gown, and sterile gloves during the entire procedure.     PROCEDURE:  Access to the fistula was achieved in an antegrade fashion using real-time ultrasound guidance and a micropuncture access kit. Imaging demonstrates a patent and compressible juxta anastomotic vein. Permanent images were stored to the patient's medical   record. Diagnostic fistulography was performed from the arterial inflow to the right atrium. Following the diagnostic study, an angled catheter and microcatheter were advanced coaxially to selectively catheterize two dominant competing veins. Both   competing veins were successfully embolized utilizing four Oksana detachable coils. Post coil embolization angiography demonstrates minimal filling of the competing veins.     Access to the fistula was achieved in a retrograde  "fashion using real-time ultrasound guidance and a micropuncture access kit. Imaging demonstrates a patent and compressible juxta anastomotic vein. Permanent images were stored to the patient's medical   record. An angled catheter and guidewire were used to select the catheterize the inflow brachial artery. Balloon angioplasty was performed using a 6 mm balloon at the arterial anastomosis. Completion fistulography was performed. The sheath was removed   and compression applied to the puncture site until hemostasis was achieved. There was a palpable thrill in the fistula at the completion of the procedure.     FINDINGS:  Patent left brachiocephalic fistula. There are multiple competing/collateral veins in the left upper arm. Widely patent terminal cephalic vein. No significant central venous stenosis is present.  No significant residual stenosis is present after   angioplasty.                                                                      IMPRESSION:    1. Left brachial artery to cephalic vein fistulogram demonstrates multiple competing veins overlying the upper arm. Widely patent outflow cephalic vein and central veins. There is moderate stenosis at the arteriovenous anastomosis.  2. Successful embolization of the two dominant competing veins and balloon angioplasty of the arteriovenous anastomosis. Post intervention imaging demonstrates excellent result with marked decrease in filling of competing veins and no significant   residual stenosis at the arteriovenous anastomosis.     PLAN: Plan for repeat left upper extremity hemodialysis ultrasound on 8/23/2022. If mean flow volumes are greater than 600 cc/min, suggest cannulation of the fistula.       NPO: midnight  ANTICOAGULANTS/ANTIPLATELETS: ASA 81mg - no hold required for procedure  ANTIBIOTICS: none indicated for routine procedure    ALLERGIES:  No Known Allergies      LABS:  No results found for: \"INR\"   Hemoglobin   Date Value Ref Range Status "   01/07/2025 12.1 11.7 - 15.7 g/dL Final     Platelet Count   Date Value Ref Range Status   01/07/2025 258 150 - 450 10e3/uL Final     Creatinine   Date Value Ref Range Status   07/16/2024 8.42 (H) 0.51 - 0.95 mg/dL Final   11/08/2013 0.5 (L) 0.6 - 1.3 mg/dL Final     Potassium   Date Value Ref Range Status   07/16/2024 4.8 3.4 - 5.3 mmol/L Final   04/30/2022 4.0 3.5 - 5.0 mmol/L Final   11/08/2013 4.0 3.4 - 5.3 mmol/L Final         EXAM:  BP (!) 163/76 (BP Location: Right arm)   Pulse 68   Temp 98.3  F (36.8  C) (Oral)   Resp 16   Ht 1.524 m (5')   Wt 79.4 kg (175 lb)   SpO2 100%   BMI 34.18 kg/m    General: Stable. In no acute distress.    Neurologic: Alert and oriented x 3. No focal deficits.  Psychiatric: Appropriate mood and affect. Cooperative. Answering questions appropriately. Linear/coherent thought process.   Respiratory: Normal respirations on room air. Lungs clear to auscultation bilaterally.  Cardiac: S1S2, regular rate and rhythm, with murmur, clicks or rubs.  Vascular: Left upper extremity AVF with palpable thrill and bruit. Site without aneurysm, erythema, excoriation, ecchymosis or warmth. Site marked.      PRE-SEDATION ASSESSMENT:  Mallampati Airway Classification:  III - Only soft palate is visible. Intubation is predicted to be difficult  Previous reaction to anesthesia/sedation:  No  Sedation plan based on assessment: Moderate (conscious) sedation  ASA Classification: Class 3 - SEVERE SYSTEMIC DISEASE, DEFINITE FUNCTIONAL LIMITATIONS.   Code Status: FULL CODE      ASSESSMENT/PLAN:   Left upper extremity fistulogram with potential intervention and with sedation.    Procedural education reviewed with patient in detail including, but not limited to risks, benefits and alternatives with understanding verbalized by patient.    Total time spent on the date of the encounter: 40 minutes.      MARGA Venegas CNP  Interventional Radiology

## 2025-03-11 ENCOUNTER — HOSPITAL ENCOUNTER (OUTPATIENT)
Dept: INTERVENTIONAL RADIOLOGY/VASCULAR | Facility: HOSPITAL | Age: 40
Discharge: HOME | End: 2025-03-11
Attending: INTERNAL MEDICINE | Admitting: RADIOLOGY
Payer: COMMERCIAL

## 2025-03-11 VITALS
HEART RATE: 68 BPM | TEMPERATURE: 97.9 F | SYSTOLIC BLOOD PRESSURE: 145 MMHG | DIASTOLIC BLOOD PRESSURE: 66 MMHG | HEIGHT: 60 IN | OXYGEN SATURATION: 99 % | BODY MASS INDEX: 34.36 KG/M2 | RESPIRATION RATE: 16 BRPM | WEIGHT: 175 LBS

## 2025-03-11 DIAGNOSIS — N18.6 ESRD (END STAGE RENAL DISEASE) (H): ICD-10-CM

## 2025-03-11 PROCEDURE — 36901 INTRO CATH DIALYSIS CIRCUIT: CPT

## 2025-03-11 PROCEDURE — C1769 GUIDE WIRE: HCPCS

## 2025-03-11 PROCEDURE — 255N000002 HC RX 255 OP 636: Performed by: RADIOLOGY

## 2025-03-11 PROCEDURE — 272N000500 HC NEEDLE CR2

## 2025-03-11 PROCEDURE — 76937 US GUIDE VASCULAR ACCESS: CPT

## 2025-03-11 PROCEDURE — 272N000566 HC SHEATH CR3

## 2025-03-11 PROCEDURE — 250N000011 HC RX IP 250 OP 636: Performed by: NURSE PRACTITIONER

## 2025-03-11 PROCEDURE — 99152 MOD SED SAME PHYS/QHP 5/>YRS: CPT

## 2025-03-11 PROCEDURE — 272N000302 HC DEVICE INFLATION CR5

## 2025-03-11 PROCEDURE — 250N000009 HC RX 250: Performed by: NURSE PRACTITIONER

## 2025-03-11 RX ORDER — NALOXONE HYDROCHLORIDE 0.4 MG/ML
0.4 INJECTION, SOLUTION INTRAMUSCULAR; INTRAVENOUS; SUBCUTANEOUS
Status: DISCONTINUED | OUTPATIENT
Start: 2025-03-11 | End: 2025-03-12 | Stop reason: HOSPADM

## 2025-03-11 RX ORDER — HEPARIN SODIUM 200 [USP'U]/100ML
1 INJECTION, SOLUTION INTRAVENOUS CONTINUOUS PRN
Status: DISCONTINUED | OUTPATIENT
Start: 2025-03-11 | End: 2025-03-12 | Stop reason: HOSPADM

## 2025-03-11 RX ORDER — NALOXONE HYDROCHLORIDE 0.4 MG/ML
0.2 INJECTION, SOLUTION INTRAMUSCULAR; INTRAVENOUS; SUBCUTANEOUS
Status: DISCONTINUED | OUTPATIENT
Start: 2025-03-11 | End: 2025-03-12 | Stop reason: HOSPADM

## 2025-03-11 RX ORDER — LIDOCAINE 40 MG/G
CREAM TOPICAL
Status: DISCONTINUED | OUTPATIENT
Start: 2025-03-11 | End: 2025-03-12 | Stop reason: HOSPADM

## 2025-03-11 RX ORDER — ONDANSETRON 2 MG/ML
4 INJECTION INTRAMUSCULAR; INTRAVENOUS
Status: DISCONTINUED | OUTPATIENT
Start: 2025-03-11 | End: 2025-03-12 | Stop reason: HOSPADM

## 2025-03-11 RX ORDER — IODIXANOL 320 MG/ML
100 INJECTION, SOLUTION INTRAVASCULAR ONCE
Status: COMPLETED | OUTPATIENT
Start: 2025-03-11 | End: 2025-03-11

## 2025-03-11 RX ORDER — FLUMAZENIL 0.1 MG/ML
0.2 INJECTION, SOLUTION INTRAVENOUS
Status: DISCONTINUED | OUTPATIENT
Start: 2025-03-11 | End: 2025-03-12 | Stop reason: HOSPADM

## 2025-03-11 RX ORDER — ONDANSETRON 2 MG/ML
4 INJECTION INTRAMUSCULAR; INTRAVENOUS
Status: COMPLETED | OUTPATIENT
Start: 2025-03-11 | End: 2025-03-11

## 2025-03-11 RX ORDER — FENTANYL CITRATE 50 UG/ML
25-50 INJECTION, SOLUTION INTRAMUSCULAR; INTRAVENOUS EVERY 5 MIN PRN
Status: DISCONTINUED | OUTPATIENT
Start: 2025-03-11 | End: 2025-03-12 | Stop reason: HOSPADM

## 2025-03-11 RX ADMIN — MIDAZOLAM HYDROCHLORIDE 1 MG: 1 INJECTION, SOLUTION INTRAMUSCULAR; INTRAVENOUS at 08:19

## 2025-03-11 RX ADMIN — IODIXANOL 25 ML: 320 INJECTION, SOLUTION INTRAVASCULAR at 08:27

## 2025-03-11 RX ADMIN — MIDAZOLAM HYDROCHLORIDE 1 MG: 1 INJECTION, SOLUTION INTRAMUSCULAR; INTRAVENOUS at 08:08

## 2025-03-11 RX ADMIN — FENTANYL CITRATE 50 MCG: 50 INJECTION, SOLUTION INTRAMUSCULAR; INTRAVENOUS at 08:14

## 2025-03-11 RX ADMIN — FENTANYL CITRATE 50 MCG: 50 INJECTION, SOLUTION INTRAMUSCULAR; INTRAVENOUS at 08:09

## 2025-03-11 RX ADMIN — HEPARIN SODIUM IN SODIUM CHLORIDE 1 BAG: 200 INJECTION INTRAVENOUS at 08:07

## 2025-03-11 RX ADMIN — LIDOCAINE HYDROCHLORIDE 20 ML: 10 INJECTION, SOLUTION INFILTRATION; PERINEURAL at 08:04

## 2025-03-11 RX ADMIN — MIDAZOLAM HYDROCHLORIDE 2 MG: 1 INJECTION, SOLUTION INTRAMUSCULAR; INTRAVENOUS at 08:01

## 2025-03-11 RX ADMIN — FENTANYL CITRATE 50 MCG: 50 INJECTION, SOLUTION INTRAMUSCULAR; INTRAVENOUS at 08:06

## 2025-03-11 RX ADMIN — ONDANSETRON 4 MG: 2 INJECTION, SOLUTION INTRAMUSCULAR; INTRAVENOUS at 09:06

## 2025-03-11 NOTE — PROGRESS NOTES
Interventional Radiology Brief Post Procedure Note    Procedure: IR DIALYSIS FISTULOGRAM LEFT      Time Out: Prior to the start of the procedure and with procedural staff participation, I verbally confirmed the patient s identity using two indicators, relevant allergies, that the procedure was appropriate and matched the consent or emergent situation, and that the correct equipment/implants were available. Immediately prior to starting the procedure I conducted the Time Out with the procedural staff and re-confirmed the patient s name, procedure, and site/side. (The [employer] universal protocol was followed.)  Yes    Medications   Medication Event Details Admin User Admin Time       Sedation: IR Nurse Monitored Care   Post Procedure Summary:  Prior to the start of the procedure and with procedural staff participation, I verbally confirmed the patient s identity using two indicators, relevant allergies, that the procedure was appropriate and matched the consent or emergent situation, and that the correct equipment/implants were available. Immediately prior to starting the procedure I conducted the Time Out with the procedural staff and re-confirmed the patient s name, procedure, and site/side. (The [employer] universal protocol was followed.)  Yes       Sedatives: Fentanyl and Midazolam (Versed)    Vital signs, airway and pulse oximetry were monitored and remained stable throughout the procedure and sedation was maintained until the procedure was complete.  The patient was monitored by staff until sedation discharge criteria were met.    Patient tolerance: Patient tolerated the procedure well with no immediate complications.    Time of sedation in minutes: 30 Minutes minutes from beginning to end of physician one to one monitoring.    Findings: cephalic arch stenosis    Estimated Blood Loss: Minimal    Fluoroscopy Time:  minute(s)    SPECIMENS: None    Complications: 1. None     Condition:  Stable    Plan: routine post use and care    Comments: See dictated procedure note for full details.    Howard Leyva MD

## 2025-03-11 NOTE — PRE-PROCEDURE
GENERAL PRE-PROCEDURE:   Procedure:  Fistulogram  Date/Time:  3/11/2025 7:54 AM    Written consent obtained?: Yes    Risks and benefits: Risks, benefits and alternatives were discussed    Consent given by:  Patient  Patient states understanding of procedure being performed: Yes    Patient's understanding of procedure matches consent: Yes    Procedure consent matches procedure scheduled: Yes    Expected level of sedation:  Moderate  Appropriately NPO:  Yes  ASA Class:  3  Mallampati  :  Grade 3- soft palate visible, posterior pharyngeal wall not visible  Lungs:  Lungs clear with good breath sounds bilaterally  Heart:  Other (comment) and normal heart sounds and rate  Heart exam comment:  W/ murmur  History & Physical reviewed:  History and physical reviewed and no updates needed  Statement of review:  I have reviewed the lab findings, diagnostic data, medications, and the plan for sedation

## 2025-03-11 NOTE — PROVIDER NOTIFICATION
Patient Name: Alysia Hernandez  Medical Record Number: [id number]  Today's Date: 3/11/2025    Procedure: fistulagram  Proceduralist: Dr Hearn    Sedation medications administered: 4 mg midazolam and 150 mcg fentanyl   Sedation time: 20 minutes    Pt given discharge instructions without questions or concerns.  Pt ambulated from IR with his nephew post procedure without difficulty.

## 2025-04-14 ENCOUNTER — TRANSFERRED RECORDS (OUTPATIENT)
Dept: HEALTH INFORMATION MANAGEMENT | Facility: CLINIC | Age: 40
End: 2025-04-14
Payer: COMMERCIAL

## 2025-04-24 ENCOUNTER — MYC REFILL (OUTPATIENT)
Dept: FAMILY MEDICINE | Facility: CLINIC | Age: 40
End: 2025-04-24
Payer: COMMERCIAL

## 2025-04-24 DIAGNOSIS — R11.0 NAUSEA: Primary | ICD-10-CM

## 2025-04-28 PROCEDURE — 84520 ASSAY OF UREA NITROGEN: CPT | Performed by: STUDENT IN AN ORGANIZED HEALTH CARE EDUCATION/TRAINING PROGRAM

## 2025-04-28 RX ORDER — ONDANSETRON 4 MG/1
4 TABLET, ORALLY DISINTEGRATING ORAL PRN
Qty: 10 TABLET | Refills: 0 | Status: SHIPPED | OUTPATIENT
Start: 2025-04-28

## 2025-04-29 ENCOUNTER — LAB REQUISITION (OUTPATIENT)
Dept: LAB | Facility: HOSPITAL | Age: 40
End: 2025-04-29

## 2025-04-29 LAB
BUN SERPL-MCNC: 21 MG/DL (ref 6–20)
BUN SERPL-MCNC: 67.1 MG/DL (ref 6–20)

## 2025-05-07 PROBLEM — G47.00 INSOMNIA, UNSPECIFIED: Status: ACTIVE | Noted: 2025-01-22

## 2025-06-16 ENCOUNTER — HOSPITAL ENCOUNTER (EMERGENCY)
Facility: HOSPITAL | Age: 40
Discharge: HOME | End: 2025-06-16
Attending: EMERGENCY MEDICINE | Admitting: EMERGENCY MEDICINE
Payer: COMMERCIAL

## 2025-06-16 VITALS
HEIGHT: 61 IN | SYSTOLIC BLOOD PRESSURE: 164 MMHG | WEIGHT: 173 LBS | TEMPERATURE: 98.3 F | BODY MASS INDEX: 32.66 KG/M2 | DIASTOLIC BLOOD PRESSURE: 73 MMHG | HEART RATE: 74 BPM | OXYGEN SATURATION: 98 % | RESPIRATION RATE: 16 BRPM

## 2025-06-16 DIAGNOSIS — N17.9 ACUTE RENAL FAILURE SUPERIMPOSED ON CHRONIC KIDNEY DISEASE, ON CHRONIC DIALYSIS, UNSPECIFIED ACUTE RENAL FAILURE TYPE (H): ICD-10-CM

## 2025-06-16 DIAGNOSIS — R79.89 ELEVATED TROPONIN: ICD-10-CM

## 2025-06-16 DIAGNOSIS — R07.89 ATYPICAL CHEST PAIN: ICD-10-CM

## 2025-06-16 DIAGNOSIS — N18.6 ACUTE RENAL FAILURE SUPERIMPOSED ON CHRONIC KIDNEY DISEASE, ON CHRONIC DIALYSIS, UNSPECIFIED ACUTE RENAL FAILURE TYPE (H): ICD-10-CM

## 2025-06-16 DIAGNOSIS — Z99.2 ACUTE RENAL FAILURE SUPERIMPOSED ON CHRONIC KIDNEY DISEASE, ON CHRONIC DIALYSIS, UNSPECIFIED ACUTE RENAL FAILURE TYPE (H): ICD-10-CM

## 2025-06-16 LAB
ANION GAP SERPL CALCULATED.3IONS-SCNC: 20 MMOL/L (ref 7–15)
BUN SERPL-MCNC: 41.6 MG/DL (ref 6–20)
CALCIUM SERPL-MCNC: 9.5 MG/DL (ref 8.8–10.4)
CHLORIDE SERPL-SCNC: 92 MMOL/L (ref 98–107)
CREAT SERPL-MCNC: 13.34 MG/DL (ref 0.51–0.95)
EGFRCR SERPLBLD CKD-EPI 2021: 3 ML/MIN/1.73M2
ERYTHROCYTE [DISTWIDTH] IN BLOOD BY AUTOMATED COUNT: 14.6 % (ref 10–15)
GLUCOSE SERPL-MCNC: 159 MG/DL (ref 70–99)
HCO3 SERPL-SCNC: 24 MMOL/L (ref 22–29)
HCT VFR BLD AUTO: 32.8 % (ref 35–47)
HGB BLD-MCNC: 10.7 G/DL (ref 11.7–15.7)
MAGNESIUM SERPL-MCNC: 2.2 MG/DL (ref 1.7–2.3)
MCH RBC QN AUTO: 27.7 PG (ref 26.5–33)
MCHC RBC AUTO-ENTMCNC: 32.6 G/DL (ref 31.5–36.5)
MCV RBC AUTO: 85 FL (ref 78–100)
PLATELET # BLD AUTO: 245 10E3/UL (ref 150–450)
POTASSIUM SERPL-SCNC: 4.3 MMOL/L (ref 3.4–5.3)
RBC # BLD AUTO: 3.86 10E6/UL (ref 3.8–5.2)
SODIUM SERPL-SCNC: 136 MMOL/L (ref 135–145)
TROPONIN T SERPL HS-MCNC: 31 NG/L
TROPONIN T SERPL HS-MCNC: 32 NG/L
WBC # BLD AUTO: 10.9 10E3/UL (ref 4–11)

## 2025-06-16 PROCEDURE — 93005 ELECTROCARDIOGRAM TRACING: CPT | Performed by: EMERGENCY MEDICINE

## 2025-06-16 PROCEDURE — 85018 HEMOGLOBIN: CPT | Performed by: EMERGENCY MEDICINE

## 2025-06-16 PROCEDURE — 80048 BASIC METABOLIC PNL TOTAL CA: CPT | Performed by: EMERGENCY MEDICINE

## 2025-06-16 PROCEDURE — 84484 ASSAY OF TROPONIN QUANT: CPT | Performed by: EMERGENCY MEDICINE

## 2025-06-16 PROCEDURE — 99284 EMERGENCY DEPT VISIT MOD MDM: CPT

## 2025-06-16 PROCEDURE — 36415 COLL VENOUS BLD VENIPUNCTURE: CPT | Performed by: EMERGENCY MEDICINE

## 2025-06-16 PROCEDURE — 83735 ASSAY OF MAGNESIUM: CPT | Performed by: EMERGENCY MEDICINE

## 2025-06-16 ASSESSMENT — ACTIVITIES OF DAILY LIVING (ADL)
ADLS_ACUITY_SCORE: 52

## 2025-06-16 NOTE — ED TRIAGE NOTES
Pt has had chest and right shoulder pain that hurts more when she uses her right arm and pushing. Has been hurting for 3 days pt missed dialysis today.      Triage Assessment (Adult)       Row Name 06/16/25 [id number]          Triage Assessment    Airway WDL WDL        Respiratory WDL    Respiratory WDL WDL        Cardiac WDL    Cardiac WDL X;chest pain        Chest Pain Assessment    Chest Pain Location midsternal;anterior chest, right     Character aching        Peripheral/Neurovascular WDL    Peripheral Neurovascular WDL WDL        Cognitive/Neuro/Behavioral WDL    Cognitive/Neuro/Behavioral WDL WDL

## 2025-06-17 NOTE — ED PROVIDER NOTES
EMERGENCY DEPARTMENT ENCOUNTER      NAME: Alysia Hernandez  AGE: 40 year old female  YOB: 1985  MRN: [id number]  EVALUATION DATE & TIME: No admission date for patient encounter.    PCP: Toby Howard    ED PROVIDER: Abraham Hong M.D.      Chief Complaint   Patient presents with    Chest Pain         FINAL IMPRESSION:  Musculoskeletal chest pain  Chronic renal failure    ED COURSE & MEDICAL DECISION MAKING:    Pertinent Labs & Imaging studies reviewed. (See chart for details)  40 year old female presents to the Emergency Department for evaluation of chest discomfort and right shoulder discomfort.  Symptoms ongoing for the last few days.  Worsens with movement.  Also reports missing dialysis today.  Typically dialyzes Monday Wednesday Fridays.  Continues to produce urine twice daily.  Patient seen in triage area due to ED overcrowding exam reveals well-nourished follow-up female in minimal distress.  Patient with slight tenderness along the superior right chest with increased discomfort with movement of the right shoulder suggesting simple musculoskeletal discomfort.  However given patient's missing her dialysis will obtain baseline blood work to assess for need for urgent dialysis. Patient appears non toxic with stable vitals signs. Overall exam is benign.      7:11 PM I met with the patient for the initial interview and physical examination. Discussed plan for treatment and workup in the ED.    8:48 PM.  CBC with minimal anemia hemoglobin of 10.7.  Magnesium normal 2.2.  Troponin slightly elevated at 32.  Delta troponin will be obtained.  Electrolytes essentially normal elevated elevated BUN and creatinine consistent with renal failure.  Troponin likely elevated due to her chronic renal failure.  If unchanged will continue outpatient management.  Patient will need to call nephrology in the morning to schedule dialysis per  9:47 PM Reassessed and updated patient with results.  10:09 PM.  Repeat  troponin is unchanged.  Will continue outpatient management.  Patient understands the need to call her nephrologist in the morning to schedule dialysis  At the conclusion of the encounter I discussed the results of all of the tests and the disposition. The questions were answered and return precautions provided. The patient or family acknowledged understanding and was agreeable with the care plan.       .     MEDICATIONS GIVEN IN THE EMERGENCY:  Medications - No data to display    NEW PRESCRIPTIONS STARTED AT TODAY'S ER VISIT  New Prescriptions    No medications on file          =================================================================    HPI    Patient information was obtained from: patient    Use of Intrepreter: N/A         Alysia Hernandez is a 40 year old female with a pertient medical history of ESRD on hemodialysis (M/W/F), HTN, DM2, morbid obesity, tobacco dependence, who presents to the ED for evaluation of chest pain.    Patient endorses history of ESRD on hemodialysis Monday, Wednesday, and Friday (last full run was 6/13/2025). Still able to produce urine (about 1-2x daily). For the past 3 days, she's had persistent right sided chest pain. States that she woke up with it. Pain is exacerbated with movement of the right arm. She describes the pain as a pressure/achy-ness. She denies any associated trauma, falls, or injury. She has been taking tylenol without relief. Today, she missed dialysis because she was at work this morning and it was getting late today. Stated that she rather go to the ED to get her heart checked. There were no other concerns/complaints at this time.    Per chart review:  -6/9/2025: Patient was seen at [facility] for hemodialysis. Patient was previously taking carvedilol 50 mg bid instead of 37.5 mg bid. Correct prescription was sent. She's taking amlodipine 10 mg 1x/daily, carvedilol 50 mg bid, torsemide 100 mg 1x daily, and clonidine 0.1 mg bid.        REVIEW OF SYSTEMS   Constitutional:  Denies fever, chills  Respiratory:  Denies productive cough or increased work of breathing  Cardiovascular: Endorses right chest pain. Denies palpitations  GI:  Denies abdominal pain, nausea, vomiting, or change in bowel or bladder habits   Musculoskeletal:  Denies any new muscle/joint swelling  Skin:  Denies rash   Neurologic:  Denies focal weakness  All systems negative except as marked.     PAST MEDICAL HISTORY:  Past Medical History:   Diagnosis Date    Acute respiratory failure with hypoxia (H) 04/10/2022    Chronic kidney disease, stage V (H)     Diabetes (H) 02/09/2020    Diabetes mellitus (H)     Fluid overload 04/10/2022    Ganglion 09/14/2015    HTN (hypertension) 09/14/2015    Hypertensive retinopathy of both eyes 04/19/2013    Microalbuminuria 02/09/2020    Nephrotic range proteinuria 06/11/2020    Obesity (BMI 30.0-34.9) 02/09/2020    Renal failure, unspecified chronicity 02/09/2020    Smoking 05/04/2016       PAST SURGICAL HISTORY:  Past Surgical History:   Procedure Laterality Date    CREATE FISTULA ARTERIOVENOUS UPPER EXTREMITY Left 4/13/2022    Procedure: CREATION, ARTERIOVENOUS FISTULA,LEFT  UPPER EXTREMITY;THROMBELECTOMY LEFT CEPHALIC VEIN.;  Surgeon: Sobeida Joya MD;  [place] [facility] [place]    IR CVC NON TUNNEL LINE REMOVAL  4/28/2022    IR CVC NON TUNNEL PLACEMENT > 5 YRS  4/26/2022    IR CVC TUNNEL PLACEMENT > 5 YRS OF AGE  4/29/2022    IR DIALYSIS FISTULOGRAM LEFT  8/11/2022    IR DIALYSIS FISTULOGRAM LEFT  3/11/2025         CURRENT MEDICATIONS:    No current facility-administered medications for this encounter.    Current Outpatient Medications:     amLODIPine (NORVASC) 10 MG tablet, Take 1 tablet (10 mg) by mouth daily, Disp: 90 tablet, Rfl: 3    amLODIPine (NORVASC) 5 MG tablet, , Disp: , Rfl:     aspirin (ASA) 81 MG EC tablet, Take 81 mg by mouth daily., Disp: , Rfl:     benzonatate (TESSALON) 100 MG capsule, Take 100 mg by  mouth 3 times daily., Disp: , Rfl:     blood glucose (NO BRAND SPECIFIED) test strip, Use to test blood sugar 1 times daily or as directed. To accompany: Blood Glucose Monitor Brands: per insurance., Disp: 100 strip, Rfl: 6    blood glucose monitoring (NO BRAND SPECIFIED) meter device kit, Use to test blood sugar 1 times daily or as directed. Preferred blood glucose meter OR supplies to accompany: Blood Glucose Monitor Brands: per insurance., Disp: 1 kit, Rfl: 0    Blood Glucose Monitoring Suppl (CONTOUR BLOOD GLUCOSE SYSTEM) w/Device KIT, 1 Device by Percutaneous route daily, Disp: 1 kit, Rfl: 0    calcium acetate (PHOSLO) 667 MG CAPS capsule, TAKE 1 CAPSULE BY MOUTH THREE TIMES DAILY WITH MEALS, Disp: , Rfl:     carvedilol (COREG) 25 MG tablet, Take 1 tablet (25 mg) by mouth 2 times daily (with meals), Disp: 180 tablet, Rfl: 1    cloNIDine (CATAPRES) 0.2 MG tablet, Take 0.5 tablets (0.1 mg) by mouth 2 times daily, Disp: , Rfl:     Continuous Glucose Sensor (FREESTYLE HARINI 2 SENSOR) [facility], Change every 14 days., Disp: 2 each, Rfl: 5    Glucose Blood (MARIA DEL CARMEN CONTOUR TEST) strip, Use to test blood sugars 2 times daily or as directed., Disp: 100 strip, Rfl: prn    LOKELMA 10 g PACK packet, Take 10 g by mouth. Taking once a month, Disp: , Rfl:     ondansetron (ZOFRAN ODT) 4 MG ODT tab, Take 1 tablet (4 mg) by mouth as needed for nausea., Disp: 10 tablet, Rfl: 0    thin (NO BRAND SPECIFIED) lancets, Use with lanceting device. To accompany: Blood Glucose Monitor Brands: per insurance., Disp: 100 each, Rfl: 6    ALLERGIES:  Allergies   Allergen Reactions    Lisinopril Cough    Losartan Other (See Comments) and Cough     Caused cough that caused urinary incontinence       FAMILY HISTORY:  Family History   Problem Relation Age of Onset    Diabetes Mother         50    Cerebrovascular Disease Mother     Hyperlipidemia Father     No Known Problems Sister     No Known Problems Brother     Cerebrovascular Disease Paternal Aunt         SOCIAL HISTORY:   Social History     Socioeconomic History    Marital status: Single     Spouse name: None    Number of children: None    Years of education: None    Highest education level: None   Tobacco Use    Smoking status: Every Day     Current packs/day: 0.25     Types: Cigarettes    Smokeless tobacco: Never    Tobacco comments:     currently on E -cig. -- 11/8/13   Vaping Use    Vaping status: Never Used   Substance and Sexual Activity    Alcohol use: No    Drug use: No   Social History Narrative    Lives alone at dad's home. 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  Partnered for 3 years. No children  Has has step kids ( 2) 18 and 8.    Toby Howard MD  2/7/2020         Social Drivers of Health     Financial Resource Strain: Low Risk  (9/26/2023)    Financial Resource Strain     Within the past 12 months, have you or your family members you live with been unable to get utilities (heat, electricity) when it was really needed?: No   Food Insecurity: No Food Insecurity (7/9/2024)    Received from [employer]     Hunger Vital Sign     Worried About Running Out of Food in the Last Year: Never true     Ran Out of Food in the Last Year: Never true   Transportation Needs: No Transportation Needs (7/9/2024)    [facility] from [employer]     [facility]     Lack of Transportation (Medical): No     Lack of Transportation (Non-Medical): No   Interpersonal Safety: Low Risk  (3/11/2025)    Interpersonal Safety     Do you feel physically and emotionally safe where you currently live?: Yes     Within the past 12 months, have you been hit, slapped, kicked or otherwise physically hurt by someone?: No     Within the past 12 months, have you been humiliated or emotionally abused in other ways by your partner or ex-partner?: No   Housing Stability: Low Risk  (7/9/2024)    Received from [employer]     Housing Stability Vital Sign     Unable to Pay for Housing in the  "Last Year: No     Number of Times Moved in the Last Year: 0     Homeless in the Last Year: No       VITALS:  Patient Vitals for the past 24 hrs:   BP Temp Temp src Pulse Resp SpO2 Height Weight   06/16/25 1814 (!) 188/87 98.3  F (36.8  C) Temporal 89 16 99 % 1.549 m (5' 1\") 78.5 kg (173 lb)        PHYSICAL EXAM    Constitutional:  Awake, alert, mild distress  HENT:  Normocephalic, Atraumatic. Bilateral external ears normal. Oropharynx moist. Nose normal. Neck- Normal range of motion with no guarding, No midline cervical tenderness, Supple, No stridor.   Eyes:  PERRL, EOMI with no signs of entrapment, Conjunctiva normal, No discharge.   Respiratory:  Normal breath sounds, No respiratory distress, No wheezing.    Cardiovascular:  Normal heart rate, Normal rhythm, No appreciable rubs or gallops. Slight tenderness to upper right chest with discomfort with ROM of right shoulder.  GI:  Soft, No tenderness, No distension, No palpable masses  Musculoskeletal:  Intact distal pulses, No edema. Good range of motion in all major joints. No tenderness to palpation or major deformities noted.  Integument:  Warm, Dry, No erythema, No rash.   Neurologic:  Alert & oriented, Normal motor function, Normal sensory function, No focal deficits noted.   Psychiatric:  Affect normal, Judgment normal, Mood normal.     LAB:  All pertinent labs reviewed and interpreted.     Results for orders placed or performed during the hospital encounter of 06/16/25   Basic metabolic panel     Status: Abnormal   Result Value Ref Range    Sodium 136 135 - 145 mmol/L    Potassium 4.3 3.4 - 5.3 mmol/L    Chloride 92 (L) 98 - 107 mmol/L    Carbon Dioxide (CO2) 24 22 - 29 mmol/L    Anion Gap 20 (H) 7 - 15 mmol/L    Urea Nitrogen 41.6 (H) 6.0 - 20.0 mg/dL    Creatinine 13.34 (H) 0.51 - 0.95 mg/dL    GFR Estimate 3 (L) >60 mL/min/1.73m2    Calcium 9.5 8.8 - 10.4 mg/dL    Glucose 159 (H) 70 - 99 mg/dL   Magnesium     Status: Normal   Result Value Ref Range    " Magnesium 2.2 1.7 - 2.3 mg/dL   Troponin T, High Sensitivity     Status: Abnormal   Result Value Ref Range    Troponin T, High Sensitivity 32 (H) <=14 ng/L   CBC (+ platelets, no diff)     Status: Abnormal   Result Value Ref Range    WBC Count 10.9 4.0 - 11.0 10e3/uL    RBC Count 3.86 3.80 - 5.20 10e6/uL    Hemoglobin 10.7 (L) 11.7 - 15.7 g/dL    Hematocrit 32.8 (L) 35.0 - 47.0 %    MCV 85 78 - 100 fL    MCH 27.7 26.5 - 33.0 pg    MCHC 32.6 31.5 - 36.5 g/dL    RDW 14.6 10.0 - 15.0 %    Platelet Count 245 150 - 450 10e3/uL   Troponin T, High Sensitivity     Status: Abnormal   Result Value Ref Range    Troponin T, High Sensitivity 31 (H) <=14 ng/L        RADIOLOGY:  Reviewed all pertinent imaging. Please see official radiology report.  No orders to display       EKG:    Normal sinus rhythm.  Rate of 82.  Poor R wave progression in anterior leads.  No acute ST segment abnormalities.  Unchanged compared to April 23, 2022  I have independently reviewed and interpreted the EKG(s) documented above.    PROCEDURES:   None       I, Rosmery Cade, am serving as a scribe to document services personally performed by Abraham Hong MD, based on my observation and the provider's statements to me. I, Abraham Hong MD attest that Rosmery Cade is acting in a scribe capacity, has observed my performance of the services and has documented them in accordance with my direction.    Abraham Hong M.D.  Emergency Medicine  [facility] EMERGENCY DEPARTMENT     Abraham Hong MD  06/16/25 [id number]

## 2025-06-18 LAB
ATRIAL RATE - MUSE: 82 BPM
DIASTOLIC BLOOD PRESSURE - MUSE: NORMAL MMHG
INTERPRETATION ECG - MUSE: NORMAL
P AXIS - MUSE: 70 DEGREES
PR INTERVAL - MUSE: 182 MS
QRS DURATION - MUSE: 94 MS
QT - MUSE: 402 MS
QTC - MUSE: 469 MS
R AXIS - MUSE: 63 DEGREES
SYSTOLIC BLOOD PRESSURE - MUSE: NORMAL MMHG
T AXIS - MUSE: 59 DEGREES
VENTRICULAR RATE- MUSE: 82 BPM

## 2025-07-13 ENCOUNTER — HEALTH MAINTENANCE LETTER (OUTPATIENT)
Age: 40
End: 2025-07-13

## 2025-07-23 ENCOUNTER — PATIENT OUTREACH (OUTPATIENT)
Dept: FAMILY MEDICINE | Facility: CLINIC | Age: 40
End: 2025-07-23
Payer: COMMERCIAL

## 2025-07-23 NOTE — TELEPHONE ENCOUNTER
Patient Quality Outreach    Patient is due for the following:   Hypertension -  BP check  Cervical Cancer Screening - PAP Needed      Topic Date Due    Hepatitis B Vaccine (4 of 4 - Risk Dialysis 4-dose series) 03/24/2000    Zoster (Shingles) Vaccine (1 of 2) Never done    COVID-19 Vaccine (3 - Moderna risk series) 05/23/2021    Pneumococcal Vaccine (3 of 3 - PCV) 04/04/2023    Diptheria Tetanus Pertussis (DTAP/TDAP/TD) Vaccine (8 - Td or Tdap) 11/08/2023       Action(s) Taken:   Patient has upcoming appointment, these items will be addressed at that time.    Type of outreach:    Sent Redicam message.    Questions for provider review:    None         Maria Fernanda Sarmiento MA  Chart routed to None.

## 2025-09-04 ENCOUNTER — TELEPHONE (OUTPATIENT)
Dept: TRANSPLANT | Facility: CLINIC | Age: 40
End: 2025-09-04
Payer: COMMERCIAL

## (undated) DEVICE — DRSG GAUZE 4X4" 3033

## (undated) DEVICE — ESU PENCIL SMOKE EVAC W/ROCKER SWITCH 0703-047-000

## (undated) DEVICE — LOOP RETRACT-O-TAPE 16GA X 18 QUEST 1012

## (undated) DEVICE — SU PROLENE 6-0 C-1DA 18" M8718

## (undated) DEVICE — SU SILK 2-0 TIE 18' A185H

## (undated) DEVICE — SLING ARM FOAM M 0814-0793

## (undated) DEVICE — CUSTOM PACK PERIPH VASCULAR SCV5BPVHEA

## (undated) DEVICE — LIGACLIP SMALL AESCULAP J1180-1

## (undated) DEVICE — SU VICRYL+ 3-0 27IN SH UND VCP416H

## (undated) DEVICE — Device

## (undated) DEVICE — SUTURE SILK 0 TIES 30IN SA86G

## (undated) DEVICE — GLOVE BIOGEL PI SZ 8.0 40880

## (undated) DEVICE — PLATE GROUNDING ADULT W/CORD 9165L

## (undated) DEVICE — DRAPE SHEET REV FOLD 3/4 9349

## (undated) DEVICE — SOL NACL 0.9% IRRIG 1000ML BOTTLE 2F7124

## (undated) DEVICE — TOWEL SURG 16INW X 26INL WHITE STRL XRAY DETECTABLE X8314W

## (undated) DEVICE — CATH FOGARTY EMBOLECTOMY 3FR 40CM LATEX 120403F

## (undated) DEVICE — SU PROLENE 6-0 BV-1 DA 24" 8805H

## (undated) DEVICE — ADH SKIN CLOSURE PREMIERPRO EXOFIN 1.0ML 3470

## (undated) DEVICE — PREP CHLORAPREP 26ML TINTED HI-LITE ORANGE 930815

## (undated) DEVICE — BNDG KLING 4" 2236

## (undated) DEVICE — VESSEL LOOP WHITE MAXI 30-721

## (undated) DEVICE — PACK MAJOR BASIN 673

## (undated) DEVICE — DECANTER BAG 2002S

## (undated) DEVICE — SOL WATER IRRIG 1000ML BOTTLE 2F7114

## (undated) DEVICE — LIGACLIP MEDIUM AESCULAP B2180-1

## (undated) DEVICE — SU SILK 3-0 TIE 18" SA64H

## (undated) DEVICE — SOL NACL 0.9% INJ 250ML BAG 2B1322Q

## (undated) DEVICE — GOWN SURGICAL SMARTGOWN 2XL 89075

## (undated) DEVICE — COVER ULTRASOUND PROBE STRL 9001C0197

## (undated) DEVICE — NEEDLE HYPO 22X1-1/2 SAFETY 305900

## (undated) DEVICE — DRSG KERLIX 4 1/2"X4YDS ROLL 6715

## (undated) DEVICE — SYR 20ML LL W/O NDL 302830

## (undated) DEVICE — SUTURE MONOCRYL+ 4-0 PS-2 27IN MCP426H

## (undated) RX ORDER — HEPARIN SODIUM 1000 [USP'U]/ML
INJECTION, SOLUTION INTRAVENOUS; SUBCUTANEOUS
Status: DISPENSED
Start: 2022-04-13

## (undated) RX ORDER — FENTANYL CITRATE 50 UG/ML
INJECTION, SOLUTION INTRAMUSCULAR; INTRAVENOUS
Status: DISPENSED
Start: 2022-08-11

## (undated) RX ORDER — FENTANYL CITRATE 50 UG/ML
INJECTION, SOLUTION INTRAMUSCULAR; INTRAVENOUS
Status: DISPENSED
Start: 2022-04-29

## (undated) RX ORDER — FENTANYL CITRATE 50 UG/ML
INJECTION, SOLUTION INTRAMUSCULAR; INTRAVENOUS
Status: DISPENSED
Start: 2025-03-11

## (undated) RX ORDER — LIDOCAINE HYDROCHLORIDE 10 MG/ML
INJECTION, SOLUTION INFILTRATION; PERINEURAL
Status: DISPENSED
Start: 2025-03-11

## (undated) RX ORDER — HEPARIN SODIUM 1000 [USP'U]/ML
INJECTION, SOLUTION INTRAVENOUS; SUBCUTANEOUS
Status: DISPENSED
Start: 2022-04-29

## (undated) RX ORDER — BUPIVACAINE HYDROCHLORIDE 2.5 MG/ML
INJECTION, SOLUTION INFILTRATION; PERINEURAL
Status: DISPENSED
Start: 2022-04-13

## (undated) RX ORDER — LIDOCAINE HYDROCHLORIDE 10 MG/ML
INJECTION, SOLUTION EPIDURAL; INFILTRATION; INTRACAUDAL; PERINEURAL
Status: DISPENSED
Start: 2022-04-29

## (undated) RX ORDER — ONDANSETRON 2 MG/ML
INJECTION INTRAMUSCULAR; INTRAVENOUS
Status: DISPENSED
Start: 2022-08-11

## (undated) RX ORDER — LIDOCAINE HYDROCHLORIDE 10 MG/ML
INJECTION, SOLUTION INFILTRATION; PERINEURAL
Status: DISPENSED
Start: 2022-08-11

## (undated) RX ORDER — LIDOCAINE HYDROCHLORIDE 10 MG/ML
INJECTION, SOLUTION INFILTRATION; PERINEURAL
Status: DISPENSED
Start: 2022-04-26

## (undated) RX ORDER — ONDANSETRON 2 MG/ML
INJECTION INTRAMUSCULAR; INTRAVENOUS
Status: DISPENSED
Start: 2025-03-11

## (undated) RX ORDER — HEPARIN SODIUM 1000 [USP'U]/ML
INJECTION, SOLUTION INTRAVENOUS; SUBCUTANEOUS
Status: DISPENSED
Start: 2022-04-26

## (undated) RX ORDER — PROTAMINE SULFATE 10 MG/ML
INJECTION, SOLUTION INTRAVENOUS
Status: DISPENSED
Start: 2022-04-13